# Patient Record
Sex: FEMALE | Race: WHITE | NOT HISPANIC OR LATINO | URBAN - METROPOLITAN AREA
[De-identification: names, ages, dates, MRNs, and addresses within clinical notes are randomized per-mention and may not be internally consistent; named-entity substitution may affect disease eponyms.]

---

## 2017-01-05 ENCOUNTER — OUTPATIENT (OUTPATIENT)
Dept: OUTPATIENT SERVICES | Facility: HOSPITAL | Age: 67
LOS: 1 days | End: 2017-01-05
Payer: COMMERCIAL

## 2017-01-05 DIAGNOSIS — Z01.818 ENCOUNTER FOR OTHER PREPROCEDURAL EXAMINATION: ICD-10-CM

## 2017-01-05 DIAGNOSIS — Z47.1 AFTERCARE FOLLOWING JOINT REPLACEMENT SURGERY: ICD-10-CM

## 2017-01-05 DIAGNOSIS — T81.89XA OTHER COMPLICATIONS OF PROCEDURES, NOT ELSEWHERE CLASSIFIED, INITIAL ENCOUNTER: ICD-10-CM

## 2017-01-05 DIAGNOSIS — Z01.812 ENCOUNTER FOR PREPROCEDURAL LABORATORY EXAMINATION: ICD-10-CM

## 2017-01-05 DIAGNOSIS — Z96.651 PRESENCE OF RIGHT ARTIFICIAL KNEE JOINT: ICD-10-CM

## 2017-01-05 PROCEDURE — 80048 BASIC METABOLIC PNL TOTAL CA: CPT

## 2017-01-05 PROCEDURE — 36415 COLL VENOUS BLD VENIPUNCTURE: CPT

## 2017-01-05 PROCEDURE — 85027 COMPLETE CBC AUTOMATED: CPT

## 2017-01-05 PROCEDURE — G0463: CPT

## 2017-01-06 LAB
ANION GAP SERPL CALC-SCNC: 14 MMOL/L — SIGNIFICANT CHANGE UP (ref 5–17)
BUN SERPL-MCNC: 17 MG/DL — SIGNIFICANT CHANGE UP (ref 7–23)
CALCIUM SERPL-MCNC: 10 MG/DL — SIGNIFICANT CHANGE UP (ref 8.4–10.5)
CHLORIDE SERPL-SCNC: 101 MMOL/L — SIGNIFICANT CHANGE UP (ref 96–108)
CO2 SERPL-SCNC: 26 MMOL/L — SIGNIFICANT CHANGE UP (ref 22–31)
CREAT SERPL-MCNC: 0.67 MG/DL — SIGNIFICANT CHANGE UP (ref 0.5–1.3)
GLUCOSE SERPL-MCNC: 94 MG/DL — SIGNIFICANT CHANGE UP (ref 70–99)
POTASSIUM SERPL-MCNC: 4.4 MMOL/L — SIGNIFICANT CHANGE UP (ref 3.5–5.3)
POTASSIUM SERPL-SCNC: 4.4 MMOL/L — SIGNIFICANT CHANGE UP (ref 3.5–5.3)
SODIUM SERPL-SCNC: 141 MMOL/L — SIGNIFICANT CHANGE UP (ref 135–145)

## 2017-03-02 ENCOUNTER — OUTPATIENT (OUTPATIENT)
Dept: OUTPATIENT SERVICES | Facility: HOSPITAL | Age: 67
LOS: 1 days | End: 2017-03-02
Payer: COMMERCIAL

## 2017-03-02 VITALS
WEIGHT: 220.02 LBS | DIASTOLIC BLOOD PRESSURE: 80 MMHG | HEIGHT: 63 IN | TEMPERATURE: 99 F | OXYGEN SATURATION: 97 % | RESPIRATION RATE: 16 BRPM | HEART RATE: 60 BPM | SYSTOLIC BLOOD PRESSURE: 156 MMHG

## 2017-03-02 DIAGNOSIS — G47.33 OBSTRUCTIVE SLEEP APNEA (ADULT) (PEDIATRIC): ICD-10-CM

## 2017-03-02 DIAGNOSIS — T81.89XA OTHER COMPLICATIONS OF PROCEDURES, NOT ELSEWHERE CLASSIFIED, INITIAL ENCOUNTER: ICD-10-CM

## 2017-03-02 DIAGNOSIS — Z96.651 PRESENCE OF RIGHT ARTIFICIAL KNEE JOINT: Chronic | ICD-10-CM

## 2017-03-02 DIAGNOSIS — Z01.818 ENCOUNTER FOR OTHER PREPROCEDURAL EXAMINATION: ICD-10-CM

## 2017-03-02 DIAGNOSIS — Z47.1 AFTERCARE FOLLOWING JOINT REPLACEMENT SURGERY: ICD-10-CM

## 2017-03-02 DIAGNOSIS — I10 ESSENTIAL (PRIMARY) HYPERTENSION: ICD-10-CM

## 2017-03-02 DIAGNOSIS — Z96.651 PRESENCE OF RIGHT ARTIFICIAL KNEE JOINT: ICD-10-CM

## 2017-03-02 LAB
ANION GAP SERPL CALC-SCNC: 14 MMOL/L — SIGNIFICANT CHANGE UP (ref 5–17)
BUN SERPL-MCNC: 14 MG/DL — SIGNIFICANT CHANGE UP (ref 7–23)
CALCIUM SERPL-MCNC: 10 MG/DL — SIGNIFICANT CHANGE UP (ref 8.4–10.5)
CHLORIDE SERPL-SCNC: 100 MMOL/L — SIGNIFICANT CHANGE UP (ref 96–108)
CO2 SERPL-SCNC: 24 MMOL/L — SIGNIFICANT CHANGE UP (ref 22–31)
CREAT SERPL-MCNC: 0.75 MG/DL — SIGNIFICANT CHANGE UP (ref 0.5–1.3)
GLUCOSE SERPL-MCNC: 108 MG/DL — HIGH (ref 70–99)
HCT VFR BLD CALC: 42.3 % — SIGNIFICANT CHANGE UP (ref 34.5–45)
HGB BLD-MCNC: 13.6 G/DL — SIGNIFICANT CHANGE UP (ref 11.5–15.5)
MCHC RBC-ENTMCNC: 27.4 PG — SIGNIFICANT CHANGE UP (ref 27–34)
MCHC RBC-ENTMCNC: 32.2 GM/DL — SIGNIFICANT CHANGE UP (ref 32–36)
MCV RBC AUTO: 85.1 FL — SIGNIFICANT CHANGE UP (ref 80–100)
PLATELET # BLD AUTO: 352 K/UL — SIGNIFICANT CHANGE UP (ref 150–400)
POTASSIUM SERPL-MCNC: 4.8 MMOL/L — SIGNIFICANT CHANGE UP (ref 3.5–5.3)
POTASSIUM SERPL-SCNC: 4.8 MMOL/L — SIGNIFICANT CHANGE UP (ref 3.5–5.3)
RBC # BLD: 4.97 M/UL — SIGNIFICANT CHANGE UP (ref 3.8–5.2)
RBC # FLD: 14.7 % — HIGH (ref 10.3–14.5)
SODIUM SERPL-SCNC: 138 MMOL/L — SIGNIFICANT CHANGE UP (ref 135–145)
WBC # BLD: 8.43 K/UL — SIGNIFICANT CHANGE UP (ref 3.8–10.5)
WBC # FLD AUTO: 8.43 K/UL — SIGNIFICANT CHANGE UP (ref 3.8–10.5)

## 2017-03-02 PROCEDURE — G0463: CPT

## 2017-03-02 PROCEDURE — 80048 BASIC METABOLIC PNL TOTAL CA: CPT

## 2017-03-02 PROCEDURE — 85027 COMPLETE CBC AUTOMATED: CPT

## 2017-03-02 RX ORDER — ACETAMINOPHEN 500 MG
975 TABLET ORAL ONCE
Qty: 0 | Refills: 0 | Status: COMPLETED | OUTPATIENT
Start: 2017-03-16 | End: 2017-03-16

## 2017-03-02 RX ORDER — SODIUM CHLORIDE 9 MG/ML
3 INJECTION INTRAMUSCULAR; INTRAVENOUS; SUBCUTANEOUS EVERY 8 HOURS
Qty: 0 | Refills: 0 | Status: DISCONTINUED | OUTPATIENT
Start: 2017-03-16 | End: 2017-03-31

## 2017-03-02 RX ORDER — LIDOCAINE HCL 20 MG/ML
0.2 VIAL (ML) INJECTION ONCE
Qty: 0 | Refills: 0 | Status: DISCONTINUED | OUTPATIENT
Start: 2017-03-16 | End: 2017-03-31

## 2017-03-02 RX ORDER — CELECOXIB 200 MG/1
200 CAPSULE ORAL ONCE
Qty: 0 | Refills: 0 | Status: COMPLETED | OUTPATIENT
Start: 2017-03-16 | End: 2017-03-16

## 2017-03-02 NOTE — H&P PST ADULT - HISTORY OF PRESENT ILLNESS
68 yo female. h/o obesity, HTN, fibromyalgia, osteoarthritis. S/P  right TKR 10/25/16. Presents right knee excision of suture Granuloma. 66 yo female. h/o Morbid obesity, HTN, fibromyalgia, osteoarthritis. S/P  right TKR 10/25/16. Pt states there is a suture still in her knee. Presents right knee excision of suture Granuloma.

## 2017-03-02 NOTE — H&P PST ADULT - PSH
Bladder Prolapse, Acquired  s/p repair  Status Post Breast Lumpectomy  right benign  Tubal Ligation Bladder Prolapse, Acquired  s/p repair  Status Post Breast Lumpectomy  right benign  Status post total right knee replacement  10/25/17  Tubal Ligation

## 2017-03-02 NOTE — H&P PST ADULT - PROBLEM SELECTOR PLAN 1
Right knee excision of suture granuloma  Check labs Right knee excision of suture granuloma  Check labs  Advised Dr Calderon OR booking person Melissa shelton is a Jehovah Witness and does not take blood products.

## 2017-03-02 NOTE — H&P PST ADULT - PMH
HTN (Hypertension)    Muscle Cramps at Night    HARIS (obstructive sleep apnea)  noncompliant with CPAP  Patient is Hinduism  refuse blood products  Pericarditis  around age 30's  Primary osteoarthritis of right knee HTN (Hypertension)    Morbid obesity due to excess calories    Muscle Cramps at Night    No blood products  Pt is Jehovah Witness  HARIS (obstructive sleep apnea)  noncompliant with CPAP  Patient is Druze  refuse blood products  Pericarditis  around age 30's  Primary osteoarthritis of right knee

## 2017-03-15 ENCOUNTER — RESULT REVIEW (OUTPATIENT)
Age: 67
End: 2017-03-15

## 2017-03-16 ENCOUNTER — OUTPATIENT (OUTPATIENT)
Dept: OUTPATIENT SERVICES | Facility: HOSPITAL | Age: 67
LOS: 1 days | End: 2017-03-16
Payer: COMMERCIAL

## 2017-03-16 ENCOUNTER — TRANSCRIPTION ENCOUNTER (OUTPATIENT)
Age: 67
End: 2017-03-16

## 2017-03-16 VITALS
HEART RATE: 70 BPM | DIASTOLIC BLOOD PRESSURE: 67 MMHG | OXYGEN SATURATION: 100 % | SYSTOLIC BLOOD PRESSURE: 144 MMHG | RESPIRATION RATE: 15 BRPM

## 2017-03-16 VITALS
RESPIRATION RATE: 18 BRPM | DIASTOLIC BLOOD PRESSURE: 83 MMHG | HEART RATE: 60 BPM | WEIGHT: 220.02 LBS | TEMPERATURE: 99 F | SYSTOLIC BLOOD PRESSURE: 156 MMHG | HEIGHT: 63 IN | OXYGEN SATURATION: 97 %

## 2017-03-16 DIAGNOSIS — T81.89XA OTHER COMPLICATIONS OF PROCEDURES, NOT ELSEWHERE CLASSIFIED, INITIAL ENCOUNTER: ICD-10-CM

## 2017-03-16 DIAGNOSIS — Z01.818 ENCOUNTER FOR OTHER PREPROCEDURAL EXAMINATION: ICD-10-CM

## 2017-03-16 DIAGNOSIS — Z96.651 PRESENCE OF RIGHT ARTIFICIAL KNEE JOINT: ICD-10-CM

## 2017-03-16 DIAGNOSIS — Z96.651 PRESENCE OF RIGHT ARTIFICIAL KNEE JOINT: Chronic | ICD-10-CM

## 2017-03-16 DIAGNOSIS — Z47.1 AFTERCARE FOLLOWING JOINT REPLACEMENT SURGERY: ICD-10-CM

## 2017-03-16 PROCEDURE — 88304 TISSUE EXAM BY PATHOLOGIST: CPT | Mod: 26

## 2017-03-16 PROCEDURE — 88304 TISSUE EXAM BY PATHOLOGIST: CPT

## 2017-03-16 PROCEDURE — 10120 INC&RMVL FB SUBQ TISS SMPL: CPT

## 2017-03-16 RX ORDER — SODIUM CHLORIDE 9 MG/ML
1000 INJECTION, SOLUTION INTRAVENOUS
Qty: 0 | Refills: 0 | Status: DISCONTINUED | OUTPATIENT
Start: 2017-03-16 | End: 2017-03-31

## 2017-03-16 RX ORDER — ONDANSETRON 8 MG/1
4 TABLET, FILM COATED ORAL ONCE
Qty: 0 | Refills: 0 | Status: DISCONTINUED | OUTPATIENT
Start: 2017-03-16 | End: 2017-03-31

## 2017-03-16 RX ORDER — MORPHINE SULFATE 50 MG/1
2 CAPSULE, EXTENDED RELEASE ORAL
Qty: 0 | Refills: 0 | Status: DISCONTINUED | OUTPATIENT
Start: 2017-03-16 | End: 2017-03-16

## 2017-03-16 RX ORDER — CELECOXIB 200 MG/1
200 CAPSULE ORAL ONCE
Qty: 0 | Refills: 0 | Status: DISCONTINUED | OUTPATIENT
Start: 2017-03-16 | End: 2017-03-31

## 2017-03-16 RX ORDER — OXYCODONE HYDROCHLORIDE 5 MG/1
5 TABLET ORAL ONCE
Qty: 0 | Refills: 0 | Status: DISCONTINUED | OUTPATIENT
Start: 2017-03-16 | End: 2017-03-16

## 2017-03-16 RX ORDER — ACETAMINOPHEN 500 MG
650 TABLET ORAL EVERY 6 HOURS
Qty: 0 | Refills: 0 | Status: DISCONTINUED | OUTPATIENT
Start: 2017-03-16 | End: 2017-03-31

## 2017-03-16 RX ORDER — VALSARTAN 80 MG/1
40 TABLET ORAL DAILY
Qty: 0 | Refills: 0 | Status: DISCONTINUED | OUTPATIENT
Start: 2017-03-16 | End: 2017-03-31

## 2017-03-16 RX ADMIN — CELECOXIB 200 MILLIGRAM(S): 200 CAPSULE ORAL at 12:58

## 2017-03-16 RX ADMIN — Medication 975 MILLIGRAM(S): at 12:58

## 2017-03-16 NOTE — ASU DISCHARGE PLAN (ADULT/PEDIATRIC). - NOTIFY
Fever greater than 101/Pain not relieved by Medications/Numbness, color, or temperature change to extremity/Swelling that continues

## 2017-03-16 NOTE — ASU DISCHARGE PLAN (ADULT/PEDIATRIC). - MEDICATION SUMMARY - MEDICATIONS TO TAKE
I will START or STAY ON the medications listed below when I get home from the hospital:    Prevacid  -- 1 tab(s) by mouth once a day, As Needed  -- Indication: For Acid reflux    Allegra  -- 1 tab(s) by mouth once a day, As Needed  -- Indication: For seasonal allergies    acetaminophen 325 mg oral tablet  -- 2 tab(s) by mouth every 6 hours, As needed, For Temp over 38.3 C (100.94 F)  -- Indication: For fever    oxyCODONE 5 mg oral tablet  -- 1 tab(s) by mouth every 3 hours, As needed, Moderate Pain (4 - 6)  -- Indication: For Pain    aspirin 325 mg oral delayed release tablet  -- 1 tab(s) by mouth 2 times a day x 6 WEEKS Total (blood thinner) then  RESUME Ecotrin 325mg DAILY  -- Indication: For dvt ppx    Diovan 40 mg oral tablet  -- 1 tab(s) by mouth once a day  -- Indication: For hypertension    Lyrica 100 mg oral capsule  -- 1 cap(s) by mouth once a day (at bedtime)  -- Indication: For neuropathy    gabapentin 300 mg oral capsule  -- 1 cap(s) by mouth once a day (at bedtime), As Needed  -- Indication: For neuropathy    simvastatin 20 mg oral tablet  -- 1 tab(s) by mouth 2 times a day  -- Indication: For hyperlipidemia    hydroCHLOROthiazide  --  by mouth once a day  -- Indication: For hypertension    docusate sodium 100 mg oral capsule  -- 1 cap(s) by mouth 3 times a day  -- Indication: For BM    senna oral tablet  -- 2 tab(s) by mouth once a day (at bedtime), As needed, Constipation  -- Indication: For BM    oxybutynin 5 mg/24 hours oral tablet, extended release  -- 2 tab(s) by mouth once a day (at bedtime)  -- Indication: For urinary spasms    Multiple Vitamins oral tablet  -- 1 tab(s) by mouth once a day  -- Indication: For supplement

## 2017-03-16 NOTE — ASU PATIENT PROFILE, ADULT - PMH
HTN (Hypertension)    Morbid obesity due to excess calories    Muscle Cramps at Night    No blood products  Pt is Jehovah Witness  HARIS (obstructive sleep apnea)  noncompliant with CPAP  Patient is Taoist  refuse blood products  Pericarditis  around age 30's  Primary osteoarthritis of right knee

## 2017-03-20 NOTE — ASU PREOP CHECKLIST - VERIFY SURGICAL SITE/SIDE WITH PATIENT
SUBJECTIVE:  Ms. Mark Diaz is seen back in followup for her rheumatoid arthritis.  She is actually doing well and came in today specifically to get an injection in her left shoulder, given how well the injection in her right shoulder and right trochanteric bursa worked.  She denies any injury or trauma just that the shoulder is keeping her awake at night.  Otherwise, she thinks her arthritis is doing well.      PHYSICAL EXAMINATION:     VITALS:  Blood pressure 162/99, pulse 73.     MUSCULOSKELETAL:  There is a pain in the subacromial bursal region, pain with external rotation of left shoulder.  No palpable synovitis or effusion.      IMPRESSION:   1.  Rheumatoid arthritis.   2.  Left subacromial bursitis.        RECOMMENDATIONS:     1.  Continue current medical regimen to inject the left shoulder today.   2.  Follow up in 6 months with labs every 3 months including today.      PROCEDURE NOTE:  After informed verbal consent was obtained, under sterile technique, choose ethyl chloride for anesthetic, injected 40 mg of Kenalog with 2 cc of lidocaine in the left shoulder without complications.  The patient tolerated the procedure well.         RUBEN CAMARGO MD             D: 2017 12:43   T: 2017 15:10   MT: [id number]#[id number]      Name:     MARK DIAZ   MRN:      [id number]        Account:      [id number]   :      1955           Visit Date:   2017      Document: [id number]    
done/right knee

## 2017-03-21 LAB — SURGICAL PATHOLOGY STUDY: SIGNIFICANT CHANGE UP

## 2018-02-02 ENCOUNTER — APPOINTMENT (OUTPATIENT)
Dept: BARIATRICS | Facility: CLINIC | Age: 68
End: 2018-02-02
Payer: MEDICARE

## 2018-02-02 VITALS
TEMPERATURE: 98.1 F | WEIGHT: 227 LBS | SYSTOLIC BLOOD PRESSURE: 166 MMHG | DIASTOLIC BLOOD PRESSURE: 78 MMHG | HEART RATE: 58 BPM | BODY MASS INDEX: 41.77 KG/M2 | HEIGHT: 62 IN

## 2018-02-02 DIAGNOSIS — Z86.59 PERSONAL HISTORY OF OTHER MENTAL AND BEHAVIORAL DISORDERS: ICD-10-CM

## 2018-02-02 DIAGNOSIS — Z86.39 PERSONAL HISTORY OF OTHER ENDOCRINE, NUTRITIONAL AND METABOLIC DISEASE: ICD-10-CM

## 2018-02-02 DIAGNOSIS — Z78.9 OTHER SPECIFIED HEALTH STATUS: ICD-10-CM

## 2018-02-02 DIAGNOSIS — Z80.3 FAMILY HISTORY OF MALIGNANT NEOPLASM OF BREAST: ICD-10-CM

## 2018-02-02 DIAGNOSIS — Z87.39 PERSONAL HISTORY OF OTHER DISEASES OF THE MUSCULOSKELETAL SYSTEM AND CONNECTIVE TISSUE: ICD-10-CM

## 2018-02-02 DIAGNOSIS — Z86.69 PERSONAL HISTORY OF OTHER DISEASES OF THE NERVOUS SYSTEM AND SENSE ORGANS: ICD-10-CM

## 2018-02-02 PROCEDURE — 99203 OFFICE O/P NEW LOW 30 MIN: CPT

## 2018-02-02 RX ORDER — ASPIRIN ENTERIC COATED TABLETS 81 MG 81 MG/1
81 TABLET, DELAYED RELEASE ORAL
Qty: 90 | Refills: 0 | Status: ACTIVE | COMMUNITY
Start: 2018-01-10

## 2018-02-13 ENCOUNTER — APPOINTMENT (OUTPATIENT)
Dept: PULMONOLOGY | Facility: CLINIC | Age: 68
End: 2018-02-13

## 2018-02-13 ENCOUNTER — LABORATORY RESULT (OUTPATIENT)
Age: 68
End: 2018-02-13

## 2018-02-13 ENCOUNTER — APPOINTMENT (OUTPATIENT)
Dept: PULMONOLOGY | Facility: CLINIC | Age: 68
End: 2018-02-13
Payer: MEDICARE

## 2018-02-13 VITALS
OXYGEN SATURATION: 93 % | DIASTOLIC BLOOD PRESSURE: 75 MMHG | WEIGHT: 232 LBS | TEMPERATURE: 98.3 F | BODY MASS INDEX: 42.69 KG/M2 | SYSTOLIC BLOOD PRESSURE: 139 MMHG | HEART RATE: 70 BPM | HEIGHT: 62 IN

## 2018-02-13 PROCEDURE — 94726 PLETHYSMOGRAPHY LUNG VOLUMES: CPT

## 2018-02-13 PROCEDURE — 94010 BREATHING CAPACITY TEST: CPT

## 2018-02-13 PROCEDURE — 94729 DIFFUSING CAPACITY: CPT

## 2018-02-13 PROCEDURE — 99203 OFFICE O/P NEW LOW 30 MIN: CPT | Mod: 25

## 2018-02-20 ENCOUNTER — APPOINTMENT (OUTPATIENT)
Dept: PULMONOLOGY | Facility: CLINIC | Age: 68
End: 2018-02-20
Payer: MEDICARE

## 2018-02-20 VITALS
SYSTOLIC BLOOD PRESSURE: 131 MMHG | HEART RATE: 59 BPM | OXYGEN SATURATION: 93 % | BODY MASS INDEX: 43.24 KG/M2 | DIASTOLIC BLOOD PRESSURE: 73 MMHG | WEIGHT: 232 LBS | HEIGHT: 61.5 IN

## 2018-02-20 PROCEDURE — 99203 OFFICE O/P NEW LOW 30 MIN: CPT

## 2018-02-20 RX ORDER — FEXOFENADINE HCL 60 MG
TABLET ORAL
Refills: 0 | Status: ACTIVE | COMMUNITY

## 2018-02-20 RX ORDER — HYDROCHLOROTHIAZIDE 12.5 MG/1
12.5 CAPSULE ORAL
Qty: 90 | Refills: 0 | Status: DISCONTINUED | COMMUNITY
Start: 2017-10-05 | End: 2018-02-20

## 2018-02-20 RX ORDER — SIMVASTATIN 20 MG/1
20 TABLET, FILM COATED ORAL
Qty: 90 | Refills: 0 | Status: DISCONTINUED | COMMUNITY
Start: 2017-10-11 | End: 2018-02-20

## 2018-02-20 RX ORDER — ROSUVASTATIN CALCIUM 10 MG/1
10 TABLET, FILM COATED ORAL
Qty: 90 | Refills: 0 | Status: ACTIVE | COMMUNITY
Start: 2017-12-02

## 2018-03-13 ENCOUNTER — APPOINTMENT (OUTPATIENT)
Dept: GASTROENTEROLOGY | Facility: CLINIC | Age: 68
End: 2018-03-13
Payer: MEDICARE

## 2018-03-13 VITALS
DIASTOLIC BLOOD PRESSURE: 86 MMHG | HEIGHT: 61.5 IN | SYSTOLIC BLOOD PRESSURE: 126 MMHG | TEMPERATURE: 97.5 F | WEIGHT: 224 LBS | HEART RATE: 64 BPM | BODY MASS INDEX: 41.75 KG/M2 | RESPIRATION RATE: 18 BRPM

## 2018-03-13 PROCEDURE — 99204 OFFICE O/P NEW MOD 45 MIN: CPT

## 2018-03-19 ENCOUNTER — APPOINTMENT (OUTPATIENT)
Dept: CARDIOLOGY | Facility: CLINIC | Age: 68
End: 2018-03-19
Payer: MEDICARE

## 2018-03-19 VITALS
SYSTOLIC BLOOD PRESSURE: 172 MMHG | BODY MASS INDEX: 41.75 KG/M2 | RESPIRATION RATE: 16 BRPM | HEART RATE: 77 BPM | HEIGHT: 61.5 IN | OXYGEN SATURATION: 96 % | WEIGHT: 224 LBS | DIASTOLIC BLOOD PRESSURE: 90 MMHG

## 2018-03-19 DIAGNOSIS — R06.02 SHORTNESS OF BREATH: ICD-10-CM

## 2018-03-19 PROCEDURE — 99204 OFFICE O/P NEW MOD 45 MIN: CPT

## 2018-03-19 PROCEDURE — 93000 ELECTROCARDIOGRAM COMPLETE: CPT

## 2018-03-19 RX ORDER — METFORMIN HYDROCHLORIDE 500 MG/1
500 TABLET, COATED ORAL
Qty: 180 | Refills: 3 | Status: DISCONTINUED | COMMUNITY
Start: 2018-02-20 | End: 2018-03-19

## 2018-04-09 ENCOUNTER — OUTPATIENT (OUTPATIENT)
Dept: OUTPATIENT SERVICES | Facility: HOSPITAL | Age: 68
LOS: 1 days | End: 2018-04-09
Payer: COMMERCIAL

## 2018-04-09 ENCOUNTER — APPOINTMENT (OUTPATIENT)
Dept: CARDIOLOGY | Facility: CLINIC | Age: 68
End: 2018-04-09

## 2018-04-09 DIAGNOSIS — Z00.8 ENCOUNTER FOR OTHER GENERAL EXAMINATION: ICD-10-CM

## 2018-04-09 DIAGNOSIS — Z96.651 PRESENCE OF RIGHT ARTIFICIAL KNEE JOINT: Chronic | ICD-10-CM

## 2018-04-09 PROCEDURE — 93306 TTE W/DOPPLER COMPLETE: CPT | Mod: 26

## 2018-04-09 PROCEDURE — 93306 TTE W/DOPPLER COMPLETE: CPT

## 2018-04-18 ENCOUNTER — OTHER (OUTPATIENT)
Age: 68
End: 2018-04-18

## 2018-04-23 ENCOUNTER — RESULT REVIEW (OUTPATIENT)
Age: 68
End: 2018-04-23

## 2018-04-23 ENCOUNTER — OUTPATIENT (OUTPATIENT)
Dept: OUTPATIENT SERVICES | Facility: HOSPITAL | Age: 68
LOS: 1 days | End: 2018-04-23
Payer: COMMERCIAL

## 2018-04-23 DIAGNOSIS — Z01.818 ENCOUNTER FOR OTHER PREPROCEDURAL EXAMINATION: ICD-10-CM

## 2018-04-23 DIAGNOSIS — Z96.651 PRESENCE OF RIGHT ARTIFICIAL KNEE JOINT: Chronic | ICD-10-CM

## 2018-04-23 PROCEDURE — 88312 SPECIAL STAINS GROUP 1: CPT | Mod: 26

## 2018-04-23 PROCEDURE — 88312 SPECIAL STAINS GROUP 1: CPT

## 2018-04-23 PROCEDURE — 88305 TISSUE EXAM BY PATHOLOGIST: CPT

## 2018-04-23 PROCEDURE — 43239 EGD BIOPSY SINGLE/MULTIPLE: CPT

## 2018-04-23 PROCEDURE — 88305 TISSUE EXAM BY PATHOLOGIST: CPT | Mod: 26

## 2018-04-24 ENCOUNTER — APPOINTMENT (OUTPATIENT)
Dept: CARDIOLOGY | Facility: CLINIC | Age: 68
End: 2018-04-24

## 2018-04-24 ENCOUNTER — OUTPATIENT (OUTPATIENT)
Dept: OUTPATIENT SERVICES | Facility: HOSPITAL | Age: 68
LOS: 1 days | End: 2018-04-24
Payer: COMMERCIAL

## 2018-04-24 DIAGNOSIS — Z00.8 ENCOUNTER FOR OTHER GENERAL EXAMINATION: ICD-10-CM

## 2018-04-24 DIAGNOSIS — Z96.651 PRESENCE OF RIGHT ARTIFICIAL KNEE JOINT: Chronic | ICD-10-CM

## 2018-04-24 DIAGNOSIS — R06.02 SHORTNESS OF BREATH: ICD-10-CM

## 2018-04-24 PROCEDURE — 93018 CV STRESS TEST I&R ONLY: CPT

## 2018-04-24 PROCEDURE — 78452 HT MUSCLE IMAGE SPECT MULT: CPT | Mod: 26

## 2018-04-24 PROCEDURE — 93017 CV STRESS TEST TRACING ONLY: CPT

## 2018-04-24 PROCEDURE — 78452 HT MUSCLE IMAGE SPECT MULT: CPT

## 2018-04-24 PROCEDURE — A9500: CPT

## 2018-04-24 PROCEDURE — 93016 CV STRESS TEST SUPVJ ONLY: CPT

## 2018-04-26 ENCOUNTER — APPOINTMENT (OUTPATIENT)
Dept: SLEEP CENTER | Facility: CLINIC | Age: 68
End: 2018-04-26
Payer: MEDICARE

## 2018-04-26 ENCOUNTER — OUTPATIENT (OUTPATIENT)
Dept: OUTPATIENT SERVICES | Facility: HOSPITAL | Age: 68
LOS: 1 days | End: 2018-04-26
Payer: COMMERCIAL

## 2018-04-26 DIAGNOSIS — Z96.651 PRESENCE OF RIGHT ARTIFICIAL KNEE JOINT: Chronic | ICD-10-CM

## 2018-04-26 PROCEDURE — 95811 POLYSOM 6/>YRS CPAP 4/> PARM: CPT | Mod: 26

## 2018-04-26 PROCEDURE — 95811 POLYSOM 6/>YRS CPAP 4/> PARM: CPT

## 2018-04-27 DIAGNOSIS — G47.33 OBSTRUCTIVE SLEEP APNEA (ADULT) (PEDIATRIC): ICD-10-CM

## 2018-05-03 ENCOUNTER — APPOINTMENT (OUTPATIENT)
Dept: PULMONOLOGY | Facility: CLINIC | Age: 68
End: 2018-05-03
Payer: MEDICARE

## 2018-05-03 VITALS
OXYGEN SATURATION: 98 % | SYSTOLIC BLOOD PRESSURE: 133 MMHG | HEART RATE: 76 BPM | HEIGHT: 61.5 IN | WEIGHT: 229 LBS | BODY MASS INDEX: 42.68 KG/M2 | DIASTOLIC BLOOD PRESSURE: 57 MMHG

## 2018-05-03 PROCEDURE — 99213 OFFICE O/P EST LOW 20 MIN: CPT

## 2018-05-23 ENCOUNTER — OTHER (OUTPATIENT)
Age: 68
End: 2018-05-23

## 2018-05-29 ENCOUNTER — APPOINTMENT (OUTPATIENT)
Dept: PULMONOLOGY | Facility: CLINIC | Age: 68
End: 2018-05-29
Payer: MEDICARE

## 2018-05-29 PROCEDURE — 95806 SLEEP STUDY UNATT&RESP EFFT: CPT

## 2018-06-18 ENCOUNTER — APPOINTMENT (OUTPATIENT)
Dept: CARDIOLOGY | Facility: CLINIC | Age: 68
End: 2018-06-18

## 2018-07-02 ENCOUNTER — APPOINTMENT (OUTPATIENT)
Dept: CARDIOLOGY | Facility: CLINIC | Age: 68
End: 2018-07-02

## 2018-07-02 ENCOUNTER — OTHER (OUTPATIENT)
Age: 68
End: 2018-07-02

## 2018-08-07 ENCOUNTER — APPOINTMENT (OUTPATIENT)
Dept: PULMONOLOGY | Facility: CLINIC | Age: 68
End: 2018-08-07
Payer: MEDICARE

## 2018-08-07 VITALS
SYSTOLIC BLOOD PRESSURE: 145 MMHG | WEIGHT: 225 LBS | HEART RATE: 64 BPM | HEIGHT: 61.5 IN | BODY MASS INDEX: 41.94 KG/M2 | OXYGEN SATURATION: 95 % | DIASTOLIC BLOOD PRESSURE: 74 MMHG

## 2018-08-07 PROBLEM — E66.01 MORBID (SEVERE) OBESITY DUE TO EXCESS CALORIES: Chronic | Status: ACTIVE | Noted: 2017-03-02

## 2018-08-07 PROCEDURE — 99213 OFFICE O/P EST LOW 20 MIN: CPT

## 2018-08-17 ENCOUNTER — NON-APPOINTMENT (OUTPATIENT)
Age: 68
End: 2018-08-17

## 2018-08-17 ENCOUNTER — APPOINTMENT (OUTPATIENT)
Dept: CARDIOLOGY | Facility: CLINIC | Age: 68
End: 2018-08-17
Payer: MEDICARE

## 2018-08-17 VITALS
HEART RATE: 59 BPM | WEIGHT: 225 LBS | SYSTOLIC BLOOD PRESSURE: 152 MMHG | HEIGHT: 65 IN | DIASTOLIC BLOOD PRESSURE: 78 MMHG | BODY MASS INDEX: 37.49 KG/M2 | OXYGEN SATURATION: 94 %

## 2018-08-17 DIAGNOSIS — Z01.818 ENCOUNTER FOR OTHER PREPROCEDURAL EXAMINATION: ICD-10-CM

## 2018-08-17 PROCEDURE — 93000 ELECTROCARDIOGRAM COMPLETE: CPT

## 2018-08-17 PROCEDURE — 99214 OFFICE O/P EST MOD 30 MIN: CPT

## 2018-08-17 RX ORDER — VALSARTAN AND HYDROCHLOROTHIAZIDE 160; 25 MG/1; MG/1
160-25 TABLET, FILM COATED ORAL
Qty: 90 | Refills: 0 | Status: DISCONTINUED | COMMUNITY
Start: 2018-01-10 | End: 2018-08-17

## 2018-09-04 ENCOUNTER — APPOINTMENT (OUTPATIENT)
Dept: PULMONOLOGY | Facility: CLINIC | Age: 68
End: 2018-09-04
Payer: MEDICARE

## 2018-09-04 VITALS
SYSTOLIC BLOOD PRESSURE: 111 MMHG | HEART RATE: 71 BPM | BODY MASS INDEX: 37.15 KG/M2 | DIASTOLIC BLOOD PRESSURE: 62 MMHG | WEIGHT: 223 LBS | HEIGHT: 65 IN | OXYGEN SATURATION: 97 %

## 2018-09-04 PROCEDURE — 99213 OFFICE O/P EST LOW 20 MIN: CPT

## 2018-11-15 ENCOUNTER — APPOINTMENT (OUTPATIENT)
Dept: PULMONOLOGY | Facility: CLINIC | Age: 68
End: 2018-11-15
Payer: MEDICARE

## 2018-11-15 VITALS
OXYGEN SATURATION: 98 % | DIASTOLIC BLOOD PRESSURE: 72 MMHG | WEIGHT: 224 LBS | HEIGHT: 65 IN | BODY MASS INDEX: 37.32 KG/M2 | SYSTOLIC BLOOD PRESSURE: 116 MMHG | HEART RATE: 66 BPM

## 2018-11-15 PROCEDURE — 99213 OFFICE O/P EST LOW 20 MIN: CPT

## 2018-11-21 ENCOUNTER — APPOINTMENT (OUTPATIENT)
Dept: PULMONOLOGY | Facility: CLINIC | Age: 68
End: 2018-11-21

## 2018-12-18 ENCOUNTER — APPOINTMENT (OUTPATIENT)
Dept: CARDIOLOGY | Facility: CLINIC | Age: 68
End: 2018-12-18

## 2018-12-18 ENCOUNTER — APPOINTMENT (OUTPATIENT)
Dept: PULMONOLOGY | Facility: CLINIC | Age: 68
End: 2018-12-18
Payer: MEDICARE

## 2018-12-18 VITALS
DIASTOLIC BLOOD PRESSURE: 75 MMHG | OXYGEN SATURATION: 97 % | HEART RATE: 62 BPM | WEIGHT: 224 LBS | HEIGHT: 65 IN | SYSTOLIC BLOOD PRESSURE: 145 MMHG | BODY MASS INDEX: 37.32 KG/M2

## 2018-12-18 PROCEDURE — 99213 OFFICE O/P EST LOW 20 MIN: CPT

## 2019-01-08 DIAGNOSIS — Z01.818 ENCOUNTER FOR OTHER PREPROCEDURAL EXAMINATION: ICD-10-CM

## 2019-01-28 ENCOUNTER — APPOINTMENT (OUTPATIENT)
Dept: SURGERY | Facility: CLINIC | Age: 69
End: 2019-01-28
Payer: MEDICARE

## 2019-01-28 VITALS
HEART RATE: 71 BPM | WEIGHT: 223 LBS | OXYGEN SATURATION: 98 % | HEIGHT: 63 IN | DIASTOLIC BLOOD PRESSURE: 80 MMHG | RESPIRATION RATE: 15 BRPM | BODY MASS INDEX: 39.51 KG/M2 | SYSTOLIC BLOOD PRESSURE: 139 MMHG

## 2019-01-28 DIAGNOSIS — M25.569 PAIN IN UNSPECIFIED KNEE: ICD-10-CM

## 2019-01-28 DIAGNOSIS — E66.01 MORBID (SEVERE) OBESITY DUE TO EXCESS CALORIES: ICD-10-CM

## 2019-01-28 DIAGNOSIS — J45.909 UNSPECIFIED ASTHMA, UNCOMPLICATED: ICD-10-CM

## 2019-01-28 DIAGNOSIS — I83.93 ASYMPTOMATIC VARICOSE VEINS OF BILATERAL LOWER EXTREMITIES: ICD-10-CM

## 2019-01-28 DIAGNOSIS — K21.9 GASTRO-ESOPHAGEAL REFLUX DISEASE W/OUT ESOPHAGITIS: ICD-10-CM

## 2019-01-28 PROCEDURE — 99205 OFFICE O/P NEW HI 60 MIN: CPT

## 2019-01-28 RX ORDER — ALBUTEROL SULFATE 90 UG/1
AEROSOL, METERED RESPIRATORY (INHALATION)
Refills: 0 | Status: DISCONTINUED | COMMUNITY
End: 2019-01-28

## 2019-01-28 RX ORDER — BUPROPION HYDROCHLORIDE 300 MG/1
300 TABLET, EXTENDED RELEASE ORAL
Qty: 90 | Refills: 0 | Status: DISCONTINUED | COMMUNITY
Start: 2018-01-17 | End: 2019-01-28

## 2019-01-28 RX ORDER — PREGABALIN 100 MG/1
100 CAPSULE ORAL
Qty: 60 | Refills: 0 | Status: DISCONTINUED | COMMUNITY
Start: 2017-08-20 | End: 2019-01-28

## 2019-01-28 NOTE — REVIEW OF SYSTEMS
[Lower Ext Edema] : lower extremity edema [Reflux/Heartburn] : reflux/heartburn [Joint Pain] : joint pain [Negative] : Allergic/Immunologic [Patient Intake Form Reviewed] : Patient intake form was reviewed

## 2019-01-28 NOTE — CONSULT LETTER
[Dear  ___] : Dear  [unfilled], [Consult Letter:] : I had the pleasure of evaluating your patient, [unfilled]. [Please see my note below.] : Please see my note below. [Consult Closing:] : Thank you very much for allowing me to participate in the care of this patient.  If you have any questions, please do not hesitate to contact me. [Sincerely,] : Sincerely, [FreeTextEntry3] : August Kiser MD, FACS, FASMBS\par Associate , Department of Surgery\par Chief, Division of General Surgery\par Director of Metabolic and Bariatric Surgery, and Robotic Minimally Invasive Surgery

## 2019-01-28 NOTE — REASON FOR VISIT
[Initial Consult] : an initial consult for [Morbid Obesity (BMI<40)] : morbid obesity (bmi<40) [Spouse] : spouse

## 2019-01-28 NOTE — PHYSICAL EXAM
[Obese, well nourished, in no acute distress] : obese, well nourished, in no acute distress [Normal] : affect appropriate [de-identified] : Normoactive bowel sounds, no hepatosplenomegaly, no masses, non-tender.

## 2019-01-28 NOTE — PLAN
[FreeTextEntry1] : Weight loss surgery.  The risks, benefits and alternatives, including laparoscopic gastric bypass, laparoscopic vertical sleeve gastrectomy and laparoscopic gastric banding, were discussed at length and all of her questions were answered.  The patient appears to understand and wishes to proceed.\par \par The patient was given the following instructions:\par \par 1.	She needs a complete medical evaluation including echocardiogram, stress test, pulmonary function test and evaluation for sleep apnea.\par 2.	She needs an upper endoscopy.\par \par 3.	She needs dietary and psychological evaluations.\par \par 4.	She must attend a preoperative support group meeting.\par \par 5.	She must undergo a right upper quadrant ultrasound.\par \par The patient clearly understood that surgery would only be scheduled if there are no medical or psychiatric contraindications and a second office visit is required.  \par \par

## 2019-01-28 NOTE — ASSESSMENT
[FreeTextEntry1] : The patient is a morbidly obese woman, (BMI= 39.5), with significant weight related comorbidity including: Obstructive sleep apnea, shortness of breath with exertion, weightbearing joint pain, low back pain, hypertension and hypercholesterolemia; unable to lose weight and improve her co-morbid conditions with medical management including diet, exercise and weight loss medication.

## 2019-01-28 NOTE — HISTORY OF PRESENT ILLNESS
[de-identified] : Sumi is a 67 y/o female here for a weight loss consultation visit.  [de-identified] : The patient is a 68 year-old morbidly obese woman, 5 feet 3 inches 223 pounds (BMI= 39.5).  The patient presents with her  requesting weight loss surgery.  She was originally seen by me 6 years prior but lost her insurance. She most recently was seen by Dr. Mayorga and was ready to schedule surgery however Dr. Mayorga we located. She has been more than 100 lb. overweight for the past 10 years and is currently 20 pounds less than her greatest weight.  \par \par The patient has tried numerous weight loss programs including [place] [employer], Slim fast, [place], and self-directed and physician supervised diets. Patient has taken weight loss medication (Fen-Phen, redux) but had to stop due to known side effects. Patient has lost up to 50 pounds on more than one occasion.\par \par \par The patient denies diabetes, but complains of shortness of breath with exertion, weight bearing joint pain, and low back pain.  She denies kidney, urinary tract disease, headaches, dizziness, seizure or neurological disorder other than "hand" issue.  She denies untreated thyroid, adrenal, pituitary disease, depression or psychiatric disorder.  The patient denies gallstones, heartburn, ulcer or liver disease.  She has high blood pressure, and high cholesterol, but denies heart attack or stroke.  She denies anemia, bleeding disorder, thrombosis, clotting disorder or easy bruisability.  She denies peripheral edema and complains of snoring and daytime somnolence. She denies a history of irregular menses, hirsutism, acne, infertility or stress urinary incontinence.

## 2019-03-19 ENCOUNTER — APPOINTMENT (OUTPATIENT)
Dept: PULMONOLOGY | Facility: CLINIC | Age: 69
End: 2019-03-19
Payer: MEDICARE

## 2019-03-19 VITALS
HEIGHT: 63 IN | OXYGEN SATURATION: 98 % | SYSTOLIC BLOOD PRESSURE: 162 MMHG | HEART RATE: 58 BPM | DIASTOLIC BLOOD PRESSURE: 73 MMHG | BODY MASS INDEX: 39.34 KG/M2 | WEIGHT: 222 LBS

## 2019-03-19 DIAGNOSIS — Z01.811 ENCOUNTER FOR PREPROCEDURAL RESPIRATORY EXAMINATION: ICD-10-CM

## 2019-03-19 PROCEDURE — 99214 OFFICE O/P EST MOD 30 MIN: CPT | Mod: 25

## 2019-03-19 PROCEDURE — 94729 DIFFUSING CAPACITY: CPT | Mod: 26

## 2019-03-19 PROCEDURE — 94010 BREATHING CAPACITY TEST: CPT | Mod: 26

## 2019-03-19 PROCEDURE — 94727 GAS DIL/WSHOT DETER LNG VOL: CPT | Mod: 26

## 2019-03-19 NOTE — PROCEDURE
[FreeTextEntry1] : The pulmonary function testing done and was normal spirometry and lung volumes. The DLCO is moderately reduced.\par The CPAP download data reveals that the patient has used CPAP 97% of the time. Her AHI is 2.3.

## 2019-03-19 NOTE — PHYSICAL EXAM
[General Appearance - Well Developed] : well developed [Well Groomed] : well groomed [Normal Appearance] : normal appearance [General Appearance - Well Nourished] : well nourished [No Deformities] : no deformities [General Appearance - In No Acute Distress] : no acute distress [Normal Conjunctiva] : the conjunctiva exhibited no abnormalities [Normal Oropharynx] : normal oropharynx [Eyelids - No Xanthelasma] : the eyelids demonstrated no xanthelasmas [IV] : IV [Neck Appearance] : the appearance of the neck was normal [Neck Cervical Mass (___cm)] : no neck mass was observed [Jugular Venous Distention Increased] : there was no jugular-venous distention [Thyroid Diffuse Enlargement] : the thyroid was not enlarged [Thyroid Nodule] : there were no palpable thyroid nodules [Heart Sounds] : normal S1 and S2 [Heart Rate And Rhythm] : heart rate and rhythm were normal [Murmurs] : no murmurs present [Exaggerated Use Of Accessory Muscles For Inspiration] : no accessory muscle use [Respiration, Rhythm And Depth] : normal respiratory rhythm and effort [Auscultation Breath Sounds / Voice Sounds] : lungs were clear to auscultation bilaterally [Abdomen Soft] : soft [Abdomen Tenderness] : non-tender [Abdomen Mass (___ Cm)] : no abdominal mass palpated [Gait - Sufficient For Exercise Testing] : the gait was sufficient for exercise testing [Abnormal Walk] : normal gait [Nail Clubbing] : no clubbing of the fingernails [Cyanosis, Localized] : no localized cyanosis [Skin Color & Pigmentation] : normal skin color and pigmentation [Petechial Hemorrhages (___cm)] : no petechial hemorrhages [No Venous Stasis] : no venous stasis [] : no rash [Skin Lesions] : no skin lesions [No Skin Ulcers] : no skin ulcer [Deep Tendon Reflexes (DTR)] : deep tendon reflexes were 2+ and symmetric [No Xanthoma] : no  xanthoma was observed [No Focal Deficits] : no focal deficits [Sensation] : the sensory exam was normal to light touch and pinprick [Affect] : the affect was normal [Impaired Insight] : insight and judgment were intact [Oriented To Time, Place, And Person] : oriented to person, place, and time

## 2019-03-19 NOTE — DISCUSSION/SUMMARY
[FreeTextEntry1] : Based on history, examination and pulmonary function testing the patient is cleared for the proposed bariatric surgery from pulmonary point of view.\par The patient has obstructive sleep apnea and is on CPAP of 12 cm of water. It is recommended that the patient use the CPAP regularly including during the  perioperative period.

## 2019-03-19 NOTE — HISTORY OF PRESENT ILLNESS
[FreeTextEntry1] : The patient is a 69-year-old lady with history of sleep apnea here for evaluation prior to bariatric surgery.\par The patient is on CPAP of 12 cm of water.\par She uses CPAP regularly. She tolerates it well.\par The patient has no dyspnea on mild to moderate exertion. She has no cough. She is stable at present from the pulmonary point of view.

## 2019-03-19 NOTE — REASON FOR VISIT
[Preoperative Evaluation] : an preoperative evaluation [FreeTextEntry1] : Prior to bariatric surgery

## 2019-09-17 ENCOUNTER — APPOINTMENT (OUTPATIENT)
Dept: PULMONOLOGY | Facility: CLINIC | Age: 69
End: 2019-09-17

## 2020-10-12 ENCOUNTER — APPOINTMENT (OUTPATIENT)
Dept: NEUROLOGY | Facility: CLINIC | Age: 70
End: 2020-10-12

## 2020-11-04 ENCOUNTER — APPOINTMENT (OUTPATIENT)
Dept: NEUROLOGY | Facility: CLINIC | Age: 70
End: 2020-11-04
Payer: MEDICARE

## 2020-11-04 VITALS
BODY MASS INDEX: 38.45 KG/M2 | SYSTOLIC BLOOD PRESSURE: 131 MMHG | HEIGHT: 63 IN | WEIGHT: 217 LBS | TEMPERATURE: 98 F | OXYGEN SATURATION: 98 % | HEART RATE: 84 BPM | DIASTOLIC BLOOD PRESSURE: 34 MMHG

## 2020-11-04 DIAGNOSIS — R26.89 OTHER ABNORMALITIES OF GAIT AND MOBILITY: ICD-10-CM

## 2020-11-04 PROCEDURE — 99205 OFFICE O/P NEW HI 60 MIN: CPT

## 2020-11-04 PROCEDURE — 99072 ADDL SUPL MATRL&STAF TM PHE: CPT

## 2020-11-04 RX ORDER — PAROXETINE HYDROCHLORIDE 20 MG/1
20 TABLET, FILM COATED ORAL
Qty: 90 | Refills: 0 | Status: DISCONTINUED | COMMUNITY
Start: 2017-10-11 | End: 2020-11-04

## 2020-11-04 RX ORDER — HYDROCHLOROTHIAZIDE 12.5 MG/1
12.5 TABLET ORAL
Refills: 0 | Status: DISCONTINUED | COMMUNITY
End: 2020-11-04

## 2020-11-04 RX ORDER — FLUTICASONE PROPIONATE 50 UG/1
50 SPRAY, METERED NASAL
Qty: 16 | Refills: 0 | Status: DISCONTINUED | COMMUNITY
Start: 2018-01-10 | End: 2020-11-04

## 2020-11-04 NOTE — PHYSICAL EXAM
[General Appearance - Alert] : alert [General Appearance - In No Acute Distress] : in no acute distress [Person] : oriented to person [Place] : oriented to place [Time] : oriented to time [Registration Intact] : recent registration memory intact [Concentration Intact] : normal concentrating ability [Visual Intact] : visual attention was ~T not ~L decreased [Naming Objects] : no difficulty naming common objects [Repeating Phrases] : no difficulty repeating a phrase [Fluency] : fluency intact [Comprehension] : comprehension intact [Vocabulary] : adequate range of vocabulary [Cranial Nerves Optic (II)] : visual acuity intact bilaterally,  visual fields full to confrontation, pupils equal round and reactive to light [Cranial Nerves Oculomotor (III)] : extraocular motion intact [Cranial Nerves Trigeminal (V)] : facial sensation intact symmetrically [Cranial Nerves Facial (VII)] : face symmetrical [Cranial Nerves Vestibulocochlear (VIII)] : hearing was intact bilaterally [Cranial Nerves Glossopharyngeal (IX)] : tongue and palate midline [Cranial Nerves Accessory (XI - Cranial And Spinal)] : head turning and shoulder shrug symmetric [Cranial Nerves Hypoglossal (XII)] : there was no tongue deviation with protrusion [Motor Tone] : muscle tone was normal in all four extremities [Motor Strength] : muscle strength was normal in all four extremities [Involuntary Movements] : no involuntary movements were seen [Sensation Tactile Decrease] : light touch was intact [Romberg's Sign] : a positive Romberg's sign was present [Abnormal Walk] : normal gait [Balance] : balance was intact [Limited Balance] : the patient's balance was impaired [1+] : Ankle jerk left 1+ [Full Pulse] : the pedal pulses are present [Coordination - Dysmetria Impaired Finger-to-Nose Bilateral] : not present [Coordination - Dysmetria Impaired Heel-to-Shin Bilateral] : not present [Plantar Reflex Right Only] : normal on the right [Plantar Reflex Left Only] : normal on the left [FreeTextEntry5] : fundi not visualized

## 2020-11-04 NOTE — CONSULT LETTER
[Dear  ___] : Dear  [unfilled], [Consult Letter:] : I had the pleasure of evaluating your patient, [unfilled]. [Please see my note below.] : Please see my note below. [Consult Closing:] : Thank you very much for allowing me to participate in the care of this patient.  If you have any questions, please do not hesitate to contact me. [Sincerely,] : Sincerely, [FreeTextEntry3] : Gila Hodge MD, MPH\par

## 2020-11-04 NOTE — HISTORY OF PRESENT ILLNESS
[FreeTextEntry1] : The patient is here for balance problems since spring 2020.  She feels unsteady and feels like she falls to the left side.  She denies vertigo, double or loss of vision, slurred speech or difficulty with swallowing.  She has blurry vision and feels dizzy at times.  She has had a few falls.  Additionally, the patient feels numbness and tingling in bl hands and legs, unclear for how long, without any weakness.\par \par She also has chronic headaches, described as throbbing pain with at times nausea and vomiting, no notable photo and phonophobia.  The headaches are not frequent at this time per the patient.

## 2020-11-04 NOTE — ASSESSMENT
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

## 2020-11-04 NOTE — REVIEW OF SYSTEMS
[As Noted in HPI] : as noted in HPI [Eyesight Problems] : eyesight problems [Joint Pain] : joint pain [Fever] : no fever [Anxiety] : no anxiety [Loss Of Hearing] : no hearing loss [Chest Pain] : no chest pain [Shortness Of Breath] : no shortness of breath [Vomiting] : no vomiting [Pelvic Pain] : no pelvic pain [Itching] : no itching [Muscle Weakness] : no muscle weakness [Easy Bleeding] : no tendency for easy bleeding

## 2020-11-05 LAB
ESTIMATED AVERAGE GLUCOSE: 151 MG/DL
FOLATE SERPL-MCNC: 17.6 NG/ML
HBA1C MFR BLD HPLC: 6.9 %
T3 SERPL-MCNC: 130 NG/DL
T4 SERPL-MCNC: 8.4 UG/DL
TSH SERPL-ACNC: 1.61 UIU/ML
VIT B12 SERPL-MCNC: 878 PG/ML

## 2020-11-06 LAB
ALBUMIN MFR SERPL ELPH: 52.1 %
ALBUMIN SERPL-MCNC: 3.7 G/DL
ALBUMIN/GLOB SERPL: 1.1 RATIO
ALPHA1 GLOB MFR SERPL ELPH: 4.4 %
ALPHA1 GLOB SERPL ELPH-MCNC: 0.3 G/DL
ALPHA2 GLOB MFR SERPL ELPH: 11.5 %
ALPHA2 GLOB SERPL ELPH-MCNC: 0.8 G/DL
B-GLOBULIN MFR SERPL ELPH: 15.7 %
B-GLOBULIN SERPL ELPH-MCNC: 1.1 G/DL
GAMMA GLOB FLD ELPH-MCNC: 1.2 G/DL
GAMMA GLOB MFR SERPL ELPH: 16.3 %
INTERPRETATION SERPL IEP-IMP: NORMAL
M PROTEIN SPEC IFE-MCNC: NORMAL
PROT SERPL-MCNC: 7.1 G/DL
PROT SERPL-MCNC: 7.1 G/DL

## 2020-11-11 LAB — METHYLMALONATE SERPL-SCNC: 175 NMOL/L

## 2020-12-10 ENCOUNTER — APPOINTMENT (OUTPATIENT)
Dept: NEUROLOGY | Facility: CLINIC | Age: 70
End: 2020-12-10

## 2021-05-10 ENCOUNTER — APPOINTMENT (OUTPATIENT)
Dept: NEUROLOGY | Facility: CLINIC | Age: 71
End: 2021-05-10
Payer: MEDICARE

## 2021-05-10 VITALS
HEIGHT: 63 IN | DIASTOLIC BLOOD PRESSURE: 81 MMHG | HEART RATE: 80 BPM | WEIGHT: 206 LBS | SYSTOLIC BLOOD PRESSURE: 146 MMHG | BODY MASS INDEX: 36.5 KG/M2

## 2021-05-10 DIAGNOSIS — M54.9 DORSALGIA, UNSPECIFIED: ICD-10-CM

## 2021-05-10 PROCEDURE — 99214 OFFICE O/P EST MOD 30 MIN: CPT

## 2021-05-10 NOTE — DATA REVIEWED
[de-identified] : As per HPI [No Feeding Issues] : no feeding issues at this time [de-identified] : We had the pleasure of evaluating Meseret in the Division of Hematology/Oncology at [facility] for evaluation of heavy menses and secondary anemia.  Meseret is a 15 year old girl with past medical history of heart murmur (evaluated by Dr. Crump of cardiology in 2014 and deemed functional murmur), autism and aggressive behavior who presents to us as a hospital follow up after being admitted on 10/17/19 for anemia.  She has recently had dysfunctional uterine bleeding x 3-4 months where mother would report daily bleeding of up to 6 pad changes a day.  Additionally, she had begun having decreased PO intake and daily vomiting which mom states is new behavior and unlike Meseret.  She does not attribute this vomiting to mealtimes and does not think it is caused by food.  She was first taken to the ER at [facility] in August 2019 for heavy menses and external pelvic ultrasound showed no ovarian cysts or torsion. She began seeing a gynecologist who prescribed her Vymynza (0.04/0.035 ethyl estradiol). Mom states the first cycle of OCP went well and Meseret stopped bleeding; however, prison through second pack she began bleeding again and this bleeding has continued now for 1 month.  Per mother, the day of admission (10/16/19), Meseret went upstairs at home and parents hear a thud.  She was found by parents sitting on the floor, reportedly "out of it" per mother.  Fall unwitnessed but mother does not think she lost consciousness.  She was taken to [facility] ED where hemoglobin was noted to be 7.1.  Iron studies showed serum iron of 16 and ferritin of 7.60 on 10/16/19.  She was given 1 liter NS bolus and venofer but remained tachycardic and hematology was consulted--1 unit of PRBC given. She was discharged home with a hemoglobin of 8.2.  She presents to us today for subsequent referral of her dysfunctional uterine bleeding and secondary iron deficiency. \par \par Meseret was born at 34 weeks in [facility] via c section due to maternal hypotension.  Mother states Meseret had no bleeding and no complications after delivery.  She denies history of easy bruising in Meseret or bleeding.  Denies epistaxis and denies bleeding to gums with teeth brushing. She has had no surgeries or dental extractions.  \par \par Family history notable for mom's sister and father having history of nosebleeds but no other bleeding history in family.  Mom denies heavy menses. Meseret has a 19 year old sister with no nosebleed history and no heavy menses that mother is aware of. \par \par Meseret is verbal, ambulatory, and able to answer questions.  Her medications include:\par Abilify 10 mg (once daily in a.m.)\par Prozac 40 mg (once daily in a.m.)\par Risperidone 1 mg (once daily p.m.)\par Clonidine 0.1 mg (2 tablets daily for total 0.2 mg in p.m.). \par Began OCP Vymynza (0.04/0.035 ethyl estradiol) in August 2019.  [de-identified] : Meseret presents for follow up of her iron deficient anemia.  She has recently completed her venofer x 4 doses, last given on 11/7/19.  She reports feeling "much better" with "more energy".  She has seen Dr. Luz of gynecology and has now been on OCP nortrel for 20 days as per mother.  Mom states that within the first week of OCP's she stopped bleeding.  \par \par Her hemoglobin today is 10.7

## 2021-05-10 NOTE — DISCUSSION/SUMMARY
[FreeTextEntry1] : 71-year-old woman who is here for initial visit with me for back pain with radiation down both legs in the distribution of bilateral S1/s2.  Will obtain MRI of the lumbar spine.  We will also start prednisone taper and have her follow-up with Dr. Hodge and neurosurgeon as per her previous visit with Dr. Hodge.\par \par I spent the time noted on the day of this patient encounter preparing for, providing and documenting the above E/M service and counseling and educate patient on differential, workup, disease course, and treatment/management. Education was provided to the patient during this encounter. All questions and concerns were answered and addressed in detail. The patient verbalized understanding and agreed to plan. Patient was advised to continue to monitor for neurologic symptoms and to notify my office or go to the nearest emergency room if there are any changes. Any orders/referrals and communications were provided as well. \par Side effects of the above medications were discussed in detail including but not limited to applicable black box warning and teratogenicity as appropriate. \par Patient was advised to bring previous records to my office. \par \par \par

## 2021-05-10 NOTE — HISTORY OF PRESENT ILLNESS
[FreeTextEntry1] : 71-year-old woman who had seen Dr. Hodge in the past for balance issues.  Patient had neuropathy blood work which was unremarkable.  Patient was supposed to go for EMG testing however patient had to cancel due to Covid exposure.  Patient did not get a chance to see neurosurgery for surgical evaluation of a Chiari I malformation on MRI of the brain.\par \par Today patient came with a different symptom of back pain for the past couple of months.  There is no story of trauma or heavy lifting that may have triggered this.  Patient states the pain starts in the back and goes down bilateral buttocks to the back of the knees.  She has no changes in her urinary issues.  No fecal incontinence.  No perineal anesthesia.

## 2021-05-10 NOTE — PHYSICAL EXAM
[General Appearance - Alert] : alert [Oriented To Time, Place, And Person] : oriented to person, place, and time [Person] : oriented to person [Place] : oriented to place [Time] : oriented to time [Short Term Intact] : short term memory intact [Fluency] : fluency intact [Current Events] : adequate knowledge of current events [Cranial Nerves Optic (II)] : visual acuity intact bilaterally,  visual fields full to confrontation, pupils equal round and reactive to light [Cranial Nerves Oculomotor (III)] : extraocular motion intact [Cranial Nerves Vestibulocochlear (VIII)] : hearing was intact bilaterally [Cranial Nerves Accessory (XI - Cranial And Spinal)] : head turning and shoulder shrug symmetric [Motor Tone] : muscle tone was normal in all four extremities [Motor Strength] : muscle strength was normal in all four extremities [Sensation Tactile Decrease] : light touch was intact [Sensation Pain / Temperature Decrease] : pain and temperature was intact [Coordination - Dysmetria Impaired Finger-to-Nose Bilateral] : not present [1+] : Patella left 1+ [0] : Ankle jerk left 0 [FreeTextEntry6] : Pain limited exam of legs [FreeTextEntry8] : Antalgic gait

## 2021-06-01 ENCOUNTER — APPOINTMENT (OUTPATIENT)
Dept: NEUROSURGERY | Facility: CLINIC | Age: 71
End: 2021-06-01

## 2021-06-01 ENCOUNTER — NON-APPOINTMENT (OUTPATIENT)
Age: 71
End: 2021-06-01

## 2021-06-15 ENCOUNTER — APPOINTMENT (OUTPATIENT)
Dept: NEUROSURGERY | Facility: CLINIC | Age: 71
End: 2021-06-15
Payer: MEDICARE

## 2021-06-15 VITALS — WEIGHT: 205 LBS | OXYGEN SATURATION: 98 % | HEART RATE: 94 BPM | BODY MASS INDEX: 37.73 KG/M2 | HEIGHT: 62 IN

## 2021-06-15 DIAGNOSIS — G93.5 COMPRESSION OF BRAIN: ICD-10-CM

## 2021-06-15 PROCEDURE — 99204 OFFICE O/P NEW MOD 45 MIN: CPT

## 2021-06-17 PROBLEM — G93.5 CHIARI I MALFORMATION: Status: ACTIVE | Noted: 2020-11-04

## 2021-06-17 NOTE — RESULTS/DATA
[FreeTextEntry1] : 5/18/21 (R) MRI Lumbar wo \par Impression:  \par moderate to severe central canal stenosis at L3/4, L4/5, multilevel disc bulges and posterior element hypertrophy.\par L4/5 broad based disc impingement on the traversing Right L5 nerve root.\par Mild lumbar dextroscoliosis.  Slight anterolisthesis at L4/5.\par Mild foraminal narrowing L3/4, L4/5, L5/S1.\par

## 2021-06-17 NOTE — REVIEW OF SYSTEMS
[Tingling] : tingling [Limping] : limping [As Noted in HPI] : as noted in HPI [Limb Pain] : limb pain [Negative] : Endocrine [Abdominal Pain] : no abdominal pain [Vomiting] : no vomiting [Diarrhea] : no diarrhea [Dysuria] : no dysuria [Incontinence] : no incontinence [Skin Lesions] : no skin lesions [Easy Bleeding] : no tendency for easy bleeding [Easy Bruising] : no tendency for easy bruising [de-identified] : Denies a/c, a/p

## 2021-06-17 NOTE — HISTORY OF PRESENT ILLNESS
[FreeTextEntry1] : review imaging / Chiari 1 malformation [de-identified] : 72 yo female who arrives for an initial consult for Chiari I malformation and chronic back pain.\par Pain is in low back, and Right lateral thigh, tingling bottom of Right foot.\par Pain rated 8/10 severe at times\par \par [facility] imaging: \par 6/8/20 [facility] MRI Brain - report in [employer] \par 11/12/2020: [facility] MRI Cervical  ord by Dr. Hodge \par 5/18/21 MRI Lumbar wo - ord by Dr. Gusman

## 2021-06-17 NOTE — PHYSICAL EXAM
[General Appearance - Alert] : alert [General Appearance - In No Acute Distress] : in no acute distress [Oriented To Time, Place, And Person] : oriented to person, place, and time [Impaired Insight] : insight and judgment were intact [5] : S1 ankle flexors 5/5 [Sensation Tactile Decrease] : light touch was intact [Antalgic] : antalgic [Able to toe walk] : the patient was able to toe walk [Able to heel walk] : the patient was able to heel walk [Intact] : all sensory within normal limits bilaterally [Sclera] : the sclera and conjunctiva were normal [PERRL With Normal Accommodation] : pupils were equal in size, round, reactive to light, with normal accommodation [Hearing Threshold Finger Rub Not [place]] : hearing was normal [Neck Appearance] : the appearance of the neck was normal [] : no respiratory distress [Edema] : there was no peripheral edema [Involuntary Movements] : no involuntary movements were seen [Motor Tone] : muscle strength and tone were normal [Skin Color & Pigmentation] : normal skin color and pigmentation [Romberg's Sign] : Romberg's sign was negtive [Limited Balance] : balance was intact [Tremor] : no tremor present [FreeTextEntry1] : antalgic gait, (low back pain)

## 2021-06-17 NOTE — ASSESSMENT
[FreeTextEntry1] : Chronic low back pain with no surgical pathology observed on R 5/8/21 xrays and MRI reviewed today.  Also, 11/12/2020 MRI cervical reviewed with no surgical follow required for Chiari 1 malformation.  She should continue pain management, start PT, and follow up with neurology.\par \par 1)  Pain management - Dr. Pablito Denis\par 2)  PT - Back / LE\par f/u PRN

## 2021-06-21 ENCOUNTER — APPOINTMENT (OUTPATIENT)
Dept: ORTHOPEDIC SURGERY | Facility: CLINIC | Age: 71
End: 2021-06-21
Payer: MEDICARE

## 2021-06-21 VITALS
BODY MASS INDEX: 37.73 KG/M2 | DIASTOLIC BLOOD PRESSURE: 91 MMHG | HEIGHT: 62 IN | SYSTOLIC BLOOD PRESSURE: 144 MMHG | HEART RATE: 134 BPM | TEMPERATURE: 98 F | WEIGHT: 205 LBS

## 2021-06-21 DIAGNOSIS — M48.062 SPINAL STENOSIS, LUMBAR REGION WITH NEUROGENIC CLAUDICATION: ICD-10-CM

## 2021-06-21 PROCEDURE — 99204 OFFICE O/P NEW MOD 45 MIN: CPT

## 2021-06-21 PROCEDURE — 72110 X-RAY EXAM L-2 SPINE 4/>VWS: CPT

## 2021-06-21 RX ORDER — MULTIVITAMIN
TABLET ORAL
Refills: 0 | Status: ACTIVE | COMMUNITY

## 2021-07-14 ENCOUNTER — APPOINTMENT (OUTPATIENT)
Dept: UROLOGY | Facility: CLINIC | Age: 71
End: 2021-07-14
Payer: MEDICARE

## 2021-07-14 VITALS
TEMPERATURE: 97.2 F | SYSTOLIC BLOOD PRESSURE: 130 MMHG | HEART RATE: 100 BPM | BODY MASS INDEX: 37.73 KG/M2 | DIASTOLIC BLOOD PRESSURE: 80 MMHG | HEIGHT: 62 IN | WEIGHT: 205 LBS

## 2021-07-14 PROCEDURE — 99204 OFFICE O/P NEW MOD 45 MIN: CPT

## 2021-07-14 NOTE — HISTORY OF PRESENT ILLNESS
[FreeTextEntry1] : cc incontinence \par 72 yo fem referred for eval incontinence \par present for years \par leaks with urgency and without control \par takes oxybutin  10 mg little relief \par h/o sling many years ago \par uti 1/21 \par no dysuria \par 3 cups coffee in am \par +constipation

## 2021-07-14 NOTE — ASSESSMENT
[FreeTextEntry1] : check ua cx\par will increase oxybutinin to 15 \par start miralax\par reduce caffene \par f/u cysto

## 2021-07-16 ENCOUNTER — APPOINTMENT (OUTPATIENT)
Dept: NEUROLOGY | Facility: CLINIC | Age: 71
End: 2021-07-16

## 2021-07-16 LAB
APPEARANCE: CLEAR
BACTERIA UR CULT: NORMAL
BACTERIA: NEGATIVE
BILIRUBIN URINE: NEGATIVE
BLOOD URINE: NEGATIVE
COLOR: NORMAL
GLUCOSE QUALITATIVE U: NEGATIVE
HYALINE CASTS: 0 /LPF
KETONES URINE: NEGATIVE
LEUKOCYTE ESTERASE URINE: NEGATIVE
MICROSCOPIC-UA: NORMAL
NITRITE URINE: NEGATIVE
PH URINE: 6.5
PROTEIN URINE: NORMAL
RED BLOOD CELLS URINE: 2 /HPF
SPECIFIC GRAVITY URINE: 1.02
SQUAMOUS EPITHELIAL CELLS: 3 /HPF
UROBILINOGEN URINE: NORMAL
WHITE BLOOD CELLS URINE: 2 /HPF

## 2021-07-23 ENCOUNTER — APPOINTMENT (OUTPATIENT)
Dept: UROLOGY | Facility: CLINIC | Age: 71
End: 2021-07-23

## 2021-08-04 ENCOUNTER — APPOINTMENT (OUTPATIENT)
Dept: UROLOGY | Facility: CLINIC | Age: 71
End: 2021-08-04
Payer: MEDICARE

## 2021-08-04 DIAGNOSIS — R32 UNSPECIFIED URINARY INCONTINENCE: ICD-10-CM

## 2021-08-04 PROCEDURE — 52000 CYSTOURETHROSCOPY: CPT

## 2021-08-12 PROBLEM — M54.16 CHRONIC LUMBAR RADICULOPATHY: Status: ACTIVE | Noted: 2021-06-15

## 2021-08-17 ENCOUNTER — APPOINTMENT (OUTPATIENT)
Dept: NEUROSURGERY | Facility: CLINIC | Age: 71
End: 2021-08-17

## 2021-08-17 DIAGNOSIS — M54.16 RADICULOPATHY, LUMBAR REGION: ICD-10-CM

## 2021-08-17 PROBLEM — R32 URINARY INCONTINENCE IN FEMALE: Status: ACTIVE | Noted: 2021-07-14

## 2021-10-18 NOTE — HISTORY OF PRESENT ILLNESS
[FreeTextEntry1] : The patient has been suffering from chronic low back pain. Pain radiates to her right lateral thigh with tingling in the bottom of her right foot. Pain is severe and rate at 8/10. MRI lumbar spine done and noted with central canal stenosis at L3-4 and L4-5 with impingement of the traversing right L5 nerve root. She was last seen in June 2021 and was advised to continue conservative management.

## 2021-10-18 NOTE — ASSESSMENT
[FreeTextEntry1] : 71 years old woman with chronic low back pain radiating to right thigh / leg. MRI noted with central canal stenosis at L3-4 and L4-5

## 2021-11-01 ENCOUNTER — EMERGENCY (EMERGENCY)
Facility: HOSPITAL | Age: 71
LOS: 1 days | Discharge: HOME | End: 2021-11-01
Attending: EMERGENCY MEDICINE
Payer: COMMERCIAL

## 2021-11-01 VITALS
HEART RATE: 80 BPM | OXYGEN SATURATION: 100 % | TEMPERATURE: 98 F | RESPIRATION RATE: 18 BRPM | SYSTOLIC BLOOD PRESSURE: 138 MMHG | DIASTOLIC BLOOD PRESSURE: 85 MMHG

## 2021-11-01 VITALS
OXYGEN SATURATION: 99 % | HEIGHT: 63 IN | SYSTOLIC BLOOD PRESSURE: 152 MMHG | RESPIRATION RATE: 20 BRPM | DIASTOLIC BLOOD PRESSURE: 81 MMHG | WEIGHT: 205.03 LBS | TEMPERATURE: 98 F | HEART RATE: 98 BPM

## 2021-11-01 DIAGNOSIS — Z96.651 PRESENCE OF RIGHT ARTIFICIAL KNEE JOINT: Chronic | ICD-10-CM

## 2021-11-01 LAB
ALBUMIN SERPL ELPH-MCNC: 3.8 G/DL — SIGNIFICANT CHANGE UP (ref 3.3–5)
ALP SERPL-CCNC: 102 U/L — SIGNIFICANT CHANGE UP (ref 40–120)
ALT FLD-CCNC: 16 U/L — SIGNIFICANT CHANGE UP (ref 10–45)
ANION GAP SERPL CALC-SCNC: 13 MMOL/L — SIGNIFICANT CHANGE UP (ref 5–17)
APPEARANCE UR: CLEAR — SIGNIFICANT CHANGE UP
APTT BLD: 38.9 SEC — HIGH (ref 27.5–35.5)
AST SERPL-CCNC: 31 U/L — SIGNIFICANT CHANGE UP (ref 10–40)
BACTERIA # UR AUTO: ABNORMAL
BASE EXCESS BLDV CALC-SCNC: 6.2 MMOL/L — HIGH (ref -2–2)
BASOPHILS # BLD AUTO: 0.04 K/UL — SIGNIFICANT CHANGE UP (ref 0–0.2)
BASOPHILS NFR BLD AUTO: 0.4 % — SIGNIFICANT CHANGE UP (ref 0–2)
BILIRUB SERPL-MCNC: 0.4 MG/DL — SIGNIFICANT CHANGE UP (ref 0.2–1.2)
BILIRUB UR-MCNC: NEGATIVE — SIGNIFICANT CHANGE UP
BUN SERPL-MCNC: 11 MG/DL — SIGNIFICANT CHANGE UP (ref 7–23)
CA-I SERPL-SCNC: 1.25 MMOL/L — SIGNIFICANT CHANGE UP (ref 1.15–1.33)
CALCIUM SERPL-MCNC: 9.8 MG/DL — SIGNIFICANT CHANGE UP (ref 8.4–10.5)
CHLORIDE BLDV-SCNC: 99 MMOL/L — SIGNIFICANT CHANGE UP (ref 96–108)
CHLORIDE SERPL-SCNC: 99 MMOL/L — SIGNIFICANT CHANGE UP (ref 96–108)
CO2 BLDV-SCNC: 35 MMOL/L — HIGH (ref 22–26)
CO2 SERPL-SCNC: 25 MMOL/L — SIGNIFICANT CHANGE UP (ref 22–31)
COLOR SPEC: SIGNIFICANT CHANGE UP
CREAT SERPL-MCNC: 0.62 MG/DL — SIGNIFICANT CHANGE UP (ref 0.5–1.3)
DIFF PNL FLD: ABNORMAL
EOSINOPHIL # BLD AUTO: 0.19 K/UL — SIGNIFICANT CHANGE UP (ref 0–0.5)
EOSINOPHIL NFR BLD AUTO: 2.1 % — SIGNIFICANT CHANGE UP (ref 0–6)
EPI CELLS # UR: 1 /HPF — SIGNIFICANT CHANGE UP
GAS PNL BLDV: 135 MMOL/L — LOW (ref 136–145)
GAS PNL BLDV: SIGNIFICANT CHANGE UP
GLUCOSE BLDV-MCNC: 99 MG/DL — SIGNIFICANT CHANGE UP (ref 70–99)
GLUCOSE SERPL-MCNC: 100 MG/DL — HIGH (ref 70–99)
GLUCOSE UR QL: NEGATIVE — SIGNIFICANT CHANGE UP
HCO3 BLDV-SCNC: 33 MMOL/L — HIGH (ref 22–29)
HCT VFR BLD CALC: 42.3 % — SIGNIFICANT CHANGE UP (ref 34.5–45)
HCT VFR BLDA CALC: 42 % — SIGNIFICANT CHANGE UP (ref 34.5–46.5)
HGB BLD CALC-MCNC: 14 G/DL — SIGNIFICANT CHANGE UP (ref 11.7–16.1)
HGB BLD-MCNC: 13.6 G/DL — SIGNIFICANT CHANGE UP (ref 11.5–15.5)
HYALINE CASTS # UR AUTO: 0 /LPF — SIGNIFICANT CHANGE UP (ref 0–2)
IMM GRANULOCYTES NFR BLD AUTO: 0.2 % — SIGNIFICANT CHANGE UP (ref 0–1.5)
INR BLD: 1.24 RATIO — HIGH (ref 0.88–1.16)
KETONES UR-MCNC: NEGATIVE — SIGNIFICANT CHANGE UP
LACTATE BLDV-MCNC: 1.7 MMOL/L — SIGNIFICANT CHANGE UP (ref 0.7–2)
LEUKOCYTE ESTERASE UR-ACNC: ABNORMAL
LIDOCAIN IGE QN: 23 U/L — SIGNIFICANT CHANGE UP (ref 7–60)
LYMPHOCYTES # BLD AUTO: 2.54 K/UL — SIGNIFICANT CHANGE UP (ref 1–3.3)
LYMPHOCYTES # BLD AUTO: 28.3 % — SIGNIFICANT CHANGE UP (ref 13–44)
MAGNESIUM SERPL-MCNC: 1.9 MG/DL — SIGNIFICANT CHANGE UP (ref 1.6–2.6)
MCHC RBC-ENTMCNC: 29 PG — SIGNIFICANT CHANGE UP (ref 27–34)
MCHC RBC-ENTMCNC: 32.2 GM/DL — SIGNIFICANT CHANGE UP (ref 32–36)
MCV RBC AUTO: 90.2 FL — SIGNIFICANT CHANGE UP (ref 80–100)
MONOCYTES # BLD AUTO: 1.03 K/UL — HIGH (ref 0–0.9)
MONOCYTES NFR BLD AUTO: 11.5 % — SIGNIFICANT CHANGE UP (ref 2–14)
NEUTROPHILS # BLD AUTO: 5.15 K/UL — SIGNIFICANT CHANGE UP (ref 1.8–7.4)
NEUTROPHILS NFR BLD AUTO: 57.5 % — SIGNIFICANT CHANGE UP (ref 43–77)
NITRITE UR-MCNC: POSITIVE
NRBC # BLD: 0 /100 WBCS — SIGNIFICANT CHANGE UP (ref 0–0)
NT-PROBNP SERPL-SCNC: 736 PG/ML — HIGH (ref 0–300)
PCO2 BLDV: 56 MMHG — HIGH (ref 39–42)
PH BLDV: 7.38 — SIGNIFICANT CHANGE UP (ref 7.32–7.43)
PH UR: 7 — SIGNIFICANT CHANGE UP (ref 5–8)
PLATELET # BLD AUTO: 369 K/UL — SIGNIFICANT CHANGE UP (ref 150–400)
PO2 BLDV: 32 MMHG — SIGNIFICANT CHANGE UP (ref 25–45)
POTASSIUM BLDV-SCNC: 4.5 MMOL/L — SIGNIFICANT CHANGE UP (ref 3.5–5.1)
POTASSIUM SERPL-MCNC: 4.9 MMOL/L — SIGNIFICANT CHANGE UP (ref 3.5–5.3)
POTASSIUM SERPL-SCNC: 4.9 MMOL/L — SIGNIFICANT CHANGE UP (ref 3.5–5.3)
PROT SERPL-MCNC: 7.7 G/DL — SIGNIFICANT CHANGE UP (ref 6–8.3)
PROT UR-MCNC: ABNORMAL
PROTHROM AB SERPL-ACNC: 14.7 SEC — HIGH (ref 10.6–13.6)
RBC # BLD: 4.69 M/UL — SIGNIFICANT CHANGE UP (ref 3.8–5.2)
RBC # FLD: 12.7 % — SIGNIFICANT CHANGE UP (ref 10.3–14.5)
RBC CASTS # UR COMP ASSIST: 3 /HPF — SIGNIFICANT CHANGE UP (ref 0–4)
SAO2 % BLDV: 52.3 % — LOW (ref 67–88)
SODIUM SERPL-SCNC: 137 MMOL/L — SIGNIFICANT CHANGE UP (ref 135–145)
SP GR SPEC: 1.01 — LOW (ref 1.01–1.02)
TROPONIN T, HIGH SENSITIVITY RESULT: 6 NG/L — SIGNIFICANT CHANGE UP (ref 0–51)
UROBILINOGEN FLD QL: NEGATIVE — SIGNIFICANT CHANGE UP
WBC # BLD: 8.97 K/UL — SIGNIFICANT CHANGE UP (ref 3.8–10.5)
WBC # FLD AUTO: 8.97 K/UL — SIGNIFICANT CHANGE UP (ref 3.8–10.5)
WBC UR QL: 7 /HPF — HIGH (ref 0–5)

## 2021-11-01 PROCEDURE — 93010 ELECTROCARDIOGRAM REPORT: CPT

## 2021-11-01 PROCEDURE — 85014 HEMATOCRIT: CPT

## 2021-11-01 PROCEDURE — 82330 ASSAY OF CALCIUM: CPT

## 2021-11-01 PROCEDURE — 84132 ASSAY OF SERUM POTASSIUM: CPT

## 2021-11-01 PROCEDURE — 83690 ASSAY OF LIPASE: CPT

## 2021-11-01 PROCEDURE — 84484 ASSAY OF TROPONIN QUANT: CPT

## 2021-11-01 PROCEDURE — 82803 BLOOD GASES ANY COMBINATION: CPT

## 2021-11-01 PROCEDURE — 71045 X-RAY EXAM CHEST 1 VIEW: CPT | Mod: 26

## 2021-11-01 PROCEDURE — 81001 URINALYSIS AUTO W/SCOPE: CPT

## 2021-11-01 PROCEDURE — 87086 URINE CULTURE/COLONY COUNT: CPT

## 2021-11-01 PROCEDURE — 82435 ASSAY OF BLOOD CHLORIDE: CPT

## 2021-11-01 PROCEDURE — 84295 ASSAY OF SERUM SODIUM: CPT

## 2021-11-01 PROCEDURE — 80053 COMPREHEN METABOLIC PANEL: CPT

## 2021-11-01 PROCEDURE — 99285 EMERGENCY DEPT VISIT HI MDM: CPT | Mod: 25

## 2021-11-01 PROCEDURE — 71045 X-RAY EXAM CHEST 1 VIEW: CPT

## 2021-11-01 PROCEDURE — 93005 ELECTROCARDIOGRAM TRACING: CPT

## 2021-11-01 PROCEDURE — 36415 COLL VENOUS BLD VENIPUNCTURE: CPT

## 2021-11-01 PROCEDURE — 99284 EMERGENCY DEPT VISIT MOD MDM: CPT | Mod: 25

## 2021-11-01 PROCEDURE — 85730 THROMBOPLASTIN TIME PARTIAL: CPT

## 2021-11-01 PROCEDURE — 83605 ASSAY OF LACTIC ACID: CPT

## 2021-11-01 PROCEDURE — 83735 ASSAY OF MAGNESIUM: CPT

## 2021-11-01 PROCEDURE — 83880 ASSAY OF NATRIURETIC PEPTIDE: CPT

## 2021-11-01 PROCEDURE — 82947 ASSAY GLUCOSE BLOOD QUANT: CPT

## 2021-11-01 PROCEDURE — 85025 COMPLETE CBC W/AUTO DIFF WBC: CPT

## 2021-11-01 PROCEDURE — 85610 PROTHROMBIN TIME: CPT

## 2021-11-01 PROCEDURE — 85018 HEMOGLOBIN: CPT

## 2021-11-01 RX ORDER — CEPHALEXIN 500 MG
1 CAPSULE ORAL
Qty: 14 | Refills: 0
Start: 2021-11-01 | End: 2021-11-07

## 2021-11-01 RX ORDER — ASPIRIN/CALCIUM CARB/MAGNESIUM 324 MG
162 TABLET ORAL ONCE
Refills: 0 | Status: COMPLETED | OUTPATIENT
Start: 2021-11-01 | End: 2021-11-01

## 2021-11-01 RX ORDER — CEPHALEXIN 500 MG
500 CAPSULE ORAL ONCE
Refills: 0 | Status: COMPLETED | OUTPATIENT
Start: 2021-11-01 | End: 2021-11-01

## 2021-11-01 RX ADMIN — Medication 500 MILLIGRAM(S): at 22:34

## 2021-11-01 RX ADMIN — Medication 162 MILLIGRAM(S): at 18:26

## 2021-11-01 NOTE — ED PROVIDER NOTE - CLINICAL SUMMARY MEDICAL DECISION MAKING FREE TEXT BOX
Gretel Jean-Baptiste MD - Attending Physician: PT here with acute on chronic SOB, associated brief episodic chest pain episodes for over 1 week. Atypical symptoms, no specific trigger, no specific relieving factors. Has cardiologist. Labs, Xr, likely outpatient follow-up

## 2021-11-01 NOTE — ED PROVIDER NOTE - PROGRESS NOTE DETAILS
Ada is a 70yo female who is presenting with intermittent chest pain and exertional dyspnea likely secondary to developing angina vs uncontrolled A-fib. She has been taking her Metoprolol with increased prescribed dosing without improvement in her rhythm. On exam, she is in A-fib, her lungs are clear, she has +1 pitting edema of the lower extremities. Her EKG shows a-fib without ST changes, her labs were normal (Trop of 6) except for a mildly elevated BNP to 700's. Will get CXR to evaluate for cardiomegaly and/or pulmonary edema. If normal, will discharge home with close cardiology follow up for an echo and/or stress test.  Fellow Note: Hilda Ballard, DO PGY-6 CXR clear and UA showing signs of UTI. Will start on a course of Keflex. Follow up with outpatient Cardiology for full work-up.  Fellow Note: Hilda Ballard, DO PGY-6 CXR clear and UA showing signs of UTI. Will start on a course of Keflex. Follow up with outpatient Cardiology, Dr. Katie Gayle, for full work-up.  Fellow Note: Hilda Ballard, DO PGY-6

## 2021-11-01 NOTE — ED PROVIDER NOTE - NSFOLLOWUPINSTRUCTIONS_ED_ALL_ED_FT
Please call your Cardiologist to make an outpatient appointment for further workup.     Please call your doctor or go to the ER is you are experiencing:  Trouble breathing  Feeling lightheaded, faint, or dizzy  Rapid heart beat  Slower than usual heart rate compared to your normal  Chest pain with weakness, dizziness, fainting, heavy sweating, nausea, or vomiting  Extreme drowsiness, or confusion  Weakness of an arm or leg or on one side of the face  Trouble with speech or vision

## 2021-11-01 NOTE — ED PROVIDER NOTE - NSICDXPASTSURGICALHX_GEN_ALL_CORE_FT
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

## 2021-11-01 NOTE — ED PROVIDER NOTE - ENMT NEGATIVE STATEMENT, MLM
Nose: no nasal congestion and no nasal drainage. Mouth/Throat: no throat pain. Neck: no lumps, no pain, no stiffness and no swollen glands.

## 2021-11-01 NOTE — ED ADULT NURSE NOTE - OBJECTIVE STATEMENT
Pt 70 y/o female, AxOx3, presents to ED from home complaining of worsening chest pain x 10 days. PMH of recent dx of afib -placed on eliquis and metoprolol starting at 25 mg. Pt's cardiologist has been increasing metoprolol dosage due to pt worsening chest pain. Pt currently taking 100mg of metoprolol. Pt is well appearing, speaking full sentences without difficulty. Breathing spontaneous and unlabored. Endorsing midsternal chest pain and mild shortness or breath, dizziness. Upon assessment, abdomen soft and nontender, +strong peripheral pulses, moving all extremities without difficulty, swelling noted to b/l LE which is improved compared to baseline. Safety and comfort measures initiated- bed placed in lowest position and side rails raised. Pt oriented to call bell system.

## 2021-11-01 NOTE — ED PROVIDER NOTE - PATIENT PORTAL LINK FT
You can access the FollowMyHealth Patient Portal offered by [facility] by registering at the following website: [email or web address]. By joining [facility]’s FollowMyHealth portal, you will also be able to view your health information using other applications (apps) compatible with our system.

## 2021-11-01 NOTE — ED PROVIDER NOTE - OBJECTIVE STATEMENT
Sumi is a 70yo female with PMHx of a-fib (on Metoprolol 100mg and Eliquis) who is presenting with progressively worsening shortness of breath and intermittent chest pain for several weeks. The chest pain comes and goes throughout the day without a known trigger. Occurs both at rest and with exertion. She describes it as squeezing, with radiation to the left shoulder and sometimes the right shoulder. The pain has woken her up from sleep a few times. She sleeps with 1 pillow at night and denies any dyspnea while laying flat. It is associated with dizziness and SOB. The SOB occurs with exertion such as walking up 1 flight of stairs. The metoprolol has been steadily increased from 25 to 50 to 100mg (current dose). No recent illnesses, fever, trauma, vomiting, diarrhea, or syncope.   Fellow Note: Hilda Ballard, DO PGY-6 Smui is a 70yo female with PMHx of a-fib (on Metoprolol 100mg and Eliquis) who is presenting with progressively worsening shortness of breath and intermittent chest pain for several weeks. The chest pain comes and goes throughout the day without a known trigger. Occurs both at rest and with exertion. She describes it as squeezing, with radiation to the left shoulder and sometimes the right shoulder. The pain has woken her up from sleep a few times. She sleeps with 1 pillow at night and denies any dyspnea while laying flat. The SOB occurs with exertion such as walking up 1 flight of stairs. The metoprolol has been steadily increased from 25 to 50 to 100mg (current dose), which is what pt attributes the SOB to. No recent illnesses, fever, trauma, vomiting, diarrhea, or syncope.   Fellow Note: Hilda Ballard, DO PGY-6

## 2021-11-01 NOTE — ED PROVIDER NOTE - CARE PROVIDER_API CALL
Katie Gayle  CARDIOVASCULAR DISEASE  [address] [place] [address]  Phone: [phone]  Fax: [phone]  Established Patient  Follow Up Time: Urgent

## 2021-11-01 NOTE — ED ADULT NURSE NOTE - NSICDXPASTMEDICALHX_GEN_ALL_CORE_FT
PAST MEDICAL HISTORY:  HTN (Hypertension)     Morbid obesity due to excess calories     Muscle Cramps at Night     No blood products Pt is Jehovah Witness    HARIS (obstructive sleep apnea) noncompliant with CPAP    Patient is Hinduism refuse blood products    Pericarditis around age 30's    Primary osteoarthritis of right knee

## 2021-11-01 NOTE — ED PROVIDER NOTE - RAPID ASSESSMENT
71 F with PMHx of HTN, fibromyalgia, pericarditis, and recently dx with afib in july 2021 on Eliquis presents to the ER c/o chest discomfort [id number]. Pt reports change in metoprolol dosage. Was initially endorsing 50mg, however, cardiologist increased dose to 100mg. Pt noted it "gave me breathing issues" to which pt stopped endorsing it 24 hours ago. Pt in NAD      Scribe Statement: I, Mariah Hoang, attest that this documentation has been prepared under the direction and in the presence of Deshaun Vela) 71 F with PMHx of HTN, fibromyalgia, pericarditis, and recently dx with afib in july 2021 on Eliquis presents to the ER c/o chest discomfort [id number]. Pt reports change in metoprolol dosage. Was initially endorsing 50mg, however, cardiologist increased dose to 100mg. Pt noted it "gave me breathing issues" to which pt stopped endorsing it 24 hours ago. Pt in NAD      Scribe Statement: I, Mariah Hoang, attest that this documentation has been prepared under the direction and in the presence of Deshaun Vela (MD)  Deshaun Vela MD note: The scribe's documentation has been prepared under my direction and personally reviewed by me.  Patient was seen as a tele QDOC patient, the patient will be seen and further worked up in the main emergency department and their care will be completed by the main emergency department team along with a thorough physical exam. Receiving team will follow up on labs, analgesia, any clinical imaging, reassess and disposition as clinically indicated, all decisions regarding the progression of care will be made at their discretion.

## 2021-11-01 NOTE — ED PROVIDER NOTE - NSICDXPASTMEDICALHX_GEN_ALL_CORE_FT
PAST MEDICAL HISTORY:  HTN (Hypertension)     Morbid obesity due to excess calories     Muscle Cramps at Night     No blood products Pt is Jehovah Witness    HARIS (obstructive sleep apnea) noncompliant with CPAP    Patient is Yazdanism refuse blood products    Pericarditis around age 30's    Primary osteoarthritis of right knee

## 2021-11-03 LAB
CULTURE RESULTS: SIGNIFICANT CHANGE UP
SPECIMEN SOURCE: SIGNIFICANT CHANGE UP

## 2021-11-09 ENCOUNTER — APPOINTMENT (OUTPATIENT)
Dept: DISASTER EMERGENCY | Facility: CLINIC | Age: 71
End: 2021-11-09

## 2021-11-09 ENCOUNTER — LABORATORY RESULT (OUTPATIENT)
Age: 71
End: 2021-11-09

## 2021-11-09 ENCOUNTER — NON-APPOINTMENT (OUTPATIENT)
Age: 71
End: 2021-11-09

## 2021-11-12 ENCOUNTER — OUTPATIENT (OUTPATIENT)
Dept: OUTPATIENT SERVICES | Facility: HOSPITAL | Age: 71
LOS: 1 days | End: 2021-11-12
Payer: COMMERCIAL

## 2021-11-12 VITALS
SYSTOLIC BLOOD PRESSURE: 133 MMHG | HEIGHT: 63 IN | RESPIRATION RATE: 16 BRPM | TEMPERATURE: 98 F | WEIGHT: 205.03 LBS | DIASTOLIC BLOOD PRESSURE: 63 MMHG | HEART RATE: 88 BPM | OXYGEN SATURATION: 99 %

## 2021-11-12 VITALS
SYSTOLIC BLOOD PRESSURE: 122 MMHG | HEART RATE: 90 BPM | DIASTOLIC BLOOD PRESSURE: 56 MMHG | OXYGEN SATURATION: 95 % | RESPIRATION RATE: 18 BRPM

## 2021-11-12 DIAGNOSIS — R06.00 DYSPNEA, UNSPECIFIED: ICD-10-CM

## 2021-11-12 DIAGNOSIS — R07.89 OTHER CHEST PAIN: ICD-10-CM

## 2021-11-12 DIAGNOSIS — R94.39 ABNORMAL RESULT OF OTHER CARDIOVASCULAR FUNCTION STUDY: ICD-10-CM

## 2021-11-12 DIAGNOSIS — Z96.651 PRESENCE OF RIGHT ARTIFICIAL KNEE JOINT: Chronic | ICD-10-CM

## 2021-11-12 LAB
ALBUMIN SERPL ELPH-MCNC: 3.7 G/DL — SIGNIFICANT CHANGE UP (ref 3.3–5)
ALP SERPL-CCNC: 86 U/L — SIGNIFICANT CHANGE UP (ref 40–120)
ALT FLD-CCNC: 13 U/L — SIGNIFICANT CHANGE UP (ref 10–45)
ANION GAP SERPL CALC-SCNC: 11 MMOL/L — SIGNIFICANT CHANGE UP (ref 5–17)
AST SERPL-CCNC: 23 U/L — SIGNIFICANT CHANGE UP (ref 10–40)
BILIRUB SERPL-MCNC: 0.3 MG/DL — SIGNIFICANT CHANGE UP (ref 0.2–1.2)
BUN SERPL-MCNC: 14 MG/DL — SIGNIFICANT CHANGE UP (ref 7–23)
CALCIUM SERPL-MCNC: 9.3 MG/DL — SIGNIFICANT CHANGE UP (ref 8.4–10.5)
CHLORIDE SERPL-SCNC: 102 MMOL/L — SIGNIFICANT CHANGE UP (ref 96–108)
CO2 SERPL-SCNC: 24 MMOL/L — SIGNIFICANT CHANGE UP (ref 22–31)
CREAT SERPL-MCNC: 0.69 MG/DL — SIGNIFICANT CHANGE UP (ref 0.5–1.3)
GLUCOSE SERPL-MCNC: 113 MG/DL — HIGH (ref 70–99)
HCT VFR BLD CALC: 39.3 % — SIGNIFICANT CHANGE UP (ref 34.5–45)
HGB BLD-MCNC: 13.1 G/DL — SIGNIFICANT CHANGE UP (ref 11.5–15.5)
MCHC RBC-ENTMCNC: 29.6 PG — SIGNIFICANT CHANGE UP (ref 27–34)
MCHC RBC-ENTMCNC: 33.3 GM/DL — SIGNIFICANT CHANGE UP (ref 32–36)
MCV RBC AUTO: 88.9 FL — SIGNIFICANT CHANGE UP (ref 80–100)
NRBC # BLD: 0 /100 WBCS — SIGNIFICANT CHANGE UP (ref 0–0)
PLATELET # BLD AUTO: 309 K/UL — SIGNIFICANT CHANGE UP (ref 150–400)
POTASSIUM SERPL-MCNC: 4.3 MMOL/L — SIGNIFICANT CHANGE UP (ref 3.5–5.3)
POTASSIUM SERPL-SCNC: 4.3 MMOL/L — SIGNIFICANT CHANGE UP (ref 3.5–5.3)
PROT SERPL-MCNC: 7 G/DL — SIGNIFICANT CHANGE UP (ref 6–8.3)
RBC # BLD: 4.42 M/UL — SIGNIFICANT CHANGE UP (ref 3.8–5.2)
RBC # FLD: 12.5 % — SIGNIFICANT CHANGE UP (ref 10.3–14.5)
SODIUM SERPL-SCNC: 137 MMOL/L — SIGNIFICANT CHANGE UP (ref 135–145)
WBC # BLD: 8.17 K/UL — SIGNIFICANT CHANGE UP (ref 3.8–10.5)
WBC # FLD AUTO: 8.17 K/UL — SIGNIFICANT CHANGE UP (ref 3.8–10.5)

## 2021-11-12 PROCEDURE — 93005 ELECTROCARDIOGRAM TRACING: CPT

## 2021-11-12 PROCEDURE — 93454 CORONARY ARTERY ANGIO S&I: CPT

## 2021-11-12 PROCEDURE — 93454 CORONARY ARTERY ANGIO S&I: CPT | Mod: 26

## 2021-11-12 PROCEDURE — 85027 COMPLETE CBC AUTOMATED: CPT

## 2021-11-12 PROCEDURE — 80053 COMPREHEN METABOLIC PANEL: CPT

## 2021-11-12 PROCEDURE — C1894: CPT

## 2021-11-12 PROCEDURE — C1769: CPT

## 2021-11-12 PROCEDURE — 93010 ELECTROCARDIOGRAM REPORT: CPT

## 2021-11-12 PROCEDURE — 99152 MOD SED SAME PHYS/QHP 5/>YRS: CPT

## 2021-11-12 PROCEDURE — C1887: CPT

## 2021-11-12 RX ORDER — APIXABAN 2.5 MG/1
1 TABLET, FILM COATED ORAL
Qty: 0 | Refills: 0 | DISCHARGE

## 2021-11-12 RX ORDER — HYDROCHLOROTHIAZIDE 25 MG
0 TABLET ORAL
Qty: 0 | Refills: 0 | DISCHARGE

## 2021-11-12 RX ORDER — VALSARTAN 80 MG/1
1 TABLET ORAL
Qty: 0 | Refills: 0 | DISCHARGE

## 2021-11-12 RX ORDER — SIMVASTATIN 20 MG/1
1 TABLET, FILM COATED ORAL
Qty: 0 | Refills: 0 | DISCHARGE

## 2021-11-12 RX ORDER — SODIUM CHLORIDE 9 MG/ML
3 INJECTION INTRAMUSCULAR; INTRAVENOUS; SUBCUTANEOUS EVERY 8 HOURS
Refills: 0 | Status: DISCONTINUED | OUTPATIENT
Start: 2021-11-12 | End: 2021-11-26

## 2021-11-12 NOTE — ASU DISCHARGE PLAN (ADULT/PEDIATRIC) - ASU DC SPECIAL INSTRUCTIONSFT
Wound Care:   the day AFTER your procedure remove bandage GENTLY, and clean using  mild soap and gentle warm, water stream, pat dry. Leave OPEN to air. YOU MAY SHOWER.  DO NOT apply lotions, creams, ointments, powder, perfumes to your incision site  DO NOT SOAK your site for 1 week ( no baths, no pools, no tubs)  Check  your groin and/or wrist daily. A small amount of bruising and soreness are normal.    ACTIVITY: For 24 hours   - DO NOT DRIVE  - DO NOT make any important decisions or sign legal documents   - DO NOT operate heavy machinery   - you may resume sexual activity in 48 hours, unless otherwise instructed by your cardiologist     If your procedure was done through the WRIST: For the next 3 days  - avoid pushing, pulling, with that affected wrist   - avoid repeated movement of that hand and wrist ( eg: typing, hammering)  - DO NOT LIFT anything more than 5 lbs     If your procedure was done through the GROIN: For the next 5 days  - Limit climbing stairs, DO NOT soak in bathtub or pool  - no strenuous activities, pushing, pulling, straining  - Do not lift anything 10lbs or heavier     MEDICATION:   Take your medications as explained (see discharge paperwork).  If you received a STENT, you will be taking antiplatelet medications to KEEP YOUR STENT OPEN ( eg: Aspirin, Plavix, Brilinta, Effient, etc).  Take as prescribed DO NOT STOP taking them without consulting with your cardiologist first.     Follow heart healthy diet recommended by your doctor. If you smoke STOP SMOKING ( you may call [phone] for center of tobacco control if you need assistance)     CALL your doctor to make appointment in 2 WEEKS     ***CALL YOUR DOCTOR***  if you experience: fever, chills, body aches, or severe pain, swelling, redness, heat or yellow discharge at incision site  If you experience Bleeding or excruciating pain at the procedural site, swelling ( golf ball size) at your procedural site  If you experience CHEST PAIN  If you experience extremity numbness, tingling, temperature change (of your procedural site)   If you are unable to reach your doctor, you may contact:   -Cardiology Office at [facility] at [phone] or   - [facility] [phone]  - [facility] [phone]

## 2021-11-12 NOTE — H&P CARDIOLOGY - NSICDXFAMILYHX_GEN_ALL_CORE_FT
FAMILY HISTORY:  Father  Still living? No  Family hx of lung cancer, Age at diagnosis: Age Unknown    Sibling  Still living? Unknown  Family history of congenital heart disease, Age at diagnosis: Age Unknown

## 2021-11-12 NOTE — ASU DISCHARGE PLAN (ADULT/PEDIATRIC) - CARE PROVIDER_API CALL
Katie Gayle  CARDIOVASCULAR DISEASE  [address] [place] [address]  Phone: [phone]  Fax: [phone]  Follow Up Time:

## 2021-11-12 NOTE — H&P CARDIOLOGY - NSICDXPASTMEDICALHX_GEN_ALL_CORE_FT
PAST MEDICAL HISTORY:  HTN (Hypertension)     Morbid obesity due to excess calories     Muscle Cramps at Night     No blood products Pt is Jehovah Witness    HARIS (obstructive sleep apnea) noncompliant with CPAP    HRAIS on CPAP     Patient is Christian refuse blood products    Pericarditis around age 30's    Primary osteoarthritis of right knee

## 2022-01-03 NOTE — ASU PATIENT PROFILE, ADULT - PSH
Bladder Prolapse, Acquired  s/p repair  Status Post Breast Lumpectomy  right benign  Status post total right knee replacement  10/25/17  Tubal Ligation resp failure

## 2022-01-26 ENCOUNTER — APPOINTMENT (OUTPATIENT)
Dept: PULMONOLOGY | Facility: CLINIC | Age: 72
End: 2022-01-26
Payer: MEDICARE

## 2022-01-26 VITALS
SYSTOLIC BLOOD PRESSURE: 158 MMHG | RESPIRATION RATE: 15 BRPM | BODY MASS INDEX: 38.46 KG/M2 | WEIGHT: 209 LBS | DIASTOLIC BLOOD PRESSURE: 72 MMHG | HEART RATE: 98 BPM | OXYGEN SATURATION: 98 % | TEMPERATURE: 97.5 F | HEIGHT: 62 IN

## 2022-01-26 PROCEDURE — 99204 OFFICE O/P NEW MOD 45 MIN: CPT

## 2022-01-26 RX ORDER — PANTOPRAZOLE 40 MG/1
40 TABLET, DELAYED RELEASE ORAL
Refills: 0 | Status: ACTIVE | COMMUNITY

## 2022-01-26 RX ORDER — APIXABAN 5 MG/1
5 TABLET, FILM COATED ORAL
Refills: 0 | Status: ACTIVE | COMMUNITY

## 2022-01-26 NOTE — HISTORY OF PRESENT ILLNESS
[Snoring] : snoring [Witnessed Apneas] : witnessed sleep apnea [Frequent Nocturnal Awakening] : frequent nocturnal awakening [Unintentional Sleep While Inactive] : unintentional sleep while inactive [Awakes Unrefreshed] : awakening unrefreshed [Daytime Somnolence] : daytime somnolence [To Bed: ___] : ~he/she~ goes to bed at [unfilled] [Arises: ___] : arises at [unfilled] [Sleep Onset Latency: ___ minutes] : sleep onset latency of [unfilled] minutes reported [Nocturnal Awakenings: ___] : ~he/she~ typically has [unfilled] nocturnal awakenings [WASO: ___] : Wake time after sleep onset is [unfilled] [TST: ___] : Total sleep time is [unfilled] [Daytime Sleep: ___] : daytime sleep: [unfilled] [FreeTextEntry1] : 71 year old female referred for further management of HARIS.\par \par Comorbid medical conditions include hypertension, atrial fibrillation, asthma, GERD, fibromyalgia, anxiety/depression.\par \par Patient had a prior diagnosis of HARIS. HST - ApneaLink (5/30/2018) showed an AHI of 35/hr. There is a CPAP titration (4/26/2018) showed an optimal pressure of 15 cm H2O. She tried CPAP but could not tolerate. She has a ResMed device at home but is not in use. She feels her symptoms are worsening and was recently diagnosed with atrial fibrillation so was advised to follow up for management. She has lost about 20 lbs since time of HARIS diagnosis.  Other sleep-related symptoms and sleep schedule are as listed below.  [Unintentional Sleep while Active] : no unintentional sleep while active [Awakes with Headache] : no headache upon awakening [Awakening With Dry Mouth] : no dry mouth upon awakening [Recent  Weight Gain] : no recent weight gain [ESS] : 9

## 2022-01-26 NOTE — REVIEW OF SYSTEMS
[EDS: ESS=____] : daytime somnolence: ESS=[unfilled] [Obesity] : obesity [Nocturia] : nocturia [Nasal Congestion] : no nasal congestion [A.M. Headache] : no headache present upon awakening [Heartburn] : no heartburn

## 2022-01-26 NOTE — ASSESSMENT
[FreeTextEntry1] : This is a 71 year old female referred for evaluation and management of obstructive sleep apnea. The patient has a prior diagnosis of severe HARIS and continues to have multiple signs and symptoms of sleep-disordered breathing including and was also recently diagnosed with atrial fibrillation. She was referred to the [facility] for a diagnostic PSG given weight loss. The ramifications of HARIS and its potential therapeutic modalities were discussed with the patient. She will follow up with us after the PSG. WIll likely schedule follow up visit to have device brought in to resume therapy.\par

## 2022-01-26 NOTE — PHYSICAL EXAM
[General Appearance - Well Developed] : well developed [Normal Appearance] : normal appearance [Well Groomed] : well groomed [General Appearance - Well Nourished] : well nourished [No Deformities] : no deformities [General Appearance - In No Acute Distress] : no acute distress [Normal Conjunctiva] : the conjunctiva exhibited no abnormalities [III] : III [Neck Appearance] : the appearance of the neck was normal [Apical Impulse] : the apical impulse was normal [Heart Rate And Rhythm] : heart rate was normal and rhythm regular [Heart Sounds] : normal S1 and S2 [Heart Sounds Gallop] : no gallops [] : no respiratory distress [Respiration, Rhythm And Depth] : normal respiratory rhythm and effort [Exaggerated Use Of Accessory Muscles For Inspiration] : no accessory muscle use [Auscultation Breath Sounds / Voice Sounds] : lungs were clear to auscultation bilaterally [Involuntary Movements] : no involuntary movements were seen [Nail Clubbing] : no clubbing of the fingernails [Cyanosis, Localized] : no localized cyanosis [Petechial Hemorrhages (___cm)] : no petechial hemorrhages [Nail Splinter Hemorrhages] : no splinter hemorrhages of the nails [Skin Color & Pigmentation] : normal skin color and pigmentation [No Focal Deficits] : no focal deficits [Oriented To Time, Place, And Person] : oriented to person, place, and time [Impaired Insight] : insight and judgment were intact [Affect] : the affect was normal [Mood] : the mood was normal

## 2022-01-26 NOTE — CONSULT LETTER
[Dear  ___] : Dear  [unfilled], [Consult Letter:] : I had the pleasure of evaluating your patient, [unfilled]. [Please see my note below.] : Please see my note below. [Consult Closing:] : Thank you very much for allowing me to participate in the care of this patient.  If you have any questions, please do not hesitate to contact me. [Sincerely,] : Sincerely, [FreeTextEntry3] : Trinity Maldonado MD\par Pulmonary, Critical Care, and Sleep Medicine\par Associate Medical Director, [facility]\par  of Medicine\par [employer] [facility]

## 2022-01-26 NOTE — REASON FOR VISIT
[Consultation] : a consultation visit [Sleep Apnea] : sleep apnea [FreeTextEntry1] : Referred by Dr. Gayle

## 2022-02-03 RX ORDER — OXYBUTYNIN CHLORIDE 15 MG/1
15 TABLET, EXTENDED RELEASE ORAL
Qty: 90 | Refills: 0 | Status: ACTIVE | COMMUNITY
Start: 2021-07-14 | End: 1900-01-01

## 2022-02-07 ENCOUNTER — NON-APPOINTMENT (OUTPATIENT)
Age: 72
End: 2022-02-07

## 2022-02-07 ENCOUNTER — APPOINTMENT (OUTPATIENT)
Dept: ELECTROPHYSIOLOGY | Facility: CLINIC | Age: 72
End: 2022-02-07
Payer: MEDICARE

## 2022-02-07 VITALS
DIASTOLIC BLOOD PRESSURE: 83 MMHG | OXYGEN SATURATION: 96 % | BODY MASS INDEX: 38.23 KG/M2 | HEIGHT: 62 IN | SYSTOLIC BLOOD PRESSURE: 135 MMHG | HEART RATE: 96 BPM

## 2022-02-07 VITALS — WEIGHT: 209 LBS | BODY MASS INDEX: 38.23 KG/M2

## 2022-02-07 DIAGNOSIS — R06.00 DYSPNEA, UNSPECIFIED: ICD-10-CM

## 2022-02-07 PROCEDURE — 99205 OFFICE O/P NEW HI 60 MIN: CPT

## 2022-02-07 PROCEDURE — 93000 ELECTROCARDIOGRAM COMPLETE: CPT

## 2022-02-07 RX ORDER — LOSARTAN POTASSIUM 100 MG/1
TABLET, FILM COATED ORAL
Refills: 0 | Status: DISCONTINUED | COMMUNITY
End: 2022-02-07

## 2022-02-07 RX ORDER — LOSARTAN POTASSIUM AND HYDROCHLOROTHIAZIDE 50; 12.5 MG/1; MG/1
50-12.5 TABLET, FILM COATED ORAL DAILY
Refills: 0 | Status: DISCONTINUED | COMMUNITY
End: 2022-02-07

## 2022-02-07 RX ORDER — METOPROLOL TARTRATE 75 MG/1
TABLET, FILM COATED ORAL
Refills: 0 | Status: DISCONTINUED | COMMUNITY
End: 2022-02-07

## 2022-02-08 NOTE — DISCUSSION/SUMMARY
[FreeTextEntry1] : Impression:\par \par 1. Persistent afib: EKG performed today to assess for presence of recurrent afib and reveals afib. Review of Holter's reveal persistent afib with rates ranging from the 50s to 180s. ECHO with normal LVEF. Discussed treatment options for afib including rate control vs antiarrhythmics vs possible ablation. Given persistent symptomatic afib despite rate control management and preference to avoid antiarrhythmics, recommend undergoing possible afib ablation. Risks, benefits, and alternatives to procedure discussed at length. Risks including that of bleeding, infection, stroke, and cardiac tamponade discussed and he verbalizes understanding of all. Will hold all medications the morning of the ablation. May take all regularly scheduled medications the night before.\par \par 2. HTN: resume oral antihypertensives as prescribed. Encouraged heart healthy diet, sodium restriction, and weight loss. Continue regular f/u with Cardiologist for further HTN management.\par \par 3. HLD: resume statin therapy as prescribed and regular f/u with Cardiologist for routine lipid monitoring and management.\par \par Plan for afib ablation and RTO for f/u post procedure.

## 2022-02-08 NOTE — HISTORY OF PRESENT ILLNESS
[FreeTextEntry1] : Sumi Connolly is a 73y/o woman with Hx of HTN, HLD, HARIS, fibromyalgia, depression, GERD, and persistent afib (first diagnosed in 9/2020), on Eliquis, who presents today for initial evaluation. Admits a few years ago recalls feeling chest discomfort and was noted to be in afib. Also with dyspnea on exertion and fatigue when walking or climbing stairs, able to walk about 2 blocks or one flight of stairs. Underwent a Holter back in 1/2021 and revealed persistent afib as well as repeat monitor in 8/2021 which revealed persistent afib with rates in the 50s to the 180s. Does feel afib at times with symptoms of palpitations when her heart rate gets fast and dyspnea/fatigue. Denies chest pain, syncope or near syncope. No s/s of bleeding on Eliquis. Is a Jehovah Witness. \par

## 2022-02-08 NOTE — CARDIOLOGY SUMMARY
[de-identified] : 9/1/2021: normal LV size and function, mild ischemia, LVEF 65% [de-identified] : 9/23/2021: LV cavity size is normal. LVEF 51%.  [de-identified] : 11/1/2021: non obstructive

## 2022-02-25 ENCOUNTER — OUTPATIENT (OUTPATIENT)
Dept: OUTPATIENT SERVICES | Facility: HOSPITAL | Age: 72
LOS: 1 days | End: 2022-02-25

## 2022-02-25 VITALS
HEIGHT: 61 IN | DIASTOLIC BLOOD PRESSURE: 82 MMHG | SYSTOLIC BLOOD PRESSURE: 130 MMHG | TEMPERATURE: 98 F | HEART RATE: 70 BPM | RESPIRATION RATE: 18 BRPM | OXYGEN SATURATION: 97 % | WEIGHT: 205.03 LBS

## 2022-02-25 DIAGNOSIS — I48.19 OTHER PERSISTENT ATRIAL FIBRILLATION: ICD-10-CM

## 2022-02-25 DIAGNOSIS — I48.91 UNSPECIFIED ATRIAL FIBRILLATION: ICD-10-CM

## 2022-02-25 DIAGNOSIS — Z96.651 PRESENCE OF RIGHT ARTIFICIAL KNEE JOINT: Chronic | ICD-10-CM

## 2022-02-25 DIAGNOSIS — G47.30 SLEEP APNEA, UNSPECIFIED: ICD-10-CM

## 2022-02-25 LAB
ALBUMIN SERPL ELPH-MCNC: 3.9 G/DL — SIGNIFICANT CHANGE UP (ref 3.3–5)
ALP SERPL-CCNC: 95 U/L — SIGNIFICANT CHANGE UP (ref 40–120)
ALT FLD-CCNC: 17 U/L — SIGNIFICANT CHANGE UP (ref 4–33)
ANION GAP SERPL CALC-SCNC: 15 MMOL/L — HIGH (ref 7–14)
AST SERPL-CCNC: 27 U/L — SIGNIFICANT CHANGE UP (ref 4–32)
BILIRUB SERPL-MCNC: 0.3 MG/DL — SIGNIFICANT CHANGE UP (ref 0.2–1.2)
BLD GP AB SCN SERPL QL: NEGATIVE — SIGNIFICANT CHANGE UP
BUN SERPL-MCNC: 12 MG/DL — SIGNIFICANT CHANGE UP (ref 7–23)
CALCIUM SERPL-MCNC: 9.3 MG/DL — SIGNIFICANT CHANGE UP (ref 8.4–10.5)
CHLORIDE SERPL-SCNC: 103 MMOL/L — SIGNIFICANT CHANGE UP (ref 98–107)
CO2 SERPL-SCNC: 22 MMOL/L — SIGNIFICANT CHANGE UP (ref 22–31)
CREAT SERPL-MCNC: 0.69 MG/DL — SIGNIFICANT CHANGE UP (ref 0.5–1.3)
GLUCOSE SERPL-MCNC: 116 MG/DL — HIGH (ref 70–99)
HCT VFR BLD CALC: 42.8 % — SIGNIFICANT CHANGE UP (ref 34.5–45)
HGB BLD-MCNC: 14 G/DL — SIGNIFICANT CHANGE UP (ref 11.5–15.5)
MCHC RBC-ENTMCNC: 29.5 PG — SIGNIFICANT CHANGE UP (ref 27–34)
MCHC RBC-ENTMCNC: 32.7 GM/DL — SIGNIFICANT CHANGE UP (ref 32–36)
MCV RBC AUTO: 90.3 FL — SIGNIFICANT CHANGE UP (ref 80–100)
NRBC # BLD: 0 /100 WBCS — SIGNIFICANT CHANGE UP
NRBC # FLD: 0 K/UL — SIGNIFICANT CHANGE UP
PLATELET # BLD AUTO: 353 K/UL — SIGNIFICANT CHANGE UP (ref 150–400)
POTASSIUM SERPL-MCNC: 4.2 MMOL/L — SIGNIFICANT CHANGE UP (ref 3.5–5.3)
POTASSIUM SERPL-SCNC: 4.2 MMOL/L — SIGNIFICANT CHANGE UP (ref 3.5–5.3)
PROT SERPL-MCNC: 7.6 G/DL — SIGNIFICANT CHANGE UP (ref 6–8.3)
RBC # BLD: 4.74 M/UL — SIGNIFICANT CHANGE UP (ref 3.8–5.2)
RBC # FLD: 12.8 % — SIGNIFICANT CHANGE UP (ref 10.3–14.5)
RH IG SCN BLD-IMP: POSITIVE — SIGNIFICANT CHANGE UP
SODIUM SERPL-SCNC: 140 MMOL/L — SIGNIFICANT CHANGE UP (ref 135–145)
WBC # BLD: 8.57 K/UL — SIGNIFICANT CHANGE UP (ref 3.8–10.5)
WBC # FLD AUTO: 8.57 K/UL — SIGNIFICANT CHANGE UP (ref 3.8–10.5)

## 2022-02-25 RX ORDER — AMLODIPINE BESYLATE 2.5 MG/1
1 TABLET ORAL
Qty: 0 | Refills: 0 | DISCHARGE

## 2022-02-25 RX ORDER — PANTOPRAZOLE SODIUM 20 MG/1
1 TABLET, DELAYED RELEASE ORAL
Qty: 0 | Refills: 0 | DISCHARGE

## 2022-02-25 RX ORDER — METOPROLOL TARTRATE 50 MG
1 TABLET ORAL
Qty: 0 | Refills: 0 | DISCHARGE

## 2022-02-25 RX ORDER — APIXABAN 2.5 MG/1
1 TABLET, FILM COATED ORAL
Qty: 0 | Refills: 0 | DISCHARGE

## 2022-02-25 NOTE — H&P PST ADULT - CARDIOVASCULAR COMMENTS
has had palpitations with halter done in 2021; c/o SOB with ambulating 2 blocks 2nd RICS, 2nd LICS, 3rd LICS; 5th LICS/MCL

## 2022-02-25 NOTE — H&P PST ADULT - NSICDXFAMILYHX_GEN_ALL_CORE_FT
FAMILY HISTORY:  Father  Still living? No  Family hx of lung cancer, Age at diagnosis: Age Unknown    Sibling  Still living? Yes, Estimated age: Age Unknown  Family history of congenital heart disease, Age at diagnosis: Age Unknown  FH: breast cancer, Age at diagnosis: Age Unknown

## 2022-02-25 NOTE — H&P PST ADULT - PROBLEM SELECTOR PLAN 3
* Copy of  stress test, echo, angiogram, and recent ekg in chart  * Patient instructed to speak with surgeon regarding preop medications and when or if to stop Eliquis

## 2022-02-25 NOTE — H&P PST ADULT - NSICDXPASTMEDICALHX_GEN_ALL_CORE_FT
PAST MEDICAL HISTORY:  Anxiety and depression     Atrial fibrillation persistent in 2022    GERD (gastroesophageal reflux disease)     H/O constipation     H/O fibromyalgia     HTN (Hypertension)     Latex allergy     Migraines     Morbid obesity due to excess calories     Muscle Cramps at Night     HARIS (obstructive sleep apnea) noncompliant with CPAP    Overactive bladder     Patient is Anabaptist refuse blood products    Pericarditis around age 30's    Primary osteoarthritis of right knee

## 2022-02-25 NOTE — H&P PST ADULT - PROBLEM SELECTOR PLAN 1
This is a 73 y/o male who is scheduled for atrial fibrillation ablation with [employer] on 3-10-22  * Instructed to speak with surgeon regarding covid testing  * Given preop and cleanser instructions with good teach back and patient verbalized understanding

## 2022-02-25 NOTE — H&P PST ADULT - HISTORY OF PRESENT ILLNESS
This is a 73 y/o female who presents with h/o persistent atrial fibrillation. Symptomatic for palpitations (had halter in 1-2021) when her heart rate is fast and dyspnea/fatigue. Evaluated by MD who recommended intervention. Scheduled for atrial fibrillation ablation with [employer]

## 2022-03-10 ENCOUNTER — OUTPATIENT (OUTPATIENT)
Dept: OUTPATIENT SERVICES | Facility: HOSPITAL | Age: 72
LOS: 1 days | Discharge: HOME | End: 2022-03-10
Payer: MEDICARE

## 2022-03-10 DIAGNOSIS — Z96.651 PRESENCE OF RIGHT ARTIFICIAL KNEE JOINT: Chronic | ICD-10-CM

## 2022-03-10 DIAGNOSIS — I48.19 OTHER PERSISTENT ATRIAL FIBRILLATION: ICD-10-CM

## 2022-03-10 LAB — RH IG SCN BLD-IMP: POSITIVE — SIGNIFICANT CHANGE UP

## 2022-03-10 PROCEDURE — 93655 ICAR CATH ABLTJ DSCRT ARRHYT: CPT

## 2022-03-10 PROCEDURE — 93010 ELECTROCARDIOGRAM REPORT: CPT | Mod: 76

## 2022-03-10 PROCEDURE — 93656 COMPRE EP EVAL ABLTJ ATR FIB: CPT

## 2022-03-10 RX ORDER — ACETAMINOPHEN 500 MG
650 TABLET ORAL ONCE
Refills: 0 | Status: COMPLETED | OUTPATIENT
Start: 2022-03-10 | End: 2022-03-10

## 2022-03-10 RX ORDER — COLCHICINE 0.6 MG
1 TABLET ORAL
Qty: 28 | Refills: 0
Start: 2022-03-10 | End: 2022-03-23

## 2022-03-10 RX ORDER — SODIUM CHLORIDE 9 MG/ML
1000 INJECTION, SOLUTION INTRAVENOUS
Refills: 0 | Status: DISCONTINUED | OUTPATIENT
Start: 2022-03-10 | End: 2022-03-10

## 2022-03-10 RX ADMIN — Medication 650 MILLIGRAM(S): at 16:19

## 2022-03-10 NOTE — CHART NOTE - NSCHARTNOTEFT_GEN_A_CORE
Type of procedure: PVI  Licensed independent practitioner: Earnest Corley MD  Assistant: None  Description of procedure: After informed consent was obtained, the patient was brought to the Electrophysiology laboratory in the fasting state, and was prepped and draped in the usual sterile fashion. The patient was electively intubated by members of the anesthesia department, who provided sedation throughout the case. In addition an esophageal temperature probe was placed in the esophagus to allow dynamic temperate monitoring during ablation. The patient was under uninterrupted apixaban therapy.  Sheaths were placed as described in the procedure report, and catheters were advanced into the heart under fluoroscopic guidance without complications. Baseline intra-cardiac echocardiography (ICE) demonstrated the following: The LV size and functions were normal. There was no pericardial effusion at baseline  IV Heparin bolus was given followed by continuous drip to maintain the -400s throughout the case.  The left atrium was entered under fluoroscopic and ICE guidance by a single transseptal approach using a medium curve [place] sheath. There were no complications.  A PentaRay Ric F curve catheter and a 3.5 mm irrigated-tip [employer] ThermoCool D/F-curve ablation catheter were used for mapping and ablation. A 3D anatomy of the LA was performed using Carto 3 V7.  We first isolated the left pulmonary veins in an antral approach with demonstration of entrance block. No lalita lesions were required for isolation of the left PVs. We then similarly isolated the right pulmonary veins in an antral approach. Lesions were required in the septal lalita near the RIPV, as well as in the posterior lalita, near the RSPV, for isolation. Bilateral PVs showed evidence of entrance and exit block after pacing maneuvers. With pacing inside the PV, there was local PV capture without capture of the LA in the LSPV, LIPV and RSPV. Esophageal temperatures johnny from a baseline of 35.8 degrees Celsius to a peak of 36.8 degrees Celsius.  The pulmonary veins remained isolated for >30 minutes, without evidence of acute reconnection.   Next a posterior wall isolation was performed, CTI ablation with bidirectional block and microreentrant AT from the LA septum was performed.  Burst pacing was performed from the proximal CS bipole down to 200ms with induction of atrial fibrillation requiring cardioversion.  As such, heparin was stopped and the sheaths were pulled back to the right atrium. Repeat ICE imaging revealed no pericardial effusion.  All catheters were removed from the body and sheaths were removed and the RFV was closed with Vascade closure device.  A total of 80mg of protamine was given to reverse the Heparin effect.    The patient tolerated the procedure well and was successfully extubated and transferred to the [facility] for further monitoring. There were no complications.  During the procedure, a [employer] rep was present to manage a complex mapping system.      Findings of procedure: Successful isolation of the pulmonary veins in an antral circumferential approach, posterior wall isolation, CTI flutter ablation, and septal micro-reentrant AT ablation. No acute reconnections.  Estimated blood loss: <5cc  Specimen removed: N/A  Preoperative Dx: Afib  Postoperative Dx: Afib  Complications: None  Anesthesia type: General

## 2022-03-10 NOTE — CHART NOTE - NSCHARTNOTEFT_GEN_A_CORE
71 y/o F with PMH of HTN, HLD, HARIS(on CPAP QHS), Depression, GERD, persistent Afib(on Eliquis) presented to [facility] for Afib ablation. 73 y/o F with PMH of HTN, HLD, HARIS(on CPAP QHS), Depression, Fibromyalgia, GERD, persistent Afib(on Eliquis) presented to [facility] for Afib ablation. As per the patient she has been having palpitations with associated lightheadedness and SOB. Patient stated that after she had COVID she developed chest tightness and palpitations and had multiple HOLTER monitor which revealed atrial fibrillation. Explained about the procedure in detail to the patient and the  who is at bedside. All risks/benefits of the procedure explained and the patient understood and signed all the consents. Patient is a Latter-day and signed the blood refusal consent as well. Reviewed medication list with patient and updated on patient's chart. Reviewed pre-surgical testing H&P and All scripts note from Dr. Corley. Patient last took her Eliquis medication on March 6th in the evening.     COVID PCR not detected on 03/07/22

## 2022-03-10 NOTE — CHART NOTE - NSCHARTNOTEFT_GEN_A_CORE
This is a 72 year old woman with a PMH of HTN, HLD, HARIS(on CPAP), Depression, Fibromyalgia, GERD, persistent Afib(on Eliquis) who presented to [facility] for Afib ablation s/p PVI ablation on 3/10/2022    Physical Exam:  General: Alert, NAD, lying in bed    Plan:  Post-op PVI ablation instruction has been verbally explained and given to the patient. Patient expressed understanding and all questions were answered   Patient is schedule for an appointment on 4/14/2022 at 2:00pm  Remove the bandage after 24-48 hours  No scrubbing the incision site for 7 days   No lifting more than 5lb or exertional exercising such as jogging, running, bike riding for 7 days  No swimming pool, Jacuzzi, or bath for 5 days.   Patient can shower 24 hours after procedure . Pat the area dry  Pt was instructed to call [phone] if the following occurs:      - fever with temperature > 100.6      - swelling, drainage or bleeding at the site incision       - chest pain, SOB, n/v    Yahir Perea PA-C

## 2022-03-10 NOTE — CHART NOTE - NSCHARTNOTEFT_GEN_A_CORE
Notified by IRS RN that patient endorsed of mild midsternal chest heaviness or burning post procedure. Examined patient at bedside and she stated that the chest heaviness/burning is a 3/10. Asked if patient would like anything for the symptoms she did not want at the moment. EKG unremarkable post procedure. Spoke with Dr. Corley who recommended to discharge patient on Colchicine 0.6mg twice a day for 2 weeks. Dr. Corley also recommended that patient can restart her Eliquis medication tonight when she gets home. This was relayed to the patient who understand and is in agreement.

## 2022-03-23 ENCOUNTER — INPATIENT (INPATIENT)
Facility: HOSPITAL | Age: 72
LOS: 3 days | Discharge: HOME | End: 2022-03-27
Attending: HOSPITALIST | Admitting: HOSPITALIST
Payer: MEDICARE

## 2022-03-23 VITALS
TEMPERATURE: 98 F | DIASTOLIC BLOOD PRESSURE: 80 MMHG | RESPIRATION RATE: 18 BRPM | OXYGEN SATURATION: 98 % | HEIGHT: 61 IN | SYSTOLIC BLOOD PRESSURE: 150 MMHG | HEART RATE: 85 BPM

## 2022-03-23 DIAGNOSIS — Z96.651 PRESENCE OF RIGHT ARTIFICIAL KNEE JOINT: Chronic | ICD-10-CM

## 2022-03-23 DIAGNOSIS — R00.2 PALPITATIONS: ICD-10-CM

## 2022-03-23 PROBLEM — Z91.040 LATEX ALLERGY STATUS: Chronic | Status: ACTIVE | Noted: 2022-02-25

## 2022-03-23 PROBLEM — G43.909 MIGRAINE, UNSPECIFIED, NOT INTRACTABLE, WITHOUT STATUS MIGRAINOSUS: Chronic | Status: ACTIVE | Noted: 2022-02-25

## 2022-03-23 PROBLEM — K21.9 GASTRO-ESOPHAGEAL REFLUX DISEASE WITHOUT ESOPHAGITIS: Chronic | Status: ACTIVE | Noted: 2022-02-25

## 2022-03-23 PROBLEM — N32.81 OVERACTIVE BLADDER: Chronic | Status: ACTIVE | Noted: 2022-02-25

## 2022-03-23 PROBLEM — I48.91 UNSPECIFIED ATRIAL FIBRILLATION: Chronic | Status: ACTIVE | Noted: 2022-02-25

## 2022-03-23 PROBLEM — Z87.39 PERSONAL HISTORY OF OTHER DISEASES OF THE MUSCULOSKELETAL SYSTEM AND CONNECTIVE TISSUE: Chronic | Status: ACTIVE | Noted: 2022-02-25

## 2022-03-23 PROBLEM — F41.9 ANXIETY DISORDER, UNSPECIFIED: Chronic | Status: ACTIVE | Noted: 2022-02-25

## 2022-03-23 PROBLEM — Z87.19 PERSONAL HISTORY OF OTHER DISEASES OF THE DIGESTIVE SYSTEM: Chronic | Status: ACTIVE | Noted: 2022-02-25

## 2022-03-23 LAB
ALBUMIN SERPL ELPH-MCNC: 4 G/DL — SIGNIFICANT CHANGE UP (ref 3.3–5)
ALP SERPL-CCNC: 95 U/L — SIGNIFICANT CHANGE UP (ref 40–120)
ALT FLD-CCNC: 18 U/L — SIGNIFICANT CHANGE UP (ref 4–33)
ANION GAP SERPL CALC-SCNC: 13 MMOL/L — SIGNIFICANT CHANGE UP (ref 7–14)
AST SERPL-CCNC: 23 U/L — SIGNIFICANT CHANGE UP (ref 4–32)
BASOPHILS # BLD AUTO: 0.06 K/UL — SIGNIFICANT CHANGE UP (ref 0–0.2)
BASOPHILS NFR BLD AUTO: 0.7 % — SIGNIFICANT CHANGE UP (ref 0–2)
BILIRUB SERPL-MCNC: 0.4 MG/DL — SIGNIFICANT CHANGE UP (ref 0.2–1.2)
BUN SERPL-MCNC: 17 MG/DL — SIGNIFICANT CHANGE UP (ref 7–23)
CALCIUM SERPL-MCNC: 9.4 MG/DL — SIGNIFICANT CHANGE UP (ref 8.4–10.5)
CHLORIDE SERPL-SCNC: 104 MMOL/L — SIGNIFICANT CHANGE UP (ref 98–107)
CO2 SERPL-SCNC: 24 MMOL/L — SIGNIFICANT CHANGE UP (ref 22–31)
CREAT SERPL-MCNC: 0.66 MG/DL — SIGNIFICANT CHANGE UP (ref 0.5–1.3)
D DIMER BLD IA.RAPID-MCNC: 262 NG/ML DDU — HIGH
EGFR: 93 ML/MIN/1.73M2 — SIGNIFICANT CHANGE UP
EOSINOPHIL # BLD AUTO: 0.23 K/UL — SIGNIFICANT CHANGE UP (ref 0–0.5)
EOSINOPHIL NFR BLD AUTO: 2.6 % — SIGNIFICANT CHANGE UP (ref 0–6)
FLUAV AG NPH QL: SIGNIFICANT CHANGE UP
FLUBV AG NPH QL: SIGNIFICANT CHANGE UP
GLUCOSE SERPL-MCNC: 99 MG/DL — SIGNIFICANT CHANGE UP (ref 70–99)
HCT VFR BLD CALC: 40.8 % — SIGNIFICANT CHANGE UP (ref 34.5–45)
HGB BLD-MCNC: 13.7 G/DL — SIGNIFICANT CHANGE UP (ref 11.5–15.5)
IANC: 5.29 K/UL — SIGNIFICANT CHANGE UP (ref 1.8–7.4)
IMM GRANULOCYTES NFR BLD AUTO: 0.3 % — SIGNIFICANT CHANGE UP (ref 0–1.5)
LYMPHOCYTES # BLD AUTO: 2.58 K/UL — SIGNIFICANT CHANGE UP (ref 1–3.3)
LYMPHOCYTES # BLD AUTO: 28.6 % — SIGNIFICANT CHANGE UP (ref 13–44)
MCHC RBC-ENTMCNC: 29.4 PG — SIGNIFICANT CHANGE UP (ref 27–34)
MCHC RBC-ENTMCNC: 33.6 GM/DL — SIGNIFICANT CHANGE UP (ref 32–36)
MCV RBC AUTO: 87.6 FL — SIGNIFICANT CHANGE UP (ref 80–100)
MONOCYTES # BLD AUTO: 0.82 K/UL — SIGNIFICANT CHANGE UP (ref 0–0.9)
MONOCYTES NFR BLD AUTO: 9.1 % — SIGNIFICANT CHANGE UP (ref 2–14)
NEUTROPHILS # BLD AUTO: 5.29 K/UL — SIGNIFICANT CHANGE UP (ref 1.8–7.4)
NEUTROPHILS NFR BLD AUTO: 58.7 % — SIGNIFICANT CHANGE UP (ref 43–77)
NRBC # BLD: 0 /100 WBCS — SIGNIFICANT CHANGE UP
NRBC # FLD: 0 K/UL — SIGNIFICANT CHANGE UP
PLATELET # BLD AUTO: 291 K/UL — SIGNIFICANT CHANGE UP (ref 150–400)
POTASSIUM SERPL-MCNC: 4 MMOL/L — SIGNIFICANT CHANGE UP (ref 3.5–5.3)
POTASSIUM SERPL-SCNC: 4 MMOL/L — SIGNIFICANT CHANGE UP (ref 3.5–5.3)
PROT SERPL-MCNC: 7.6 G/DL — SIGNIFICANT CHANGE UP (ref 6–8.3)
RBC # BLD: 4.66 M/UL — SIGNIFICANT CHANGE UP (ref 3.8–5.2)
RBC # FLD: 12.8 % — SIGNIFICANT CHANGE UP (ref 10.3–14.5)
RSV RNA NPH QL NAA+NON-PROBE: SIGNIFICANT CHANGE UP
SARS-COV-2 RNA SPEC QL NAA+PROBE: SIGNIFICANT CHANGE UP
SODIUM SERPL-SCNC: 141 MMOL/L — SIGNIFICANT CHANGE UP (ref 135–145)
TROPONIN T, HIGH SENSITIVITY RESULT: 19 NG/L — SIGNIFICANT CHANGE UP
TROPONIN T, HIGH SENSITIVITY RESULT: 19 NG/L — SIGNIFICANT CHANGE UP
WBC # BLD: 9.01 K/UL — SIGNIFICANT CHANGE UP (ref 3.8–10.5)
WBC # FLD AUTO: 9.01 K/UL — SIGNIFICANT CHANGE UP (ref 3.8–10.5)

## 2022-03-23 PROCEDURE — 71275 CT ANGIOGRAPHY CHEST: CPT | Mod: 26,MA

## 2022-03-23 PROCEDURE — 93010 ELECTROCARDIOGRAM REPORT: CPT

## 2022-03-23 PROCEDURE — 99285 EMERGENCY DEPT VISIT HI MDM: CPT | Mod: 25

## 2022-03-23 PROCEDURE — 93306 TTE W/DOPPLER COMPLETE: CPT | Mod: 26

## 2022-03-23 PROCEDURE — 71046 X-RAY EXAM CHEST 2 VIEWS: CPT | Mod: 26

## 2022-03-23 PROCEDURE — 99222 1ST HOSP IP/OBS MODERATE 55: CPT

## 2022-03-23 PROCEDURE — 74174 CTA ABD&PLVS W/CONTRAST: CPT | Mod: 26,MA

## 2022-03-23 NOTE — ED ADULT TRIAGE NOTE - HISTORY OF COVID-19 VACCINATION
Detail Level: Simple
Additional Notes: Patient consent was obtained to proceed with the visit and recommended plan of care after discussion of all risks and benefits, including the risks of COVID-19 exposure.
Yes

## 2022-03-23 NOTE — ED ADULT NURSE NOTE - OBJECTIVE STATEMENT
72 year old female received to rm 7 AAOx4 ambulatory. Pt c/o episode of sharp midsternal chest pain radiating to left arm, shortness of breath, nausea and dizziness while walking within PMD office today. Pt endorses having chest "squeezing" and orthopnea x 5 days. Pt had ablation 3/10/22 for afib (on eliquis). 72 year old female received to rm 7 AAOx4 ambulatory. Pt c/o episode of sharp midsternal chest pain radiating to left arm, shortness of breath, nausea and dizziness while walking within PMD office today. Pt endorses having chest "squeezing" and orthopnea x 5 days. Pt had ablation 3/10/22 for afib (on eliquis). Pt BIBEMS presents with 20G PIC to right hand. Pt given ASA 324mg, NTG x2, 200cc NS by EMS. Pt endorses partial relief of chest pain/sob. Respirations even and unlabored sating 100% room air. NSR on cardiac monitor, 60s. Skin intact. Pt awaiting MD ziegler. Bed in lowest position, bell within reach

## 2022-03-23 NOTE — ED PROVIDER NOTE - PHYSICAL EXAMINATION
CONSTITUTIONAL: Well-appearing; well-nourished; obese, in no apparent distress. Non-toxic appearing.   NEURO: Alert & oriented. Sensory and motor functions are grossly intact.  PSYCH: Mood appropriate. Thought processes intact.   NECK: Supple.  CARD: Regular rate and rhythm, no murmurs. no reproducible chest pain.   RESP: No accessory muscle use; breath sounds clear and equal bilaterally; no wheezes, rhonchi, or rales     ABD: Soft; non-distended; non-tender. No guarding or rebound.   MUSCULOSKELETAL/EXTREMITIES: FROM in all four extremities; no extremity edema.  SKIN: Warm; dry; no apparent lesions or exudate

## 2022-03-23 NOTE — ED PROVIDER NOTE - ATTENDING CONTRIBUTION TO CARE
DR. SIMS, ATTENDING MD-  I personally saw the patient with the PA and performed a substantive portion of the visit including all aspects of the medical decision making.    71 y/o female bibems sent by pcp with cp sob this am.  S/p ntg and asa en route.  Eval for acs.  Obtain cbc cmp trop ekg cxr admit for further care and evaluation.

## 2022-03-23 NOTE — ED PROVIDER NOTE - NS ED ATTENDING STATEMENT MOD
This was a shared visit with the EDIE. I reviewed and verified the documentation and independently performed the documented:

## 2022-03-23 NOTE — ED PROVIDER NOTE - NSICDXPASTMEDICALHX_GEN_ALL_CORE_FT
PAST MEDICAL HISTORY:  Anxiety and depression     Atrial fibrillation persistent in 2022    GERD (gastroesophageal reflux disease)     H/O constipation     H/O fibromyalgia     HTN (Hypertension)     Latex allergy     Migraines     Morbid obesity due to excess calories     Muscle Cramps at Night     HARIS (obstructive sleep apnea) noncompliant with CPAP    Overactive bladder     Patient is Religious refuse blood products    Pericarditis around age 30's    Primary osteoarthritis of right knee

## 2022-03-23 NOTE — ED PROVIDER NOTE - OBJECTIVE STATEMENT
71 y/o female with hx of morbid obesity, HARIS on CPAP, HTN, fibromyalgia, and Afib s/p ablation on 3/10/22 presents c/o worsening CP x 1 week that became worse today. Pt states about 6 days following the procedure she began with CP but it continually became worse until today when the pain became pressure-like, radiating from her left shoulder/back region through to her chest. She notes prior to today it was a dull ache. Associated with FUENTES. Pt notes sitting upright somewhat relieves the pain. Denies cough, fever, leg swelling, abd pain, NVD.

## 2022-03-23 NOTE — ED ADULT TRIAGE NOTE - CHIEF COMPLAINT QUOTE
p/t c/o of chest discomfort , with mild sob since this am , sent by PMD for eval, NTG 2subl, and 81mg asax2 given by EMS

## 2022-03-23 NOTE — ED PROVIDER NOTE - CLINICAL SUMMARY MEDICAL DECISION MAKING FREE TEXT BOX
73 y/o female with hx of morbid obesity, HARIS on CPAP, HTN, fibromyalgia, and Afib s/p ablation on 3/10/22 presents c/o worsening CP x 1 week that became worse today. High risk ACS, will admit for further eval by cards and EP. 73 y/o female with hx of morbid obesity, HARIS on CPAP, HTN, fibromyalgia, and Afib s/p ablation on 3/10/22 presents c/o worsening CP x 1 week that became worse today. High risk ACS, will consult cards and EP for further care.

## 2022-03-23 NOTE — CONSULT NOTE ADULT - ASSESSMENT
This is a 72 year old woman with a pmhx of HTN, HLD, HARIS (on CPAP QHS), pericarditis (aged 26 ad 29), migraine depression, Fibromyalgia, GERD, COVID-19, and symptomatic persistent Afib (on Eliquis and metoprolol) s/p recent Afib ablation on 3/10/2022 and discharge on colchicine who presented today with worsening CP that radiated down the left arm and back. Patient rated the pain as a 9/10 which lasted for 2-3 hours. It was associated with dizziness, SOB, nausea, and chills.     Plan:  - Continuous telemetric monitoring  - Monitor electrolytes and replete K to 4 and Mg to 2  - Obtain TTE stat  - Obtain CT with oral and IV contrast of the chest and abdomin   - Continue Eliquis 5 mg po BID for thromboembolic ppx  given that patient has a [id number] score of 3 (age, sex, HTN)  - Continue pantoprazole 40mg po qd  - Continue home metoprolol 50mg po qd  - Continue Colchicine 0.6mg BID for another 2 weeks    Yahir Perea PA-C  Patient to be staffed with attending. Please await attending addendum

## 2022-03-23 NOTE — ED PROVIDER NOTE - NS ED ROS FT
ROS:  GENERAL: No fever, no chills  EYES: no change in vision  HEENT: no trouble swallowing, no trouble speaking  CARDIAC: as per HPI  PULMONARY: as per HPI  GI: no abdominal pain, no nausea, no vomiting, no diarrhea, no constipation  SKIN: no rashes  NEURO: no headache, no weakness  MSK: No joint pain   All other systems reviewed as negative.

## 2022-03-23 NOTE — ED PROVIDER NOTE - PROGRESS NOTE DETAILS
CK Meyers- cardiology res paged, advised of case will be in to see patient. Danilo Livingston D.O., PGY3 (Resident)  PAtient was signed out to me. EKG sinus sharon, Trops flat. EP and cards eval. Rec TTE and monitor on tele. CTAs neg. Discussed with patient re: admission. Amenable. Stable for admission. Discussed with hospitalist. Admit.

## 2022-03-23 NOTE — CONSULT NOTE ADULT - ASSESSMENT
72 year old woman with a pmhx of HTN, HLD, HARIS (on CPAP qHS), pericarditis, migraine, depression, fibromyalgia, GERD, COVID-19, and symptomatic persistent Afib (on Eliquis and metoprolol) s/p recent Afib ablation on 3/10/2022 who presented today with worsening CP. Cardiology consulted for ACS evaluation    # ACS evaluation  - patient with atypical chest pain. Pain aggravated with pressure on chest and back though feels different from initial sharp pain patient had.  - Trop negative 19.  Can repeat trop and trend. If next one is negative, no need to trend anymore  - EKG in chart is from outside facility. No ECG from ED in the chart. No ischemic changes noted.  - obtain TTE given patient's recent ablation procedure.  - no need to treat for ACS at this moment.  - monitor on tele.  - EP team notified for patient's visit.    Sonya Astorga MD  Cardiology Fellow - PGY 4  Text or Call: [phone]  For all New Consults and Questions:  [email or web address]   Login: [email or web address]

## 2022-03-24 DIAGNOSIS — R07.9 CHEST PAIN, UNSPECIFIED: ICD-10-CM

## 2022-03-24 DIAGNOSIS — I48.91 UNSPECIFIED ATRIAL FIBRILLATION: ICD-10-CM

## 2022-03-24 DIAGNOSIS — F32.89 OTHER SPECIFIED DEPRESSIVE EPISODES: ICD-10-CM

## 2022-03-24 DIAGNOSIS — Z86.79 PERSONAL HISTORY OF OTHER DISEASES OF THE CIRCULATORY SYSTEM: ICD-10-CM

## 2022-03-24 DIAGNOSIS — G47.33 OBSTRUCTIVE SLEEP APNEA (ADULT) (PEDIATRIC): ICD-10-CM

## 2022-03-24 DIAGNOSIS — K21.9 GASTRO-ESOPHAGEAL REFLUX DISEASE WITHOUT ESOPHAGITIS: ICD-10-CM

## 2022-03-24 DIAGNOSIS — Z98.890 OTHER SPECIFIED POSTPROCEDURAL STATES: Chronic | ICD-10-CM

## 2022-03-24 DIAGNOSIS — G43.909 MIGRAINE, UNSPECIFIED, NOT INTRACTABLE, WITHOUT STATUS MIGRAINOSUS: ICD-10-CM

## 2022-03-24 DIAGNOSIS — Z29.9 ENCOUNTER FOR PROPHYLACTIC MEASURES, UNSPECIFIED: ICD-10-CM

## 2022-03-24 DIAGNOSIS — I10 ESSENTIAL (PRIMARY) HYPERTENSION: ICD-10-CM

## 2022-03-24 DIAGNOSIS — R60.0 LOCALIZED EDEMA: ICD-10-CM

## 2022-03-24 DIAGNOSIS — E78.5 HYPERLIPIDEMIA, UNSPECIFIED: ICD-10-CM

## 2022-03-24 DIAGNOSIS — I48.20 CHRONIC ATRIAL FIBRILLATION, UNSPECIFIED: ICD-10-CM

## 2022-03-24 DIAGNOSIS — R13.10 DYSPHAGIA, UNSPECIFIED: ICD-10-CM

## 2022-03-24 LAB
24R-OH-CALCIDIOL SERPL-MCNC: 36.6 NG/ML — SIGNIFICANT CHANGE UP (ref 30–80)
A1C WITH ESTIMATED AVERAGE GLUCOSE RESULT: 6.4 % — HIGH (ref 4–5.6)
ESTIMATED AVERAGE GLUCOSE: 137 — SIGNIFICANT CHANGE UP

## 2022-03-24 PROCEDURE — 93010 ELECTROCARDIOGRAM REPORT: CPT

## 2022-03-24 PROCEDURE — 99221 1ST HOSP IP/OBS SF/LOW 40: CPT | Mod: GC

## 2022-03-24 PROCEDURE — 99232 SBSQ HOSP IP/OBS MODERATE 35: CPT

## 2022-03-24 PROCEDURE — 99222 1ST HOSP IP/OBS MODERATE 55: CPT | Mod: GC

## 2022-03-24 PROCEDURE — 99223 1ST HOSP IP/OBS HIGH 75: CPT

## 2022-03-24 PROCEDURE — 12345: CPT | Mod: NC

## 2022-03-24 RX ORDER — PANTOPRAZOLE SODIUM 20 MG/1
40 TABLET, DELAYED RELEASE ORAL EVERY 12 HOURS
Refills: 0 | Status: DISCONTINUED | OUTPATIENT
Start: 2022-03-24 | End: 2022-03-27

## 2022-03-24 RX ORDER — ATORVASTATIN CALCIUM 80 MG/1
20 TABLET, FILM COATED ORAL AT BEDTIME
Refills: 0 | Status: DISCONTINUED | OUTPATIENT
Start: 2022-03-24 | End: 2022-03-27

## 2022-03-24 RX ORDER — METOPROLOL TARTRATE 50 MG
50 TABLET ORAL DAILY
Refills: 0 | Status: DISCONTINUED | OUTPATIENT
Start: 2022-03-24 | End: 2022-03-24

## 2022-03-24 RX ORDER — SUCRALFATE 1 G
1 TABLET ORAL EVERY 6 HOURS
Refills: 0 | Status: DISCONTINUED | OUTPATIENT
Start: 2022-03-24 | End: 2022-03-27

## 2022-03-24 RX ORDER — SODIUM CHLORIDE 9 MG/ML
3 INJECTION INTRAMUSCULAR; INTRAVENOUS; SUBCUTANEOUS EVERY 8 HOURS
Refills: 0 | Status: DISCONTINUED | OUTPATIENT
Start: 2022-03-24 | End: 2022-03-27

## 2022-03-24 RX ORDER — LOSARTAN POTASSIUM 100 MG/1
100 TABLET, FILM COATED ORAL DAILY
Refills: 0 | Status: DISCONTINUED | OUTPATIENT
Start: 2022-03-24 | End: 2022-03-27

## 2022-03-24 RX ORDER — ACETAMINOPHEN 500 MG
650 TABLET ORAL EVERY 6 HOURS
Refills: 0 | Status: DISCONTINUED | OUTPATIENT
Start: 2022-03-24 | End: 2022-03-27

## 2022-03-24 RX ORDER — COLCHICINE 0.6 MG
0.6 TABLET ORAL EVERY 12 HOURS
Refills: 0 | Status: DISCONTINUED | OUTPATIENT
Start: 2022-03-24 | End: 2022-03-27

## 2022-03-24 RX ORDER — DILTIAZEM HCL 120 MG
360 CAPSULE, EXT RELEASE 24 HR ORAL DAILY
Refills: 0 | Status: DISCONTINUED | OUTPATIENT
Start: 2022-03-24 | End: 2022-03-27

## 2022-03-24 RX ORDER — PANTOPRAZOLE SODIUM 20 MG/1
40 TABLET, DELAYED RELEASE ORAL
Refills: 0 | Status: DISCONTINUED | OUTPATIENT
Start: 2022-03-24 | End: 2022-03-24

## 2022-03-24 RX ORDER — DILTIAZEM HCL 120 MG
1 CAPSULE, EXT RELEASE 24 HR ORAL
Qty: 0 | Refills: 0 | DISCHARGE

## 2022-03-24 RX ORDER — HYDROCHLOROTHIAZIDE 25 MG
12.5 TABLET ORAL DAILY
Refills: 0 | Status: DISCONTINUED | OUTPATIENT
Start: 2022-03-24 | End: 2022-03-27

## 2022-03-24 RX ORDER — APIXABAN 2.5 MG/1
5 TABLET, FILM COATED ORAL EVERY 12 HOURS
Refills: 0 | Status: DISCONTINUED | OUTPATIENT
Start: 2022-03-24 | End: 2022-03-27

## 2022-03-24 RX ORDER — OXYBUTYNIN CHLORIDE 5 MG
10 TABLET ORAL DAILY
Refills: 0 | Status: DISCONTINUED | OUTPATIENT
Start: 2022-03-24 | End: 2022-03-27

## 2022-03-24 RX ORDER — OXYBUTYNIN CHLORIDE 5 MG
1 TABLET ORAL
Qty: 0 | Refills: 0 | DISCHARGE

## 2022-03-24 RX ORDER — INFLUENZA VIRUS VACCINE 15; 15; 15; 15 UG/.5ML; UG/.5ML; UG/.5ML; UG/.5ML
0.7 SUSPENSION INTRAMUSCULAR ONCE
Refills: 0 | Status: DISCONTINUED | OUTPATIENT
Start: 2022-03-24 | End: 2022-03-27

## 2022-03-24 RX ORDER — OXYBUTYNIN CHLORIDE 5 MG
10 TABLET ORAL DAILY
Refills: 0 | Status: DISCONTINUED | OUTPATIENT
Start: 2022-03-24 | End: 2022-03-24

## 2022-03-24 RX ORDER — SENNA PLUS 8.6 MG/1
2 TABLET ORAL AT BEDTIME
Refills: 0 | Status: DISCONTINUED | OUTPATIENT
Start: 2022-03-24 | End: 2022-03-27

## 2022-03-24 RX ADMIN — ATORVASTATIN CALCIUM 20 MILLIGRAM(S): 80 TABLET, FILM COATED ORAL at 21:13

## 2022-03-24 RX ADMIN — APIXABAN 5 MILLIGRAM(S): 2.5 TABLET, FILM COATED ORAL at 05:44

## 2022-03-24 RX ADMIN — SODIUM CHLORIDE 3 MILLILITER(S): 9 INJECTION INTRAMUSCULAR; INTRAVENOUS; SUBCUTANEOUS at 21:07

## 2022-03-24 RX ADMIN — Medication 0.6 MILLIGRAM(S): at 05:44

## 2022-03-24 RX ADMIN — SODIUM CHLORIDE 3 MILLILITER(S): 9 INJECTION INTRAMUSCULAR; INTRAVENOUS; SUBCUTANEOUS at 13:42

## 2022-03-24 RX ADMIN — SODIUM CHLORIDE 3 MILLILITER(S): 9 INJECTION INTRAMUSCULAR; INTRAVENOUS; SUBCUTANEOUS at 05:54

## 2022-03-24 RX ADMIN — Medication 0.6 MILLIGRAM(S): at 17:24

## 2022-03-24 RX ADMIN — LOSARTAN POTASSIUM 100 MILLIGRAM(S): 100 TABLET, FILM COATED ORAL at 05:44

## 2022-03-24 RX ADMIN — Medication 360 MILLIGRAM(S): at 05:44

## 2022-03-24 RX ADMIN — PANTOPRAZOLE SODIUM 40 MILLIGRAM(S): 20 TABLET, DELAYED RELEASE ORAL at 17:28

## 2022-03-24 RX ADMIN — Medication 12.5 MILLIGRAM(S): at 05:44

## 2022-03-24 RX ADMIN — Medication 10 MILLIGRAM(S): at 13:05

## 2022-03-24 RX ADMIN — Medication 1 GRAM(S): at 17:19

## 2022-03-24 RX ADMIN — PANTOPRAZOLE SODIUM 40 MILLIGRAM(S): 20 TABLET, DELAYED RELEASE ORAL at 05:43

## 2022-03-24 RX ADMIN — SENNA PLUS 2 TABLET(S): 8.6 TABLET ORAL at 21:12

## 2022-03-24 RX ADMIN — APIXABAN 5 MILLIGRAM(S): 2.5 TABLET, FILM COATED ORAL at 17:24

## 2022-03-24 NOTE — H&P ADULT - NSICDXPASTMEDICALHX_GEN_ALL_CORE_FT
PAST MEDICAL HISTORY:  Anxiety and depression     Atrial fibrillation persistent in 2022    GERD (gastroesophageal reflux disease)     H/O constipation     H/O fibromyalgia     HTN (Hypertension)     Latex allergy     Migraines     Morbid obesity due to excess calories     Muscle Cramps at Night     HARIS (obstructive sleep apnea) noncompliant with CPAP    Overactive bladder     Patient is Mandaen refuse blood products    Pericarditis around age 30's    Primary osteoarthritis of right knee

## 2022-03-24 NOTE — CONSULT NOTE ADULT - ASSESSMENT
Impression:  71 yo F w/ PMHx HTN, HLD, HARIS, GERD, afib w/ recent ablation (3/10/22) presenting w/ chest pain since procedure. GI consulted for odynophagia since the procedure    # chest pain - being evaluated by cardiology, troponins negative, CTA negative, awaiting TTE  # odynophagia - developed post procedure; given proximity of LA to thoracic esophagus, thermal injury to the esophagus is possible. Will evaluate w/ EGD once patient cleared from cardiology. In the interim will treat empirically w/ sucralfate and PPI. DDx includes esophagitis, stricture    Recommendations: Impression:  71 yo F w/ PMHx HTN, HLD, HARIS, GERD, afib w/ recent ablation (3/10/22) presenting w/ chest pain since procedure. GI consulted for odynophagia since the procedure    # chest pain - being evaluated by cardiology, troponins negative, CTA negative, awaiting TTE  # odynophagia - developed post procedure; given proximity of LA to thoracic esophagus, thermal injury to the esophagus is possible, with the development of ulceration and eventual stricture. DDx includes pill induced esophagitis, infectious etiologies (i.e. HSV, CMV), though less likely. Caustic injury is possible as well.. Will evaluate w/ EGD once patient cleared from cardiology. In the interim will treat empirically w/ sucralfate and PPI    Recommendations:  - f/u cardiology evaluation for chest pain, awaiting TTE  - sucralfate suspension 1g q6 hours  - pantoprazole 40mg PO BID  - liquid diet  - NPO at midnight for possible EGD tomorrow if patient is cleared from cardiology  - rest of care per primary team    GI will continue to follow.     All recommendations are tentative until note is attested by attending.     Godfrey Foster, PGY5  Gastroenterology/Hepatology Fellow  Available on [employer] Teams  [phone] ([employer] Short Range Pager)  [phone] (Long Range Pager)    After 5pm, please contact the on-call GI fellow. [phone]

## 2022-03-24 NOTE — PHYSICAL THERAPY INITIAL EVALUATION ADULT - PERTINENT HX OF CURRENT PROBLEM, REHAB EVAL
Pt is a 72 year old female presenting with chest pain; pt is s/p ablation on 03/10 and was doing well but then started to have a severe pain radiating from her back to her chest. Also reports worsening odynophagia to solids and liquids. Pt admitted for chest pain workup and odynophagia

## 2022-03-24 NOTE — H&P ADULT - PROBLEM SELECTOR PLAN 9
Continue PPI Rate control with BB and CCB.  CHADSVASC= 3  Continue Eliquis. - Not on any SSRI or antidepressant meds.  - F/U VitD25 level, TSH.

## 2022-03-24 NOTE — H&P ADULT - PROBLEM SELECTOR PLAN 8
Rate control with BB and CCB.  CHADSVASC= 3  Continue Eliquis. Tylenol po PRN - Continue Colchicine po BID x 14 more days as per cardiology

## 2022-03-24 NOTE — CONSULT NOTE ADULT - NS ATTEND AMEND GEN_ALL_CORE FT
73 y/o female with history of atrial fibrillation on anticoagulation who is followed by Dr. Garcia of [facility] in clinic for easy bruising/excessive bleeding presenting for atypical chest pain. Troponin is negative. Previous workup included normal PT/PTT and negative von Willebrand factors. She has since been tolerating apixaban without bleeding.    - Continue close monitoring for bleeding.  - Continue anticoagulation.  - Further workup if warranted as outpatient.
This is a 72 year old woman with a pmhx of HTN, HLD, HARIS (on CPAP QHS), pericarditis (aged 26 ad 29), migraine depression, Fibromyalgia, GERD, COVID-19, and symptomatic persistent Afib (on Eliquis and metoprolol) s/p recent Afib ablation on 3/10/2022 and discharge on colchicine who presented with worsening CP that radiated down the left arm and back. Patient rated the pain as a 9/10 which lasted for 2-3 hours. It was associated with dizziness, SOB, nausea, and chills. Obtain TTE stat;   - Obtain CT with oral and IV contrast of the chest and abdomin   - Continue Eliquis 5 mg po BID for thromboembolic ppx  given that patient has a [id number] score of 3 (age, sex, HTN)  - Continue pantoprazole 40mg po qd  - Continue home metoprolol 50mg po qd  - Continue Colchicine 0.6mg BID for another 2 weeks

## 2022-03-24 NOTE — CHART NOTE - NSCHARTNOTEFT_GEN_A_CORE
per pt she is allergic to metoprolol - symptoms - hives, nausea, palpitations and SOB.
Patient states they have had allergic reaction when taking Metoprolol when it was prescribed a few years ago and became SOB. Medication d/c and added to allergy list. Please clarify medications w/ cards and PCP. Cards following, recs appreciated.

## 2022-03-24 NOTE — H&P ADULT - NSHPSOCIALHISTORY_GEN_ALL_CORE
.  Lives with the spouse.  Denies history of Nicotine and Illicit drug use,   ETOH < 1 x a month.   Retired teacher   Mammogram 2021: Normal  Colonoscopy 2020 : Normal.   Flu Vax: 2021  PNA Vax< 5 years   COVID vax X 3

## 2022-03-24 NOTE — PROGRESS NOTE ADULT - ASSESSMENT
72 year old woman with a pmhx of HTN, HLD, HARIS (on CPAP qHS), pericarditis, migraine, depression, fibromyalgia, GERD, COVID-19, and symptomatic persistent Afib (on Eliquis and metoprolol) s/p recent Afib ablation on 3/10/2022 who presented today with worsening CP. Cardiology consulted for ACS evaluation    # ACS evaluation  - patient with atypical chest pain. Pain aggravated with pressure on chest and back though feels different from initial sharp pain patient had.   - Trop negative (19-19)  - EKG in chart without ischemic changes noted.  - Given above findings, unlikely coronary disease.  - awaiting for TTE.  - monitor on tele.  - EP team notified for patient's visit.    If TTE is normal, no need for further cardiac work up needed before GI procedure.      Sonya Astorga MD  Cardiology Fellow - PGY 4  Text or Call: [phone]  For all New Consults and Questions:  [email or web address]   Login: [email or web address] 72 year old woman with a pmhx of HTN, HLD, HARIS (on CPAP qHS), pericarditis, migraine, depression, fibromyalgia, GERD, COVID-19, and symptomatic persistent Afib (on Eliquis and metoprolol) s/p recent Afib ablation on 3/10/2022 who presented today with worsening CP. Cardiology consulted for ACS evaluation    # ACS evaluation  - patient with atypical chest pain. Pain aggravated with pressure on chest and back though feels different from initial sharp pain patient had.   - Trop negative (19-19)  - EKG in chart without ischemic changes noted.  - Given above findings, unlikely coronary disease.  - awaiting for TTE.  - monitor on tele.  - EP team notified for patient's visit.    # medical optimization for cardiac perspective.  - no active anginal chest pain. Trop negative. EKG without ischemic changes.   - fair METs at baseline (4)  - RCRI score 0  - These risk estimates should be incorporated into a shared decision making conversation between the patient or their surrogate and the performing practitioner regarding the risk, benefits, and alternatives to the procedure and whether to proceed. Additional cardiac testing is unlikely to change the risk assessment or procedural outcomes from a cardiac perspective.    Sonya Astorga MD  Cardiology Fellow - PGY 4  Text or Call: [phone]  For all New Consults and Questions:  [email or web address]   Login: [email or web address] 72 year old woman with a pmhx of HTN, HLD, HARIS (on CPAP qHS), pericarditis, migraine, depression, fibromyalgia, GERD, COVID-19, and symptomatic persistent Afib (on Eliquis and metoprolol) s/p recent Afib ablation on 3/10/2022 who presented today with worsening CP. Cardiology consulted for ACS evaluation    # ACS evaluation  - patient with atypical chest pain. Pain aggravated with pressure on chest and back though feels different from initial sharp pain patient had.   - Trop negative (19-19)  - EKG in chart without ischemic changes noted.  - Given above findings, unlikely coronary disease.  - awaiting for TTE.  - monitor on tele.  - EP team notified for patient's visit.    # medical optimization for cardiac perspective.  - no active anginal chest pain. Trop negative. EKG without ischemic changes.   - fair METs at baseline (4)  - RCRI score 0  - These risk estimates should be incorporated into a shared decision making conversation between the patient or their surrogate and the performing practitioner regarding the risk, benefits, and alternatives to the procedure and whether to proceed. Additional cardiac testing is unlikely to change the risk assessment or procedural outcomes from a cardiac perspective.    General Cardiology will sign off. Please re-consult as needed.    Sonya Astorga MD  Cardiology Fellow - PGY 4  Text or Call: [phone]  For all New Consults and Questions:  [email or web address]   Login: [email or web address]

## 2022-03-24 NOTE — PHYSICAL THERAPY INITIAL EVALUATION ADULT - MANUAL MUSCLE TESTING RESULTS, REHAB EVAL
Bilateral LE grossly 4+/5; Bilateral shoulder flexion and abduction 3/5; bilateral elbow flexors and extensors 4/5;  strength impaired bilaterally

## 2022-03-24 NOTE — H&P ADULT - ASSESSMENT
72F  history HTN, Hyperlipidemia, HARIS on CPAP, pericarditis, migraine, depression, fibromyalgia, GERD, COVID-19, and Afib s/p 3/10/22 ablation admitted for chest pain.     EP and cardio consult appreciated.  72F  history HTN, Hyperlipidemia, HARIS on CPAP, pericarditis, migraine, depression, fibromyalgia, GERD, COVID-19, and Afib s/p 3/10/22 ablation admitted a sensation of food getting stuck in her esophagus when she eats and chest pain.     EP and cardio consult appreciated.  This is a 72F with history as above who presents to the hospital with worsening chest/back pain and new onset odynophagia since her recent AFib ablation.     EP and cardio consult appreciated.

## 2022-03-24 NOTE — ED ADULT NURSE REASSESSMENT NOTE - NS ED NURSE REASSESS COMMENT FT1
Report received from chaparrita RN. Patient A&Ox4, resting in stretcher, respirations even and unlabored. Patient comfortable. Awaiting CTA results at this time. Vitals stable. Stretcher in lowest position, wheels locked, appropriate side rails in place, call bell in reach.
Report given to ESSU2 RN. Patient A&Ox4, resting in stretcher, respirations even and unlabored. Belongings with patient. Vitals stable.

## 2022-03-24 NOTE — H&P ADULT - MUSCULOSKELETAL
[both] : pain during and after intercourse [denies] : denies pain with orgasm [Anxiety] : anxiety [Negative] : Heme/Lymph no joint swelling/no joint erythema/no joint warmth/no calf tenderness/normal strength details… detailed exam

## 2022-03-24 NOTE — CONSULT NOTE ADULT - NS_MD_PANP_GEN_ALL_CORE
Attending and PA/NP shared services statement (NON-critical care):
Attending and PA/NP shared services statement (NON-critical care):

## 2022-03-24 NOTE — H&P ADULT - NSHPLABSRESULTS_GEN_ALL_CORE
EKG SB @ 57b/ min, prolonged QT/ QTC= 612/ 595.   CXR Clear  CTA Chest, Abd, Pelvis: No aortic dissection.    Trop 19-> 19   D Dimer= 262.  ProBNP= 276  COVID Negative.                           13.7   9.01  )-----------( 291      ( 23 Mar 2022 15:43 )             40.8   03-23    141  |  104  |  17  ----------------------------<  99  4.0   |  24  |  0.66    Ca    9.4      23 Mar 2022 15:43    TPro  7.6  /  Alb  4.0  /  TBili  0.4  /  DBili  x   /  AST  23  /  ALT  18  /  AlkPhos  95  03-23 EKG SB @ 57b/ min, prolonged QT/ QTC= 612/ 595. No significant ST-T wave changes but low voltage and cannot fully discern T waves.   CXR Clear  CTA Chest, Abd, Pelvis: No aortic dissection.    Trop 19-> 19   D Dimer= 262.  ProBNP= 276  COVID Negative.                           13.7   9.01  )-----------( 291      ( 23 Mar 2022 15:43 )             40.8   03-23    141  |  104  |  17  ----------------------------<  99  4.0   |  24  |  0.66    Ca    9.4      23 Mar 2022 15:43    TPro  7.6  /  Alb  4.0  /  TBili  0.4  /  DBili  x   /  AST  23  /  ALT  18  /  AlkPhos  95  03-23

## 2022-03-24 NOTE — PROVIDER CONTACT NOTE (OTHER) - SITUATION
Patient refused AM metoprolol and stated that she was prescribed metoprolol a few years ago and after she took it, developed difficulty breathing

## 2022-03-24 NOTE — H&P ADULT - PROBLEM SELECTOR PLAN 4
Continue CPAP setting 10 QHS. F/U Lipid panel  Continue Statin. - Has mild b/l LE pitting edema, but proBNP here not significantly elevated, also with bibasilar crackles on auscultations but CTA Chest without any acute lung pathology  - Unclear if patient with fluid overload or other issue causing these findings, currently does not seem grossly fluid overloaded, would therefore obtain TTE, holding off on diuretics for now  - Unclear if her bibasilar crackles are related to her recent ablation or other, for now would place on incentive spirometer, if crackles don't improve then consider further work up for the crackles  - Low suspicion for thromboembolism as a cause of her LE edema/bibasilar crackles as her d-dimer is low for age and patient is compliant with her eliquis

## 2022-03-24 NOTE — PATIENT PROFILE ADULT - FALL HARM RISK - HARM RISK INTERVENTIONS

## 2022-03-24 NOTE — H&P ADULT - HISTORY OF PRESENT ILLNESS
72F, self ambulating, history of HTN, Hyperlipidemia, HARIS on CPAP, pericarditis, migraine, depression, fibromyalgia, GERD, COVID-19, and symptomatic persistent Afib (on Eliquis and metoprolol) s/p  Afib ablation on 3/10/2022, experiencing chest pain to mid-sternal pain and mid back for about 3 days after the ablation. The chest pain is described as a non exertional, tightness that becomes sharp causing dyspnea and difficulty sleeping. She endorses b/l LE swelling. On 3/23, the pt was resting in bed and developed an episode of acute sharp 10/10 chest pain described as someone stabbing her in the chest to the back. The episode last for about 3 hours and began to resolve. Endorses SOB, diaphoresis and dizziness. EMS was called and she was given ASA and SL nitro x2 which helped alleviate the chest pain. Denies HA, falls, cough, palpitation, FUENTES, nausea, vomit, diarrhea, constipation, abdominal pain, chills, generalized body aches.     In the Ed, the pt was seen by cardio and EP. Their consults and recommendations are in the chart.   EKG SB @ 57b/ min, prolonged QT/ QTC= 612/ 595.   Trop 19-> 19     Vitals: Temp 97.9* oral, HR= 57b/ min, BP = 155/ 72, RR= 17b/ min, SPO2= 100% ra      72F, self ambulating, history of HTN, Hyperlipidemia, HARIS on CPAP, pericarditis, migraine, depression, fibromyalgia, GERD, COVID-19, and Afib on Eliquis and metoprolol s/p  Afib ablation on 3/10/2022, experiencing chest pain to mid-sternal pain and mid back for about 3 days after the ablation. The chest pain is described as a non exertional, tightness that becomes sharp causing dyspnea and difficulty sleeping. She endorses b/l LE swelling. On 3/23, the pt was resting in bed and developed an episode of acute sharp 10/10 chest pain described as someone stabbing her in the chest to the back. The episode last for about 3 hours and began to resolve. Endorses SOB, diaphoresis and dizziness. EMS was called and she was given ASA and SL nitro x2 which helped alleviate the chest pain. Denies HA, falls, cough, palpitation, FUENTES, nausea, vomit, diarrhea, constipation, abdominal pain, chills, generalized body aches.     In the Ed, the pt was seen by cardio and EP. Their consults and recommendations are in the chart.   EKG SB @ 57b/ min, prolonged QT/ QTC= 612/ 595.   Trop 19-> 19     Vitals: Temp 97.9* oral, HR= 57b/ min, BP = 155/ 72, RR= 17b/ min, SPO2= 100% ra      This is a 72F with history of AFib (on eliquis, recent ablation on 3/10/22 by Dr. Corley), HTN, HLD, and HARIS on CPAP    72F, self ambulating, history of HTN, Hyperlipidemia, HARIS on CPAP, pericarditis, migraine, depression, fibromyalgia, GERD, prior COVID-19, and Afib on Eliquis s/p  Afib ablation on 3/10/2022, experiencing chest pain to mid-sternal pain and mid back for about 3 days after the ablation. The chest pain is described as a non exertional, tightness that becomes sharp causing dyspnea and difficulty sleeping. She endorses b/l LE swelling. On 3/23, the pt was resting in bed and developed an episode of acute sharp 10/10 chest pain described as someone stabbing her in the chest to the back. The episode last for about 3 hours and began to resolve. Endorses SOB, diaphoresis and dizziness. EMS was called and she was given ASA and SL nitro x2 which helped alleviate the chest pain. Denies HA, falls, cough, palpitation, FUENTES, nausea, vomit, diarrhea, constipation, abdominal pain, chills, generalized body aches.     In the Ed, the pt was seen by cardio and EP. Their consults and recommendations are in the chart.   EKG SB @ 57b/ min, prolonged QT/ QTC= 612/ 595.   Trop 19-> 19     Vitals: Temp 97.9* oral, HR= 57b/ min, BP = 155/ 72, RR= 17b/ min, SPO2= 100% ra      This is a 72F with history of AFib (on eliquis, recent ablation on 3/10/22 by Dr. Corley), HTN, HLD, HARIS on CPAP, and Depression/Anxiety who presents to the hospital with complaints of chest/back pain. Patient a recent AFib ablation on 3/10 and was doing well but then started to have a severe pain radiating from her back to her chest. Said that the pain felt like a punch and would persist for long periods of time. Initially thought the pain was a side effect of her ablation but had a severe exacerbation of the pain on 3/23/22 while she was getting blood drawn. Said that she had associated diaphoresis, nausea, palpitations, and L arm numbness/tingling. EMS was called and she was given aspirin and nitroglycerin with improvement inher symptoms and was brought to the hospital. Currently said at she still feels a subternal/epigastric pain that radiates from her back but not as severe as earlier in the day, other symptoms are now absent. Also has noted recent odynophagia with solid foods but is able to tolerate water without issues, has lost a few pounds over the past few weeks as a result of this. Otherwise is c/o some pain/irritation in her R lower eye lid, no discharge, no vision issues. States that her b/l LEs have been swollen since the procedure, states that she has been ambulating well since the procedure. No other acute complaints.     In the Ed, the pt was seen by cardio and EP. Their consults and recommendations are in the chart.   EKG SB @ 57b/ min, prolonged QT/ QTC= 612/ 595.   Trop 19-> 19     Vitals: Temp 97.9* oral, HR= 57b/ min, BP = 155/ 72, RR= 17b/ min, SPO2= 100% ra

## 2022-03-24 NOTE — H&P ADULT - PROBLEM SELECTOR PLAN 10
VTE covered with Eliquis  Fall, Aspiration, safety precautions.  Cardio, EP, PT eval. Continue PPI - Tylenol po PRN

## 2022-03-24 NOTE — H&P ADULT - NEGATIVE ENMT SYMPTOMS
no ear pain/no tinnitus/no sinus symptoms/no nasal congestion/no nasal obstruction/no dysphagia no ear pain/no tinnitus/no sinus symptoms/no nasal congestion/no nasal obstruction/no throat pain/no dysphagia

## 2022-03-24 NOTE — H&P ADULT - NEUROLOGICAL DETAILS
alert and oriented x 3/responds to verbal commands/sensation intact alert and oriented x 3/responds to verbal commands/sensation intact/normal strength

## 2022-03-24 NOTE — H&P ADULT - NS ATTEND AMEND GEN_ALL_CORE FT
Patient seen and examined on 3/24/22. This is a 72F with history as above who presents to the hospital with worsening chest/back pain but also with new onset odynophagia since her recent AFib ablation. Work up so far not revealing of acute issues, suspect patient might have esophagitis/gastritis as a cause of her symptoms, would c/w work up as above, PPI BID, GI eval, clear liquid diet. Other management as above.

## 2022-03-24 NOTE — H&P ADULT - PROBLEM SELECTOR PLAN 3
F/U Lipid panel  Continue Statin. BP = 155/ 72  Low salt diet.  Continue BP meds. - s/p recent ablation, currently in sinus rhythm but sharon  - Would c/w eliquis given her elevated CHADSVASC score, c/w diltiazem  - Patient states she is allergic to metoprolol, has SOB and palpitations with it, allergy list updated and metoprolol d/adan  - Of note, EKG shows a prolonged QT interval to 595 but is low voltage, cannot reliably see T waves, would repeat EKG in AM to re-assess QT interval, notified EP and covering ACP of this

## 2022-03-24 NOTE — PROVIDER CONTACT NOTE (OTHER) - BACKGROUND
(Admit Diagnosis) Palpitations  (PMH) Migraines  (PMH) Latex allergy  (PMH) Anxiety and depression  (PMH) H/O fibromyalgia  (PMH) Atrial fibrillation

## 2022-03-24 NOTE — H&P ADULT - PROBLEM SELECTOR PLAN 11
VTE covered with Eliquis  Fall, Aspiration, safety precautions.  Cardio, EP, PT eval. - Currently on PPI pantoprazole 40mg IVP BID

## 2022-03-24 NOTE — CONSULT NOTE ADULT - ASSESSMENT
Hematology/Oncology consulted on this 72 year old female with history of HTN, Hyperlipidemia, HARIS on CPAP, pericarditis, migraine, depression, fibromyalgia, GERD, prior COVID-19, and Afib on Eliquis s/p  Afib ablation on 3/10/2022, known to [facility] and follows with Dr Garcia for easy bruising who presented to the ED at [facility] with complaints of chest pain. Her outpatient work up for a bleeding disorder was negative.     ACS evaluation  --Atypical chest pain  -- Troponins are negative    -- No ischemic changes noted per cardiology  --obtain TTE given patient's recent ablation procedure  -- no need to treat for ACS at this time per cards  --appreciate [facility]      ACC: [id number] EXAM:  CT ANGIO CHEST PULM ART [facility]                        ACC: [id number] EXAM:  CT ANGIO ABD PELV (W)AW IC                          03/23/2022    IMPRESSION:  No aortic dissection.    Patient may follow with Dr Garcia at [facility] after discharge    Carrie Mims NP  Hematology/Oncology  [facility]  [phone] (Office)  [phone] (Alt office)  Evenings and weekends please call MD on call or office

## 2022-03-24 NOTE — H&P ADULT - RS GEN PE MLT RESP DETAILS PC
airway patent/breath sounds equal/good air movement/respirations non-labored/clear to auscultation bilaterally/no chest wall tenderness/no intercostal retractions/no rales/no rhonchi/no subcutaneous emphysema/no wheezes/airway obstructed airway patent/breath sounds equal/good air movement/respirations non-labored/clear to auscultation bilaterally/no chest wall tenderness/no intercostal retractions/no subcutaneous emphysema/no wheezes/airway obstructed/rales

## 2022-03-24 NOTE — H&P ADULT - PROBLEM SELECTOR PLAN 1
Cardiac monitor.  Plan per cardio & EP   TROP 19--> 19  F/U #3 CE, TTE, TSH.   Give O2, BB, Statin. ASA on hold due to pt on Eliquis.  Monitor lytes. - Initial concern for ACS vs dissection as a cause of her symptoms, also considering PE but lower suspicion as she is compliant with the eliquis  - Here work up so far negative for ACS given low and stable trops and non-ischemic EKG, CTA Chest/Abd neg for dissection, and d-dimer low for her age making suspicion for PE low  - Would check a TTE but currently doubt her chest/back pain is 2/2 cardiac issues, given new onset of her odynophagia there is a concern she might have developed esophagitis/gastritis after the ablation causing her pain, would therefore start her on PPI BID 40mg IVP and obtain GI eval  - F/U #3 CE, TTE, TSH.   - Give O2, Statin. ASA on hold due to pt on Eliquis.  - Monitor lytes.

## 2022-03-24 NOTE — H&P ADULT - PROBLEM SELECTOR PLAN 5
Continue Colchicine po BID x 14 more days. Continue CPAP setting 10 QHS. - BP = 155/ 72  - Low salt diet.  - Continue BP meds.

## 2022-03-24 NOTE — PHYSICAL THERAPY INITIAL EVALUATION ADULT - PATIENT PROFILE REVIEW, REHAB EVAL
PT initial evaluation received and chart review completed. Pt agreeable to participate in PT evaluation./yes

## 2022-03-24 NOTE — PHYSICAL THERAPY INITIAL EVALUATION ADULT - DISCHARGE DISPOSITION, PT EVAL
Recommend discharge home with outpatient PT services to address impairments in endurance and bilateral UE strength, mobility, and pain. Pt is independent with all functional mobility; appears at functional baseline. Pt to be removed from PT program.

## 2022-03-24 NOTE — PROGRESS NOTE ADULT - ASSESSMENT
This is a 72 year old woman with a pmhx of HTN, HLD, HARIS (on CPAP QHS), pericarditis (aged 26 ad 29), migraine depression, Fibromyalgia, GERD, COVID-19, and symptomatic persistent Afib (on Eliquis and metoprolol) s/p recent Afib ablation on 3/10/2022 and discharge on colchicine who presented today with worsening CP that radiated down the left arm and back. Patient rated the pain as a 9/10 which lasted for 2-3 hours. It was associated with dizziness, SOB, nausea, and chills.       - No overnight event, denies CP, SOB, Palpitation.  - Continuous telemetric monitoring  - Monitor electrolytes and replete K to 4 and Mg to 2  - Pending TTE , CTA negative   - Continue Eliquis 5 mg po BID for thromboembolic ppx  given that patient has a [id number] score of 3 (age, sex, HTN)  - Continue pantoprazole, Colchicine  - Continue home metoprolol 50mg po qd   This is a 72 year old woman with a pmhx of HTN, HLD, HARIS (on CPAP QHS), pericarditis (aged 26 ad 29), migraine depression, Fibromyalgia, GERD, COVID-19, and symptomatic persistent Afib (on Eliquis and metoprolol) s/p recent Afib ablation on 3/10/2022 and discharge on colchicine who presented today with worsening CP that radiated down the left arm and back. Patient rated the pain as a 9/10 which lasted for 2-3 hours. It was associated with dizziness, SOB, nausea, and chills.       - No overnight event, denies CP, SOB, Palpitation.  - Continuous telemetric monitoring  - Monitor electrolytes and replete K to 4 and Mg to 2  - Pending TTE , CTA negative   - Continue Eliquis 5 mg po BID for thromboembolic ppx  given that patient has a [id number] score of 3 (age, sex, HTN)  - Continue PPI pantoprazole, Colchicine    Addendum :    EKG today revealed SR at 65bpm, nonspecific T wave abnormality, MT 170ms, QT/QTc 390/405ms ( QT/QTc 612/595 ms on EKG 3/23), patient is not taking qtc prolonging meds. EKG to be reviewed with attending DR. Tamayo.

## 2022-03-24 NOTE — H&P ADULT - NSICDXPASTSURGICALHX_GEN_ALL_CORE_FT
PAST SURGICAL HISTORY:  Bladder Prolapse, Acquired s/p repair    S/P AV melchor ablation 3/10/22    Status Post Breast Lumpectomy right benign    Status post total right knee replacement 10/25/17    Tubal Ligation

## 2022-03-24 NOTE — PHYSICAL THERAPY INITIAL EVALUATION ADULT - GENERAL OBSERVATIONS, REHAB EVAL
Upon entry, pt seated in bedside chair in NAD. Pt left as received with all tubes/lines intact, call bell in reach and in NAD.

## 2022-03-24 NOTE — H&P ADULT - PROBLEM SELECTOR PLAN 2
BP = 155/ 72  Low salt diet.  Continue BP meds. Placed on clear diet.  Protonix 40 mg IV BID  GI consult emailed. - Paient able to tolerate water, states that she has pain with solid food intake  - Would place on clear diet.  - Protonix 40 mg IV BID  - GI consult emailed. - Patient able to tolerate water, states that she has pain with solid food intake  - Would place on clear diet.  - Protonix 40 mg IV BID  - GI consult emailed.

## 2022-03-24 NOTE — CONSULT NOTE ADULT - SUBJECTIVE AND OBJECTIVE BOX
Patient seen and evaluated at bedside  Consult question: Chest pain.    Brief HPI:  72 year old woman with a pmhx of HTN, HLD, HARIS (on CPAP QHS), pericarditis, migraine, depression, fibromyalgia, GERD, COVID-19, and symptomatic persistent Afib (on Eliquis and metoprolol) s/p recent Afib ablation on 3/10/2022 who presented today with worsening CP.     She reports having mid-back and mid-sternal pain about 3 day after the ablation. When the pain worsens, it prevented patient from taking deep breath and had her trouble sleeping. The chest pressure was not exertional, positional, or pleuritic.   She also noticed LE edema bilaterally after her afib ablation procedure. Today AM, patient was resting in bed without exertion developed an acute sharp chest pain that feels like someone stabbing her from chest to back. She had associated dizziness, diaphoresis, and SOB. It was 10/10 pain, which lasted 3 hours and then slowly improved. EMS arrived and gave the patient ASA and SL nitro x2 which helped alleviate the her CP. Pt denies fever chills, rigors, palpitation, SOB at rest, abdominal pain, n/v/d, hematuria, melena, hematochezia, numbness and tingling.   At the time of examination, patient had some residual discomfort but not the same. It feels soar on her chest and back where she had pain.     PMH:   HTN (Hypertension)    Pericarditis    Muscle Cramps at Night    HARIS (obstructive sleep apnea)    Patient is Jehovah&#x27;s Witness    Primary osteoarthritis of right knee    Morbid obesity due to excess calories    No blood products    HARIS on CPAP    Overactive bladder    H/O constipation    GERD (gastroesophageal reflux disease)    Atrial fibrillation    H/O fibromyalgia    Anxiety and depression    Latex allergy    Migraines      PSH:   Status Post Breast Lumpectomy    Bladder Prolapse, Congenital    Bladder Prolapse, Acquired    Tubal Ligation    Status post total right knee replacement      Medications:     Allergies:  adhesives (Rash)  latex (Rash)  nickel allergy- rash (Rash)  No Known Drug Allergies    FAMILY HISTORY:  Family history of congenital heart disease (Sibling)    Family hx of lung cancer (Father)    FH: breast cancer (Sibling)      Social History:  Smoking: NA  Alcohol: NA   Drugs: NA    Review of Systems:  Constitutional: [ ] Fever [ ] Chills [ ] Fatigue [ ] Weight change   HEENT: [ ] Blurred vision [ ] Eye Pain [ ] Headache [ ] Runny nose [ ] Sore Throat   Respiratory: [ ] Cough [ ] Wheezing [ ] Shortness of breath  Cardiovascular: [ x] Chest Pain [ ] Palpitations [ ] FUENTES [ ] PND [ ] Orthopnea  Gastrointestinal: [ ] Abdominal Pain [ ] Diarrhea [ ] Constipation [ ] Hemorrhoids [ ] Nausea [ ] Vomiting  Genitourinary: [ ] Nocturia [ ] Dysuria [ ] Incontinence  Extremities: [x ] Swelling [ ] Joint Pain  Neurologic: [ ] Focal deficit [ ] Paresthesias [ ] Syncope  Lymphatic: [ ] Swelling [ ] Lymphadenopathy   Skin: [ ] Rash [ ] Ecchymoses [ ] Wounds [ ] Lesions  Psychiatry: [ ] Depression [ ] Suicidal/Homicidal Ideation [ ] Anxiety [ ] Sleep Disturbances  [ ] 10 point review of systems is otherwise negative except as mentioned above            [ ]Unable to obtain    Physical Exam:  T(C): 36.5 (03-23-22 @ 15:00), Max: 36.7 (03-23-22 @ 13:14)  HR: 60 (03-23-22 @ 15:00) (60 - 85)  BP: 165/69 (03-23-22 @ 15:00) (150/80 - 165/69)  RR: 16 (03-23-22 @ 15:00) (16 - 18)  SpO2: 100% (03-23-22 @ 15:00) (98% - 100%)  Wt(kg): --    Daily Height in cm: 154.94 (23 Mar 2022 13:14)    Daily     Constitutional: NAD.   HEENT: AT/NC, EOMI, Supple neck;  Respiratory: no wheezing or crackles. no increase in WOB  Cardiovascular: RRR, S1, S2, systolic murmur. 2+ distal pulses. no JVD, 2+ b/l LE edema.  Gastrointestinal: soft; NT/ND, +BS  Extremities: no cyanosis; non-tender to palpation, DP and Radial pulses intact.  Neurological: A&Ox 3;  Psychiatric: normal mood/affect.      Cardiovascular Diagnostic Testing:  ECG done at outside: NSR w/ PVCs.      Imaging:    Labs:                        13.7   9.01  )-----------( 291      ( 23 Mar 2022 15:43 )             40.8     03-23    141  |  104  |  17  ----------------------------<  99  4.0   |  24  |  0.66    Ca    9.4      23 Mar 2022 15:43    TPro  7.6  /  Alb  4.0  /  TBili  0.4  /  DBili  x   /  AST  23  /  ALT  18  /  AlkPhos  95  03-23                
Reason for consult: Easy bruisability    HPI:  This is a 72F with history of AFib (on eliquis, recent ablation on 3/10/22 by Dr. Corley), HTN, HLD, and HARIS on CPAP    72F, self ambulating, history of HTN, Hyperlipidemia, HARIS on CPAP, pericarditis, migraine, depression, fibromyalgia, GERD, prior COVID-19, and Afib on Eliquis s/p  Afib ablation on 3/10/2022, experiencing chest pain to mid-sternal pain and mid back for about 3 days after the ablation. The chest pain is described as a non exertional, tightness that becomes sharp causing dyspnea and difficulty sleeping. She endorses b/l LE swelling. On 3/23, the pt was resting in bed and developed an episode of acute sharp 10/10 chest pain described as someone stabbing her in the chest to the back. The episode last for about 3 hours and began to resolve. Endorses SOB, diaphoresis and dizziness. EMS was called and she was given ASA and SL nitro x2 which helped alleviate the chest pain. Denies HA, falls, cough, palpitation, FUENTES, nausea, vomit, diarrhea, constipation, abdominal pain, chills, generalized body aches.     In the Ed, the pt was seen by cardio and EP. Their consults and recommendations are in the chart.   EKG SB @ 57b/ min, prolonged QT/ QTC= 612/ 595.   Trop 19-> 19     Vitals: Temp 97.9* oral, HR= 57b/ min, BP = 155/ 72, RR= 17b/ min, SPO2= 100% ra      (24 Mar 2022 01:13)      Hematology/Oncology consulted on this 72 year old female with history of HTN, Hyperlipidemia, HARIS on CPAP, pericarditis, migraine, depression, fibromyalgia, GERD, prior COVID-19, and Afib on Eliquis s/p  Afib ablation on 3/10/2022, known to [facility] and follows with Dr Garcia for easy bruising who presented to the ED at [facility] with complaints of chest pain. Her outpatient work up for a bleeding disorder was negative.     PAST MEDICAL & SURGICAL HISTORY:  HTN (Hypertension)    Pericarditis  around age 29&#x27;s    Muscle Cramps at Night    HARIS (obstructive sleep apnea)  noncompliant with CPAP    Patient is Jehovah&#x27;s Witness  refuse blood products    Primary osteoarthritis of right knee    Morbid obesity due to excess calories    Overactive bladder    H/O constipation    GERD (gastroesophageal reflux disease)    Atrial fibrillation  persistent in 2022    H/O fibromyalgia    Anxiety and depression    Latex allergy    Migraines    Status Post Breast Lumpectomy  right benign    Bladder Prolapse, Acquired  s/p repair    Tubal Ligation    Status post total right knee replacement  10/25/17    S/P AV melchor ablation  3/10/22        FAMILY HISTORY:  Family history of congenital heart disease (Sibling)    Family hx of lung cancer (Father)    FH: breast cancer (Sibling)        Alcohol Denied  Smoking: Nonsmoker  Drug Use: Denied  Marital Status:         Allergies    adhesives (Rash)  latex (Rash)  metoprolol (Short breath)  nickel allergy- rash (Rash)    Intolerances        MEDICATIONS  (STANDING):  apixaban 5 milliGRAM(s) Oral every 12 hours  atorvastatin 20 milliGRAM(s) Oral at bedtime  colchicine 0.6 milliGRAM(s) Oral every 12 hours  diltiazem    milliGRAM(s) Oral daily  hydrochlorothiazide 12.5 milliGRAM(s) Oral daily  influenza  Vaccine (HIGH DOSE) 0.7 milliLiter(s) IntraMuscular once  losartan 100 milliGRAM(s) Oral daily  oxybutynin XL 10 milliGRAM(s) Oral daily  pantoprazole  Injectable 40 milliGRAM(s) IV Push every 12 hours  senna 2 Tablet(s) Oral at bedtime  sodium chloride 0.9% lock flush 3 milliLiter(s) IV Push every 8 hours    MEDICATIONS  (PRN):  acetaminophen     Tablet .. 650 milliGRAM(s) Oral every 6 hours PRN Temp greater or equal to 38C (100.4F), Mild Pain (1 - 3), Moderate Pain (4 - 6)      ROS  No fever, sweats, chills  No epistaxis, HA, sore throat  No CP, SOB, cough, sputum  No n/v/d, abd pain, melena, hematochezia  No edema  No rash  No anxiety  No back pain, joint pain  No bleeding, bruising  No dysuria, hematuria    T(C): 36.6 (03-24-22 @ 05:42), Max: 36.7 (03-23-22 @ 13:14)  HR: 64 (03-24-22 @ 06:43) (57 - 85)  BP: 155/74 (03-24-22 @ 05:42) (147/75 - 165/69)  RR: 18 (03-24-22 @ 05:42) (14 - 18)  SpO2: 98% (03-24-22 @ 06:43) (98% - 100%)  Wt(kg): --    PE  NAD  Awake, alert  Anicteric, MMM  RRR  CTAB  Abd soft, NT, ND  No c/c/e  No rash grossly                            13.7   9.01  )-----------( 291      ( 23 Mar 2022 15:43 )             40.8       03-23    141  |  104  |  17  ----------------------------<  99  4.0   |  24  |  0.66    Ca    9.4      23 Mar 2022 15:43    TPro  7.6  /  Alb  4.0  /  TBili  0.4  /  DBili  x   /  AST  23  /  ALT  18  /  AlkPhos  95  03-23      ACC: [id number] EXAM:  CT ANGIO CHEST PULM ART [facility]                        ACC: [id number] EXAM:  CT ANGIO ABD PELV (W)AW IC                          PROCEDURE DATE:  03/23/2022          INTERPRETATION:  CLINICAL INFORMATION: Chest pain, evaluate for aortic   dissection.    COMPARISON: Chest radiograph from the same date. Chest CTA dated   6/14/2011.    CONTRAST/COMPLICATIONS:  IV Contrast: Omnipaque 350  90 cc administered   10 cc discarded  Oral Contrast: NONE  Complications: None reported at time of study completion    PROCEDURE:  CT Angiography of the Chest, Abdomen and Pelvis.  Precontrast imaging was performed through the chest followed by arterial   phase imaging of the chest, abdomen and pelvis.  Sagittal and coronal reformats were performed as well as 3D (MIP)   reconstructions.    FINDINGS:  CHEST:  LUNGS AND LARGE AIRWAYS: Patent central airways. The lungs are clear.  PLEURA: No pleural effusion or pneumothorax.  VESSELS: No aortic dissection, intramural hematoma or aneurysm.  HEART: Qualitative enlargement of the left atrium. No pericardial   effusion. Coronary arterial calcification is present  MEDIASTINUM AND GABRIELA: No lymphadenopathy.  CHEST WALL AND LOWER NECK: Within normal limits.    ABDOMEN AND PELVIS:  LIVER: Within normal limits. Punctate calcification in the left hepatic   lobe.  BILE DUCTS: Normal caliber.  GALLBLADDER: Within normal limits.  SPLEEN: Within normal limits.  PANCREAS: Within normal limits.  ADRENALS: Within normal limits.  KIDNEYS/URETERS: No hydronephrosis or nephrolithiasis.    BLADDER: Within normal limits.  REPRODUCTIVE ORGANS: Uterus and adnexa within normal limits.    BOWEL: No bowel obstruction. Appendix is normal.  PERITONEUM: No ascites.  VESSELS: The abdominal aorta is normal in caliber without aneurysm or   dissection. There is at least mild stenosis of the origins of the celiac   axis, superior mesenteric artery, inferior mesenteric artery secondary to   atherosclerotic plaque. The bilateral renal arteries are patent.   Atherosclerotic changes.  RETROPERITONEUM/LYMPH NODES: No lymphadenopathy.  ABDOMINAL WALL: Small fat-containing umbilical hernia.  BONES: Degenerative changes.    IMPRESSION:  No aortic dissection.        --- End of Report ---  
Source: patient and Chart    HPI:  This is a 72 year old woman with a pmhx of HTN, HLD, HARIS (on CPAP QHS), pericarditis (aged 26 ad 29), migraine Depression, Fibromyalgia, GERD, COVID-19, and symptomatic persistent Afib (on Eliquis and metoprolol) s/p recent Afib ablation on 3/10/2022 who presented today with worsening CP.     Of notes, patient discharge on Colchicine 0.6mg BID for 2 week after her ablation. According to the patient, she started having mid-back pain about 3 day after the ablation and then started having mid-sternal "chest discomfort" which she rated as a 2/10 in terms on intensity. She stated that the pain was pressure like in nature and prevented her from sleeping. Patient sleeps with 1 pillow at baseline. Patient stated the chest pressure was constant and nothing made it better or worse. She stated the chest discomfort was the same with exertion, resting, lying flat, and leaning forward. She also stated that she had throat soreness with swallowing which caused her to have a decrease appetite, and LE edema. Today, patient went to see her PCP at [facility]. Patient was in the waiting room and when she stood up she started having sharp, swabbing CP that was mid-sternal and radiated down the left arm and back. Patient rated the pain as a 9/10 which lasted for 2-3 hours. It was associated with dizziness, SOB, nausea, and chills. Patient was escorted to the exam room where an EKG was done. Patient stated she was told to go to the ED after the doctor looked at the EKG. EMS arrived and gave the patient ASA and SL nitrox2 which helped alleviate the her CP. Patient denied fever, diaphoresis, HA, changes in visions, LOC, syncope, cold like symptoms  (sore throat cough, conjunctivitis runny nose), palpitation, abdominal pain, n/v/d, hematuria, melena, hematochezia, numbness and tingling.     ED course was significant for T=98F, HR 85BPM, /80mmHg,  RR 16/min spo2 100% on RA. Labs were significant for WBC 9.01, Hgb 13.7, HCT 40.8, , Na 141, K 4.0, BUN 17, sCr 0.66, LFT wln ,H troponin T 19. EKG showed SR HR  57BPM  and clinic EKG showed SR with PVC HR 63.     PAST MEDICAL & SURGICAL HISTORY:  HTN (Hypertension)  Pericarditis around age 29&#x27;s  Muscle Cramps at Night  HARIS (obstructive sleep apnea) noncompliant with CPAP  Patient is Jose Guadalupe&#x27;s Witness refuse blood products  Primary osteoarthritis of right knee  Morbid obesity due to excess calories  Overactive bladder  H/O constipation  GERD (gastroesophageal reflux disease)  Atrial fibrillationpersistent in 2022  H/O fibromyalgia  Anxiety and depression  Latex allergy  Migraines  Status Post Breast Lumpectomyright benign  Bladder Prolapse, Acquireds/p repair  Tubal Ligation  Status post total right knee replacement  10/25/17      MEDICATIONS  (STANDING):    MEDICATIONS  (PRN):      FAMILY HISTORY:  Family history of congenital heart disease (Sibling)  Family hx of lung cancer (Father)  FH: breast cancer (Sibling)    SOCIAL HISTORY:  LIVING SITUATION: Lives with her    CIGARETTES: Denied  ALCOHOL: denied   ILLICIT DRUG USES: denied    REVIEW OF SYSTEMS:  CONSTITUTIONAL: No fever, weight loss, chills, shakes, or fatigue  RESPIRATORY: per HPI  CARDIOVASCULAR: per HPI  GASTROINTESTINAL: No abdominal  or epigastric pain, nausea, vomiting, hematemesis, diarrhea, constipation, melena or bright red blood.  GENITOURINARY: No dysuria, nocturia, hematuria, or urinary incontinence  NEUROLOGICAL: No headaches, memory loss, slurred speech, limb weakness, loss of strength, numbness, or tremors  MUSCULOSKELETAL: No joint pain or swelling, muscle, or extremity pain, Back pain     Vital Signs Last 24 Hrs  T(C): 36.5 (23 Mar 2022 15:00), Max: 36.7 (23 Mar 2022 13:14)  T(F): 97.7 (23 Mar 2022 15:00), Max: 98 (23 Mar 2022 13:14)  HR: 60 (23 Mar 2022 15:00) (60 - 85)  BP: 165/69 (23 Mar 2022 15:00) (150/80 - 165/69)  BP(mean): --  RR: 16 (23 Mar 2022 15:00) (16 - 18)  SpO2: 100% (23 Mar 2022 15:00) (98% - 100%)    PHYSICAL EXAM:  GENERAL: Well appearing, speaking in full sentence, in NAD  HEART: S1S2 RRR  PULMONARY:CTABL, normal respiratory effort.   ABDOMEN: obese contour, Bowel sounds present, soft  EXTREMITIES:  Warm, well -perfused, 1+ LE pitted edema, distal pulses present  NEUROLOGICAL:AOx3     INTERPRETATION OF TELEMETRY: SR HR mid 50-60  ECG:SR HR  57BPM     I&O's Detail      LABS:                        13.7   9.01  )-----------( 291      ( 23 Mar 2022 15:43 )             40.8     03-23    141  |  104  |  17  ----------------------------<  99  4.0   |  24  |  0.66    Ca    9.4      23 Mar 2022 15:43    TPro  7.6  /  Alb  4.0  /  TBili  0.4  /  DBili  x   /  AST  23  /  ALT  18  /  AlkPhos  95  03-23        BNP  I&O's Detail    Daily Height in cm: 154.94 (23 Mar 2022 13:14)    Daily     RADIOLOGY & ADDITIONAL STUDIES:  
  HPI:  73 yo F w/ PMHx HTN, HLD, HARIS, GERD, afib w/ recent ablation presenting w/ chest pain. GI consulted for odynophagia since afib ablation.     Patient states she underwent afib ablation 13 days prior to presentation (3/10/22). After the procedure, she developed chest pain, substernal, exertional. Pain was well controlled until day of presentation when it became severe, radiated to her back and L shoulder. She went to her PCP, who referred her to the ED.   Patient states since the afib ablation she has been having worsening odynophagia to solids and liquids. She describes after swallowing the food/liquid, she develops mid sternal chest pain and feels the food going doing. No nausea or vomiting. She has been coughing since the procedure, though it is not worse post-prandially. No abdominal pain. Her last bowel movement was 3 days ago, and was normal at that time. No prior EGD. Has had prior colonoscopies many years ago.     On presentation patient was given ASA and SL nitro w/ improvement in her chest pain. Seen by EP and cardiology; CTA chest negative for dissection. Trop negative. Awaiting TTE    Allergies:  adhesives (Rash)  latex (Rash)  metoprolol (Short breath)  nickel allergy- rash (Rash)      Home Medications:    Hospital Medications:  acetaminophen     Tablet .. 650 milliGRAM(s) Oral every 6 hours PRN  apixaban 5 milliGRAM(s) Oral every 12 hours  atorvastatin 20 milliGRAM(s) Oral at bedtime  colchicine 0.6 milliGRAM(s) Oral every 12 hours  diltiazem    milliGRAM(s) Oral daily  hydrochlorothiazide 12.5 milliGRAM(s) Oral daily  influenza  Vaccine (HIGH DOSE) 0.7 milliLiter(s) IntraMuscular once  losartan 100 milliGRAM(s) Oral daily  oxybutynin XL 10 milliGRAM(s) Oral daily  pantoprazole  Injectable 40 milliGRAM(s) IV Push every 12 hours  senna 2 Tablet(s) Oral at bedtime  sodium chloride 0.9% lock flush 3 milliLiter(s) IV Push every 8 hours      PMHX/PSHX:  HTN (Hypertension)    Pericarditis    Muscle Cramps at Night    HARIS (obstructive sleep apnea)    Patient is Jehovah&#x27;s Witness    Primary osteoarthritis of right knee    Morbid obesity due to excess calories    No blood products    HARIS on CPAP    Overactive bladder    H/O constipation    GERD (gastroesophageal reflux disease)    Atrial fibrillation    H/O fibromyalgia    Anxiety and depression    Latex allergy    Migraines    Status Post Breast Lumpectomy    Bladder Prolapse, Congenital    Bladder Prolapse, Acquired    Tubal Ligation    Status post total right knee replacement    S/P AV melchor ablation        Family history:  Family history of congenital heart disease (Sibling)    Family hx of lung cancer (Father)    FH: breast cancer (Sibling)        Denies family history of colon cancer/polyps, stomach cancer/polyps, pancreatic cancer/masses, liver cancer/disease, ovarian cancer and endometrial cancer.    Social History:   Tob: Denies  EtOH: Denies  Illicit Drugs: Denies    ROS:     General:  No wt loss, fevers, chills, night sweats, fatigue  Eyes:  Good vision, no reported pain  ENT:  No sore throat, pain, runny nose, dysphagia  CV:  No pain, palpitations, hypo/hypertension  Pulm:  No dyspnea, cough, tachypnea, wheezing  GI:  see HPI  :  No pain, bleeding, incontinence, nocturia  Muscle:  No pain, weakness  Neuro:  No weakness, tingling, memory problems  Psych:  No fatigue, insomnia, mood problems, depression  Endocrine:  No polyuria, polydipsia, cold/heat intolerance  Heme:  No petechiae, ecchymosis, easy bruisability  Skin:  No rash, tattoos, scars, edema    PHYSICAL EXAM:     GENERAL:  No acute distress  HEENT:  NCAT, no scleral icterus   CHEST:  no respiratory distress  HEART:  Regular rate and rhythm  ABDOMEN:  Soft, non-tender, non-distended, normoactive bowel sounds,  no masses  EXTREMITIES: No edema  SKIN:  No rash/erythema/ecchymoses/petechiae/wounds/abscess/warm/dry  NEURO:  Alert and oriented x 3, no asterixis    Vital Signs:  Vital Signs Last 24 Hrs  T(C): 36.6 (24 Mar 2022 05:42), Max: 36.7 (23 Mar 2022 13:14)  T(F): 97.8 (24 Mar 2022 05:42), Max: 98.1 (24 Mar 2022 03:15)  HR: 64 (24 Mar 2022 06:43) (57 - 85)  BP: 155/74 (24 Mar 2022 05:42) (147/75 - 165/69)  BP(mean): --  RR: 18 (24 Mar 2022 05:42) (14 - 18)  SpO2: 98% (24 Mar 2022 06:43) (98% - 100%)  Daily Height in cm: 160.02 (24 Mar 2022 01:13)    Daily Weight in k.9 (24 Mar 2022 05:42)    LABS:                        13.7   9.01  )-----------( 291      ( 23 Mar 2022 15:43 )             40.8     Mean Cell Volume: 87.6 fL (-22 @ 15:43)        141  |  104  |  17  ----------------------------<  99  4.0   |  24  |  0.66    Ca    9.4      23 Mar 2022 15:43    TPro  7.6  /  Alb  4.0  /  TBili  0.4  /  DBili  x   /  AST  23  /  ALT  18  /  AlkPhos  95      LIVER FUNCTIONS - ( 23 Mar 2022 15:43 )  Alb: 4.0 g/dL / Pro: 7.6 g/dL / ALK PHOS: 95 U/L / ALT: 18 U/L / AST: 23 U/L / GGT: x                                       13.7   9.01  )-----------( 291      ( 23 Mar 2022 15:43 )             40.8       Imaging:  FINDINGS:  CHEST:  LUNGS AND LARGE AIRWAYS: Patent central airways. The lungs are clear.  PLEURA: No pleural effusion or pneumothorax.  VESSELS: No aortic dissection, intramural hematoma or aneurysm.  HEART: Qualitative enlargement of the left atrium. No pericardial   effusion. Coronary arterial calcification is present  MEDIASTINUM AND GABRIELA: No lymphadenopathy.  CHEST WALL AND LOWER NECK: Within normal limits.    ABDOMEN AND PELVIS:  LIVER: Within normal limits. Punctate calcification in the left hepatic   lobe.  BILE DUCTS: Normal caliber.  GALLBLADDER: Within normal limits.  SPLEEN: Within normal limits.  PANCREAS: Within normal limits.  ADRENALS: Within normal limits.  KIDNEYS/URETERS: No hydronephrosis or nephrolithiasis.    BLADDER: Within normal limits.  REPRODUCTIVE ORGANS: Uterus and adnexa within normal limits.    BOWEL: No bowel obstruction. Appendix is normal.  PERITONEUM: No ascites.  VESSELS: The abdominal aorta is normal in caliber without aneurysm or   dissection. There is at least mild stenosis of the origins of the celiac   axis, superior mesenteric artery, inferior mesenteric artery secondary to   atherosclerotic plaque. The bilateral renal arteries are patent.   Atherosclerotic changes.  RETROPERITONEUM/LYMPH NODES: No lymphadenopathy.  ABDOMINAL WALL: Small fat-containing umbilical hernia.  BONES: Degenerative changes.    IMPRESSION:  No aortic dissection.

## 2022-03-24 NOTE — H&P ADULT - NEGATIVE PSYCHIATRIC SYMPTOMS
no suicidal ideation/no depression/no anxiety no suicidal ideation/no depression/no anxiety/no visual hallucinations/no auditory hallucinations

## 2022-03-24 NOTE — PROGRESS NOTE ADULT - ATTENDING COMMENTS
The patient was seen and examined with the Cardiology Consultation Teaching Service.     She is a 72-year-old woman with atrial fibrillation, hypertension, dyslipidemia and atrial fibrillation ablation three days ago who presented with chest pain and dysphagia. No hematemesis or hemoptysis have been noted. The pain is not pleuritic or positional. No dyspnea, orthopnea or PND. No palpitations or dizziness.    Comfortable-appearing woman in no acute distress  Alert and oriented  Afebrile  Vital signs stable  JVP is not elevated  Clear lungs  Normal heart sounds  Soft, non-tender, non-distended abdomen  Extremities are warm and perfused  Mild peripheral edema      Normal blood counts  Normal electrolytes and GFR  hs-troponin normal  pro-BNP normal  D-dimer not negative    ECG demonstrated sinus rhythm without [place] depression or ST deviation; non-specific T wave flattening  Telemetry demonstrated sinus rhythm without significant arrhythmia    Chest/abdomen/pelvis CT unremarkable; coronary calcification noted; no aortic dissection or pulmonary embolism    Impression and Recommendations:   72-year-old woman with atrial fibrillation, hypertension, dyslipidemia and atrial fibrillation ablation three days ago who presented with chest pain and dysphagia.     Given the proximity to her atrial fibrillation procedure, post-procedure complications such as atrio-esophageal fistula and pericarditis are higher in the differential. EP was immediately notified about this patient. Other causes of chest pain such as acute coronary syndrome are less likely, particularly given the normal ECG and cardiac biomarkers. Echocardiography was performed yesterday and the official report is pending. No large pericardial effusion is seen. Defer the work-up of this patient's post-ablation chest pain to the cardiac electrophysiology service.    The patient's chest pain is inconsistent with angina. There is no evidence of ongoing ischemia or decompensated heart failure. No unstable cardiac arrhythmia is present. There is no absolute contraindication to endoscopy.     The patient's Revised Cardiac Risk Index estimates a 3.9% 30-day risk of death, MI or cardiac arrest. The patient's Biswas Perioperative Risk is 0.3% for 30-day risk of myocardial infarction or cardiac arrest. These calculated risks are not validated for endoscopic procedures and may over-estimate risk for non-surgical procedures.     These risk estimates should be incorporated into a shared decision making conversation between the patient or their surrogate and the performing practitioner regarding the risks, benefits and alternatives to the procedure and whether to proceed. Additional cardiac testing is unlikely to change this risk assessment or procedural outcomes from a cardiac perspective.     Moe Grady MD  Cardiology  [phone]

## 2022-03-24 NOTE — H&P ADULT - PROBLEM SELECTOR PLAN 6
Not on any SSRI or antidepressant meds.  F/U VitD25 level, TSH. Continue Colchicine po BID x 14 more days. - F/U Lipid panel  - Continue Statin.

## 2022-03-24 NOTE — PHYSICAL THERAPY INITIAL EVALUATION ADULT - ADDITIONAL COMMENTS
Pt lives in a first-floor apartment with her spouse; there are 3 steps to enter. However, patient's son lives on the third floor of the apartment complex and she reports she regularly has to negotiate stairs as she cares for him. Pt reports she was independent with mobility, self-care, and ADLs; no assistive devices utilized.

## 2022-03-24 NOTE — H&P ADULT - PROBLEM SELECTOR PLAN 7
Tylenol po PRN Not on any SSRI or antidepressant meds.  F/U VitD25 level, TSH. - Continue CPAP setting 10 QHS.

## 2022-03-24 NOTE — CONSULT NOTE ADULT - ATTENDING COMMENTS
This patient was not evaluated by me overnight, but evaluated on the following morning.  Please see my addendum to the cardiology progress note for 3/24/2020.  Do not bill patient.     Moe Grady MD  Cardiology  [phone]
History/PE as above.  at bedside also provided history. Requested to evaluate patient regarding complaint of odynophagia following recent catheter ablation for treatment of atrial fibrillation performed 3/10. Has history of GERD and has been on Prevacid of many years duration. Prior to ablation had occasional vague intermittent symptoms of dysphagia with occasional sense of retrosternal fullness and bolus hangup. Symptoms much more pronounced following ablation. Experiencing sense of odynophagia with retrosternal pain as well as sense of bolus hangup in mid substernal region since ablation. Possible ablation related thermal esophageal injury with resultant ulcer considered. Alternative etiologies of pill-induced esophageal ulcer also considered. Less likely infectious process or symptoms attributed to GERD. Recommendations as noted-pantoprazole 40 mg b.i.d., antireflux precautions, sucralfate suspension 1 g q.i.d. and pending cardiac clearance will plan for EGD on 3/26 for further evaluation.

## 2022-03-25 LAB
ANION GAP SERPL CALC-SCNC: 17 MMOL/L — HIGH (ref 7–14)
BUN SERPL-MCNC: 12 MG/DL — SIGNIFICANT CHANGE UP (ref 7–23)
CALCIUM SERPL-MCNC: 9.6 MG/DL — SIGNIFICANT CHANGE UP (ref 8.4–10.5)
CHLORIDE SERPL-SCNC: 104 MMOL/L — SIGNIFICANT CHANGE UP (ref 98–107)
CO2 SERPL-SCNC: 20 MMOL/L — LOW (ref 22–31)
CREAT SERPL-MCNC: 0.73 MG/DL — SIGNIFICANT CHANGE UP (ref 0.5–1.3)
EGFR: 87 ML/MIN/1.73M2 — SIGNIFICANT CHANGE UP
GLUCOSE SERPL-MCNC: 94 MG/DL — SIGNIFICANT CHANGE UP (ref 70–99)
HCT VFR BLD CALC: 38.3 % — SIGNIFICANT CHANGE UP (ref 34.5–45)
HCV AB S/CO SERPL IA: 0.2 S/CO — SIGNIFICANT CHANGE UP (ref 0–0.99)
HCV AB SERPL-IMP: SIGNIFICANT CHANGE UP
HGB BLD-MCNC: 13.1 G/DL — SIGNIFICANT CHANGE UP (ref 11.5–15.5)
MAGNESIUM SERPL-MCNC: 2 MG/DL — SIGNIFICANT CHANGE UP (ref 1.6–2.6)
MCHC RBC-ENTMCNC: 29.9 PG — SIGNIFICANT CHANGE UP (ref 27–34)
MCHC RBC-ENTMCNC: 34.2 GM/DL — SIGNIFICANT CHANGE UP (ref 32–36)
MCV RBC AUTO: 87.4 FL — SIGNIFICANT CHANGE UP (ref 80–100)
NRBC # BLD: 0 /100 WBCS — SIGNIFICANT CHANGE UP
NRBC # FLD: 0 K/UL — SIGNIFICANT CHANGE UP
PHOSPHATE SERPL-MCNC: 3.9 MG/DL — SIGNIFICANT CHANGE UP (ref 2.5–4.5)
PLATELET # BLD AUTO: 306 K/UL — SIGNIFICANT CHANGE UP (ref 150–400)
POTASSIUM SERPL-MCNC: 3.6 MMOL/L — SIGNIFICANT CHANGE UP (ref 3.5–5.3)
POTASSIUM SERPL-SCNC: 3.6 MMOL/L — SIGNIFICANT CHANGE UP (ref 3.5–5.3)
RBC # BLD: 4.38 M/UL — SIGNIFICANT CHANGE UP (ref 3.8–5.2)
RBC # FLD: 12.7 % — SIGNIFICANT CHANGE UP (ref 10.3–14.5)
SODIUM SERPL-SCNC: 141 MMOL/L — SIGNIFICANT CHANGE UP (ref 135–145)
WBC # BLD: 6.53 K/UL — SIGNIFICANT CHANGE UP (ref 3.8–10.5)
WBC # FLD AUTO: 6.53 K/UL — SIGNIFICANT CHANGE UP (ref 3.8–10.5)

## 2022-03-25 PROCEDURE — 99232 SBSQ HOSP IP/OBS MODERATE 35: CPT

## 2022-03-25 PROCEDURE — 43235 EGD DIAGNOSTIC BRUSH WASH: CPT | Mod: GC

## 2022-03-25 RX ORDER — POTASSIUM CHLORIDE 20 MEQ
10 PACKET (EA) ORAL
Refills: 0 | Status: COMPLETED | OUTPATIENT
Start: 2022-03-25 | End: 2022-03-25

## 2022-03-25 RX ADMIN — Medication 1 GRAM(S): at 17:00

## 2022-03-25 RX ADMIN — Medication 10 MILLIGRAM(S): at 17:01

## 2022-03-25 RX ADMIN — APIXABAN 5 MILLIGRAM(S): 2.5 TABLET, FILM COATED ORAL at 17:00

## 2022-03-25 RX ADMIN — ATORVASTATIN CALCIUM 20 MILLIGRAM(S): 80 TABLET, FILM COATED ORAL at 21:59

## 2022-03-25 RX ADMIN — Medication 360 MILLIGRAM(S): at 05:03

## 2022-03-25 RX ADMIN — Medication 1 GRAM(S): at 12:32

## 2022-03-25 RX ADMIN — PANTOPRAZOLE SODIUM 40 MILLIGRAM(S): 20 TABLET, DELAYED RELEASE ORAL at 17:54

## 2022-03-25 RX ADMIN — Medication 100 MILLIEQUIVALENT(S): at 17:01

## 2022-03-25 RX ADMIN — Medication 0.6 MILLIGRAM(S): at 17:00

## 2022-03-25 RX ADMIN — SODIUM CHLORIDE 3 MILLILITER(S): 9 INJECTION INTRAMUSCULAR; INTRAVENOUS; SUBCUTANEOUS at 22:00

## 2022-03-25 RX ADMIN — SODIUM CHLORIDE 3 MILLILITER(S): 9 INJECTION INTRAMUSCULAR; INTRAVENOUS; SUBCUTANEOUS at 12:26

## 2022-03-25 RX ADMIN — SODIUM CHLORIDE 3 MILLILITER(S): 9 INJECTION INTRAMUSCULAR; INTRAVENOUS; SUBCUTANEOUS at 05:02

## 2022-03-25 RX ADMIN — PANTOPRAZOLE SODIUM 40 MILLIGRAM(S): 20 TABLET, DELAYED RELEASE ORAL at 05:03

## 2022-03-25 RX ADMIN — Medication 100 MILLIEQUIVALENT(S): at 16:14

## 2022-03-25 NOTE — PROGRESS NOTE ADULT - ASSESSMENT
Hematology/Oncology consulted on this 72 year old female with history of HTN, Hyperlipidemia, HARIS on CPAP, pericarditis, migraine, depression, fibromyalgia, GERD, prior COVID-19, and Afib on Eliquis s/p  Afib ablation on 3/10/2022, known to [facility] and follows with Dr Garcia for easy bruising who presented to the ED at [facility] with complaints of chest pain. Her outpatient work up for  bleeding disorder was negative.     ACS evaluation  --Atypical chest pain  -- Troponins are negative    -- No ischemic changes noted per cardiology  --TTE given patient's recent ablation procedure, nml  --CTA neg for aortic dissection  -- no need to treat for ACS at this time per cards  --appreciate recs    odynophagia  --on Carafate and PPI  --EGD planned for today          Patient may follow with Dr Garcia at [facility] after discharge    Carrie Mims NP  Hematology/Oncology  [facility]  [phone] (Office)  [phone] (Alt office)  Evenings and weekends please call MD on call or office

## 2022-03-25 NOTE — PROGRESS NOTE ADULT - ASSESSMENT
This is a 72F with history of AFib (on eliquis, recent ablation on 3/10/22 by Dr. Corley), HTN, HLD, HARIS on CPAP, and Depression/Anxiety, who presents to the hospital with worsening chest/back pain and new onset odynophagia since her recent AFib ablation.

## 2022-03-25 NOTE — ASU PREOP CHECKLIST - AS BP NONINV METHOD
Problem: KNOWLEDGE DEFICIT  Goal: Patient/S.O. demonstrates understanding of disease process, treatment plan, medications, and discharge instructions.   Outcome: Completed Date Met: 03/09/18 electronic

## 2022-03-25 NOTE — PROGRESS NOTE ADULT - NS ATTEND AMEND GEN_ALL_CORE FT
I saw and evaluated the patient and discussed the care with the advanced care practitioner, on the above date.  I agree with the findings and plan as documented in the note above with the following addendum:    The patient does not have an acute meeting for her clinical symptoms. Cardiology recommendation reviewed and acs is unlikely. Patient to have endoscopy today to potential identify non - cardiac etiology of chest pain that can present in a similar manner.  Thank you for the consultation    Lindsey Malhotra MD  Hematology/Oncology  [phone]
This is a 72 year old woman with a pmhx of HTN, HLD, HARIS (on CPAP QHS), pericarditis (aged 26 ad 30), migraine depression, Fibromyalgia, GERD, COVID-19, and symptomatic persistent Afib (on Eliquis and metoprolol) s/p recent Afib ablation on 3/10/2022 and discharge on colchicine who presented today with worsening CP that radiated down the left arm and back. Patient rated the pain as a 9/10 which lasted for 2-3 hours. It was associated with dizziness, SOB, nausea, and chills. Differential of chest pain included pericarditis, dissection, esophageal issues, ischemic. CT of chest unrevealing. Continue Eliquis 5 mg po BID for thromboembolic ppx  given that patient has a [id number] score of 3 (age, sex, HTN). Continue PPI pantoprazole, Colchicine. GI following. EKG reviewed; QTc 458msec.
This is a 72 year old woman with a pmhx of HTN, HLD, HARIS (on CPAP QHS), pericarditis (aged 27 ad 30), migraine depression, Fibromyalgia, GERD, COVID-19, and symptomatic persistent Afib (on Eliquis and metoprolol) s/p recent Afib ablation on 3/10/2022 and discharge on colchicine who presented today with worsening CP that radiated down the left arm and back. Patient rated the pain as a 9/10 which lasted for 2-3 hours. It was associated with dizziness, SOB, nausea, and chills. Pending TTE , CTA negative. Continue Eliquis 5 mg po BID for thromboembolic ppx  given that patient has a [id number] score of 3 (age, sex, HTN). Continue PPI pantoprazole, Colchicine. EKG today revealed SR at 65bpm, nonspecific T wave abnormality, SC 170ms, QT/QTc 390/405ms ( QT/QTc 612/595 ms on EKG 3/23), patient is not taking qtc prolonging meds.

## 2022-03-25 NOTE — PROGRESS NOTE ADULT - ASSESSMENT
This is a 72 year old woman with a pmhx of HTN, HLD, HARIS (on CPAP QHS), pericarditis (aged 27 ad 30), migraine depression, Fibromyalgia, GERD, COVID-19, and symptomatic persistent Afib (on Eliquis and metoprolol) s/p recent Afib ablation on 3/10/2022 and discharge on colchicine who presented today with worsening CP that radiated down the left arm and back. Patient rated the pain as a 9/10 which lasted for 2-3 hours. It was associated with dizziness, SOB, nausea, and chills.       - No overnight event, denies CP, SOB, Palpitation.  - Continuous telemetric monitoring  - Monitor electrolytes and replete K to 4 and Mg to 2  - Continue Eliquis 5 mg po BID for thromboembolic ppx  given that patient has a [id number] score of 3 (age, sex, HTN)  - Continue PPI pantoprazole, Colchicine  - EKG reviewed; QTc 458msec

## 2022-03-25 NOTE — PROGRESS NOTE ADULT - NS ATTEND OPT1 GEN_ALL_CORE
I independently performed the documented:
I attest my time as attending is greater than 50% of the total combined time spent on qualifying patient care activities by the PA/NP and attending.
I independently performed the documented:

## 2022-03-26 ENCOUNTER — TRANSCRIPTION ENCOUNTER (OUTPATIENT)
Age: 72
End: 2022-03-26

## 2022-03-26 LAB
ANION GAP SERPL CALC-SCNC: 13 MMOL/L — SIGNIFICANT CHANGE UP (ref 7–14)
BUN SERPL-MCNC: 13 MG/DL — SIGNIFICANT CHANGE UP (ref 7–23)
CALCIUM SERPL-MCNC: 9.4 MG/DL — SIGNIFICANT CHANGE UP (ref 8.4–10.5)
CHLORIDE SERPL-SCNC: 103 MMOL/L — SIGNIFICANT CHANGE UP (ref 98–107)
CO2 SERPL-SCNC: 21 MMOL/L — LOW (ref 22–31)
CREAT SERPL-MCNC: 0.79 MG/DL — SIGNIFICANT CHANGE UP (ref 0.5–1.3)
EGFR: 79 ML/MIN/1.73M2 — SIGNIFICANT CHANGE UP
GLUCOSE SERPL-MCNC: 122 MG/DL — HIGH (ref 70–99)
HCT VFR BLD CALC: 40.6 % — SIGNIFICANT CHANGE UP (ref 34.5–45)
HGB BLD-MCNC: 13.8 G/DL — SIGNIFICANT CHANGE UP (ref 11.5–15.5)
MAGNESIUM SERPL-MCNC: 1.9 MG/DL — SIGNIFICANT CHANGE UP (ref 1.6–2.6)
MCHC RBC-ENTMCNC: 29.9 PG — SIGNIFICANT CHANGE UP (ref 27–34)
MCHC RBC-ENTMCNC: 34 GM/DL — SIGNIFICANT CHANGE UP (ref 32–36)
MCV RBC AUTO: 87.9 FL — SIGNIFICANT CHANGE UP (ref 80–100)
NRBC # BLD: 0 /100 WBCS — SIGNIFICANT CHANGE UP
NRBC # FLD: 0 K/UL — SIGNIFICANT CHANGE UP
PHOSPHATE SERPL-MCNC: 3.4 MG/DL — SIGNIFICANT CHANGE UP (ref 2.5–4.5)
PLATELET # BLD AUTO: 308 K/UL — SIGNIFICANT CHANGE UP (ref 150–400)
POTASSIUM SERPL-MCNC: 3.7 MMOL/L — SIGNIFICANT CHANGE UP (ref 3.5–5.3)
POTASSIUM SERPL-SCNC: 3.7 MMOL/L — SIGNIFICANT CHANGE UP (ref 3.5–5.3)
RBC # BLD: 4.62 M/UL — SIGNIFICANT CHANGE UP (ref 3.8–5.2)
RBC # FLD: 12.7 % — SIGNIFICANT CHANGE UP (ref 10.3–14.5)
SODIUM SERPL-SCNC: 137 MMOL/L — SIGNIFICANT CHANGE UP (ref 135–145)
WBC # BLD: 6.43 K/UL — SIGNIFICANT CHANGE UP (ref 3.8–10.5)
WBC # FLD AUTO: 6.43 K/UL — SIGNIFICANT CHANGE UP (ref 3.8–10.5)

## 2022-03-26 PROCEDURE — 99232 SBSQ HOSP IP/OBS MODERATE 35: CPT

## 2022-03-26 RX ORDER — LIDOCAINE 4 G/100G
15 CREAM TOPICAL EVERY 6 HOURS
Refills: 0 | Status: DISCONTINUED | OUTPATIENT
Start: 2022-03-26 | End: 2022-03-27

## 2022-03-26 RX ORDER — POTASSIUM CHLORIDE 20 MEQ
10 PACKET (EA) ORAL
Refills: 0 | Status: DISCONTINUED | OUTPATIENT
Start: 2022-03-26 | End: 2022-03-26

## 2022-03-26 RX ORDER — POTASSIUM CHLORIDE 20 MEQ
20 PACKET (EA) ORAL ONCE
Refills: 0 | Status: COMPLETED | OUTPATIENT
Start: 2022-03-26 | End: 2022-03-26

## 2022-03-26 RX ORDER — MAGNESIUM SULFATE 500 MG/ML
2 VIAL (ML) INJECTION ONCE
Refills: 0 | Status: COMPLETED | OUTPATIENT
Start: 2022-03-26 | End: 2022-03-26

## 2022-03-26 RX ADMIN — Medication 10 MILLIGRAM(S): at 12:31

## 2022-03-26 RX ADMIN — APIXABAN 5 MILLIGRAM(S): 2.5 TABLET, FILM COATED ORAL at 18:13

## 2022-03-26 RX ADMIN — Medication 0.6 MILLIGRAM(S): at 18:13

## 2022-03-26 RX ADMIN — Medication 12.5 MILLIGRAM(S): at 06:07

## 2022-03-26 RX ADMIN — PANTOPRAZOLE SODIUM 40 MILLIGRAM(S): 20 TABLET, DELAYED RELEASE ORAL at 18:13

## 2022-03-26 RX ADMIN — ATORVASTATIN CALCIUM 20 MILLIGRAM(S): 80 TABLET, FILM COATED ORAL at 21:06

## 2022-03-26 RX ADMIN — PANTOPRAZOLE SODIUM 40 MILLIGRAM(S): 20 TABLET, DELAYED RELEASE ORAL at 06:11

## 2022-03-26 RX ADMIN — SODIUM CHLORIDE 3 MILLILITER(S): 9 INJECTION INTRAMUSCULAR; INTRAVENOUS; SUBCUTANEOUS at 06:44

## 2022-03-26 RX ADMIN — SODIUM CHLORIDE 3 MILLILITER(S): 9 INJECTION INTRAMUSCULAR; INTRAVENOUS; SUBCUTANEOUS at 13:39

## 2022-03-26 RX ADMIN — Medication 1 GRAM(S): at 18:13

## 2022-03-26 RX ADMIN — Medication 25 GRAM(S): at 10:04

## 2022-03-26 RX ADMIN — APIXABAN 5 MILLIGRAM(S): 2.5 TABLET, FILM COATED ORAL at 06:06

## 2022-03-26 RX ADMIN — Medication 1 GRAM(S): at 12:30

## 2022-03-26 RX ADMIN — LOSARTAN POTASSIUM 100 MILLIGRAM(S): 100 TABLET, FILM COATED ORAL at 06:07

## 2022-03-26 RX ADMIN — Medication 20 MILLIEQUIVALENT(S): at 10:04

## 2022-03-26 RX ADMIN — SODIUM CHLORIDE 3 MILLILITER(S): 9 INJECTION INTRAMUSCULAR; INTRAVENOUS; SUBCUTANEOUS at 22:00

## 2022-03-26 RX ADMIN — Medication 0.6 MILLIGRAM(S): at 06:06

## 2022-03-26 RX ADMIN — SENNA PLUS 2 TABLET(S): 8.6 TABLET ORAL at 21:06

## 2022-03-26 RX ADMIN — Medication 1 GRAM(S): at 06:06

## 2022-03-26 RX ADMIN — Medication 360 MILLIGRAM(S): at 12:31

## 2022-03-26 NOTE — PROGRESS NOTE ADULT - ASSESSMENT
Impression:  71 yo F w/ PMHx HTN, HLD, HARIS, GERD, afib w/ recent ablation (3/10/22) presenting w/ chest pain since procedure. GI consulted for odynophagia since the procedure    # chest pain - being evaluated by cardiology, troponins negative, CTA negative, awaiting TTE  # odynophagia - developed post procedure; EGD on 3/25/22 demonstrating esophageal ulcer in med esophagus 2/2 to thermal injury to the esophagus.    Recommendations:  -advance diet as tolerated  -Slurry Carafate 1 gm every 6hrs x 2 weeks    No further work up from our perspective.      Recommendations preliminary until signed by attending.     Daniel Gordillo MD  Gastroenterology/Hepatology Fellow  Contact on-call GI fellow via answering service ([phone]) from 5pm-8am AND on weekends/holidays         Impression:  73 yo F w/ PMHx HTN, HLD, HARIS, GERD, afib w/ recent ablation (3/10/22) presenting w/ chest pain since procedure. GI consulted for odynophagia since the procedure    # chest pain - being evaluated by cardiology, troponins negative, CTA negative, awaiting TTE  # odynophagia - developed post procedure; EGD on 3/25/22 demonstrating esophageal ulcer in med esophagus 2/2 to thermal injury to the esophagus.    Recommendations:  -advance diet as tolerated  -Slurry Carafate 1 gm every 6hrs x 2 weeks  -PPI BID x 2 weeks  -Viscous Lidocaine 2% q6H PRN for analgesic     No further work up from our perspective.      Recommendations preliminary until signed by attending.     Daniel Gordillo MD  Gastroenterology/Hepatology Fellow  Contact on-call GI fellow via answering service ([phone]) from 5pm-8am AND on weekends/holidays

## 2022-03-26 NOTE — DISCHARGE NOTE PROVIDER - NSDCFUADDAPPT_GEN_ALL_CORE_FT
please follow up with your primary care doctor this week     follow up with gastroenterology - will need repeat EGD/ colonoscopy in 4-8 weeks to ensure healing of ulcer / biopsy results

## 2022-03-26 NOTE — PROGRESS NOTE ADULT - ATTENDING COMMENTS
s/p EGD yesterday -> found to have mid esophageal ulcer likely 2/2 ablation (approx 20% circumferential)  Improved after starting carafate, but otherwise still with discomfort  Reviewed with patient results and likely need for 3-14 days for healing    - c/w liquid carafate 4x/day  - BID PPI x 2 weeks  - Viscous Lidocaine Q2 hour PRN    Reasonable for repeat EGD to ensure healing in 4-8 weeks

## 2022-03-26 NOTE — DISCHARGE NOTE PROVIDER - NSDCFUSCHEDAPPT_GEN_ALL_CORE_FT
EMRE KIM ; 03/27/2022 ; NPP [facility] [address]  EMRE KIM ; 04/14/2022 ; NP Cardio [facility] [id number] EMRE KIM ; 06/01/2022 ; NPP [facility] [address]

## 2022-03-26 NOTE — DISCHARGE NOTE PROVIDER - NSDCCPCAREPLAN_GEN_ALL_CORE_FT
PRINCIPAL DISCHARGE DIAGNOSIS  Diagnosis: Pericarditis  Assessment and Plan of Treatment: continue with colchicine - to complete your 14 day course   continue to take protonix      SECONDARY DISCHARGE DIAGNOSES  Diagnosis: Odynophagia  Assessment and Plan of Treatment: endoscopy performed . you were found to have esophageal ulcer and polyps   - please continue protonix twice daily and carafate   follow up with gastroenterology for biopsy results

## 2022-03-26 NOTE — DISCHARGE NOTE PROVIDER - CARE PROVIDER_API CALL
Chandra Ibarra)  Gastroenterology; Internal Medicine  [address] [place] [address]  Phone: [phone]  Fax: [phone]  Follow Up Time: 2 weeks

## 2022-03-26 NOTE — DISCHARGE NOTE PROVIDER - HOSPITAL COURSE
72F with history of AFib on eliquis, recent ablation on 3/10/22 by Dr. Corley, HTN, HLD, HARIS on CPAP, and Depression/Anxiety, who presents to the hospital with worsening chest/back pain and new onset odynophagia since her recent AFib ablation.     Chest pain.   - Atypical chest pain, likely GI etiology   - Troponin negative   - No ischemic changes on EKG   - CTA C/A/P negative for dissection   - recent TTE 3/23 w/ grossly normal LV systolic function, normal RV, no pericardial effusion   - echo ?????????????????????????????????????????????????????????????????????????????????????  - c/w PPI and Carafate      Odynophagia.    Patient able to tolerate liquids, states that she has pain with solid food intake  -egd - - Non-bleeding mid esophageal ulcer, c/w source of odynophagia.  Multiple gastric fundus and gastric body polyps, concerning for fundic gland polyp. One non-bleeding angioectasia in the duodenum. Incidental finding- no treatment. Recommendation:  Sucralfate (slurry) 1 g every 6 hrs for 2 weeks.  - continue with protonix   - diet as tolerated - currently on soft     Chronic atrial fibrillation.    - s/p recent ablation, currently in sinus rhythm   - c/w Eliquis given her elevated CHADSVASC score  - c/w Diltiazem, reports allergy to Metoprolol.     Hypertension.    - Low salt diet.  - Continue Cardizem, HCTZ, Losartan     Hyperlipidemia.   - Continue Statin.    HARIS on CPAP.   - Continue CPAP setting 10 QHS.     H/O pericarditis.   - Continue Colchicine po BID x 14 more days as per cardiology.     Other depression.   - Not on any SSRI or antidepressant meds.     Migraine.   - Tylenol po PRN.    GERD    Currently on PPI        72F with history of AFib on eliquis, recent ablation on 3/10/22 by Dr. Corley, HTN, HLD, HARIS on CPAP, and Depression/Anxiety, who presents to the hospital with worsening chest/back pain and new onset odynophagia since her recent AFib ablation.     Chest pain.   - Atypical chest pain, likely GI etiology   - Troponin negative   - No ischemic changes on EKG   - CTA C/A/P negative for dissection   - recent TTE 3/23 w/ grossly normal LV systolic function, normal RV, no pericardial effusion   - echo ?????????????????????????????????????????????????????????????????????????????????????  - c/w PPI and Carafate      Odynophagia.    Patient able to tolerate liquids, states that she has pain with solid food intake  -egd - - Non-bleeding mid esophageal ulcer, c/w source of odynophagia.  Multiple gastric fundus and gastric body polyps, concerning for fundic gland polyp. One non-bleeding angioectasia in the duodenum. Incidental finding- no treatment. Recommendation:  Sucralfate (slurry) 1 g every 6 hrs for 2 weeks.  - continue with protonix bid x 2 weeks and  Viscous Lidocaine Q6 hour PRN  - diet as tolerated - currently on soft     Chronic atrial fibrillation.    - s/p recent ablation, currently in sinus rhythm   - c/w Eliquis given her elevated CHADSVASC score  - c/w Diltiazem, reports allergy to Metoprolol.     Hypertension.    - Low salt diet.  - Continue Cardizem, HCTZ, Losartan     Hyperlipidemia.   - Continue Statin.    HARIS on CPAP.   - Continue CPAP setting 10 QHS.     H/O pericarditis.   - Continue Colchicine po BID x 14 more days as per cardiology.     Other depression.   - Not on any SSRI or antidepressant meds.     Migraine.   - Tylenol po PRN.    GERD    Currently on PPI        72F with history of AFib on eliquis, recent ablation on 3/10/22 by Dr. Corley, HTN, HLD, HARIS on CPAP, and Depression/Anxiety, who presents to the hospital with worsening chest/back pain and new onset odynophagia since her recent AFib ablation.     Chest pain.   - Atypical chest pain, likely GI etiology   - Troponin negative   - No ischemic changes on EKG   - CTA C/A/P negative for dissection   - recent TTE 3/23 w/ grossly normal LV systolic function, normal RV, no pericardial effusion   - echo ?????????????????????????????????????????????????????????????????????????????????????  - c/w PPI and Carafate      Odynophagia.    Patient able to tolerate liquids, states that she has pain with solid food intake  -egd - - Non-bleeding mid esophageal ulcer, c/w source of odynophagia.  Multiple gastric fundus and gastric body polyps, concerning for fundic gland polyp. One non-bleeding angioectasia in the duodenum. Incidental finding- no treatment. Recommendation:  Sucralfate (slurry) 1 g every 6 hrs for 2 weeks.  - continue with protonix bid x 2 weeks and  Viscous Lidocaine Q6 hour PRN  - repeat EGD to ensure healing in 4-8 weeks .  - diet as tolerated - currently on soft     Chronic atrial fibrillation.    - s/p recent ablation, currently in sinus rhythm   - c/w Eliquis given her elevated CHADSVASC score  - c/w Diltiazem, reports allergy to Metoprolol.     Hypertension.    - Low salt diet.  - Continue Cardizem, HCTZ, Losartan     Hyperlipidemia.   - Continue Statin.    HARIS on CPAP.   - Continue CPAP setting 10 QHS.     H/O pericarditis.   - Continue Colchicine po BID x 14 more days as per cardiology.     Other depression.   - Not on any SSRI or antidepressant meds.     Migraine.   - Tylenol po PRN.    GERD    Currently on PPI        72F with history of AFib on eliquis, recent ablation on 3/10/22 by Dr. Corley, HTN, HLD, HARIS on CPAP, and Depression/Anxiety, who presents to the hospital with worsening chest/back pain and new onset odynophagia since her recent AFib ablation.     Chest pain.   - Atypical chest pain, likely GI etiology   - Troponin negative   - No ischemic changes on EKG   - CTA C/A/P negative for dissection   - recent TTE 3/23 w/ grossly normal LV systolic function, normal RV, no pericardial effusion   - c/w PPI and Carafate      Odynophagia.    Patient able to tolerate liquids, states that she has pain with solid food intake  -egd - - Non-bleeding mid esophageal ulcer, c/w source of odynophagia.  Multiple gastric fundus and gastric body polyps, concerning for fundic gland polyp. One non-bleeding angioectasia in the duodenum. Incidental finding- no treatment. Recommendation:  Sucralfate (slurry) 1 g every 6 hrs for 2 weeks.  - continue with protonix bid x 2 weeks and  Viscous Lidocaine Q6 hour PRN  - repeat EGD to ensure healing in 4-8 weeks .  - diet as tolerated - currently on soft     Chronic atrial fibrillation.    - s/p recent ablation, currently in sinus rhythm   - c/w Eliquis given her elevated CHADSVASC score  - c/w Diltiazem, reports allergy to Metoprolol.  - follow up with EP      Hypertension.    - Low salt diet.  - Continue Cardizem, HCTZ, Losartan     Hyperlipidemia.   - Continue Statin.    HARIS on CPAP.   - Continue CPAP setting 10 QHS.     H/O pericarditis.   - Continue Colchicine po BID x 14 more days as per cardiology.     Other depression.   - Not on any SSRI or antidepressant meds.     Migraine.   - Tylenol po PRN.    GERD    Currently on PPI        72F with history of AFib on eliquis, recent ablation on 3/10/22 by Dr. Corley, HTN, HLD, HARIS on CPAP, and Depression/Anxiety, who presents to the hospital with worsening chest/back pain and new onset odynophagia since her recent AFib ablation.     Chest pain.   - Atypical chest pain due to esophageal ulcer   - Troponin negative   - No ischemic changes on EKG   - CTA C/A/P negative for dissection   - recent TTE 3/23 w/ grossly normal LV systolic function, normal RV, no pericardial effusion   - c/w PPI and Carafate      Odynophagia.    Patient able to tolerate liquids, states that she has pain with solid food intake  -EGD - Non-bleeding mid esophageal ulcer, c/w source of odynophagia.  Multiple gastric fundus and gastric body polyps, concerning for fundic gland polyp. One non-bleeding angioectasia in the duodenum. Incidental finding- no treatment. Recommendation:  Sucralfate (slurry) 1 g every 6 hrs for 2 weeks.  - continue with protonix bid x 2 weeks and  Viscous Lidocaine Q6 hour PRN  - repeat EGD to ensure healing in 4-8 weeks .  - diet as tolerated - currently on soft     Chronic atrial fibrillation.    - s/p recent ablation, currently in sinus rhythm   - c/w Eliquis given her elevated CHADSVASC score  - c/w Diltiazem, reports allergy to Metoprolol.  - follow up with EP      Hypertension.    - Low salt diet.  - Continue Cardizem, HCTZ, Losartan     Hyperlipidemia.   - Continue Statin.    HARIS on CPAP.   - Continue CPAP setting 10 QHS.     H/O pericarditis.   - Continue Colchicine po BID x 14 more days as per cardiology.     Other depression.   - Not on any SSRI or antidepressant meds.     Migraine.   - Tylenol po PRN.    GERD    Currently on PPI

## 2022-03-26 NOTE — PROGRESS NOTE ADULT - ASSESSMENT
This is a 72 year old woman with a pmhx of HTN, HLD, HARIS (on CPAP QHS), pericarditis (aged 26 ad 30), migraine depression, Fibromyalgia, GERD, COVID-19, and symptomatic persistent Afib (on Eliquis and metoprolol) s/p recent Afib ablation on 3/10/2022 and discharge on colchicine who presented today with worsening CP that radiated down the left arm and back. Patient rated the pain as a 9/10 which lasted for 2-3 hours. It was associated with dizziness, SOB, nausea, and chills. Continue Eliquis 5 mg po BID for thromboembolic ppx  given that patient has a [id number] score of 3 (age, sex, HTN). Continue PPI pantoprazole, Colchicine. EKG reviewed; QTc 458msec

## 2022-03-26 NOTE — DISCHARGE NOTE PROVIDER - NSDCMRMEDTOKEN_GEN_ALL_CORE_FT
atorvastatin 20 mg oral tablet: 1 tab(s) orally once a day  colchicine 0.6 mg oral tablet: 1 tab(s) orally 2 times a day with your first dose to be taken tomorrow 3/11 in the morning.   DilTIAZem (Eqv-Tiazac) 360 mg/24 hours oral capsule, extended release: 1 cap(s) orally once a day  docusate sodium 100 mg oral tablet: 1 tab(s) orally 2 times a day, As Needed  Eliquis 5 mg oral tablet: 1 tab(s) orally 2 times a day  losartan-hydrochlorothiazide 100 mg-12.5 mg oral tablet: 1 tab(s) orally once a day PM  oxybutynin 10 mg/24 hr oral tablet, extended release: 1 tab(s) orally once a day  pantoprazole 40 mg oral delayed release tablet: 1 tab(s) orally once a day PM   acetaminophen 325 mg oral tablet: 2 tab(s) orally every 6 hours, As needed, Temp greater or equal to 38C (100.4F), Mild Pain (1 - 3), Moderate Pain (4 - 6)  apixaban 5 mg oral tablet: 1 tab(s) orally every 12 hours  atorvastatin 20 mg oral tablet: 1 tab(s) orally once a day (at bedtime)  colchicine 0.6 mg oral tablet: 1 tab(s) orally every 12 hours  dilTIAZem 360 mg/24 hours oral capsule, extended release: 1 cap(s) orally once a day  docusate sodium 100 mg oral tablet: 1 tab(s) orally 2 times a day, As Needed  lidocaine 2% mucous membrane solution: 15 milliliter(s) mucous membrane every 6 hours, As Needed for ulcer symptoms   losartan-hydrochlorothiazide 100 mg-12.5 mg oral tablet: 1 tab(s) orally once a day PM  oxybutynin 10 mg/24 hr oral tablet, extended release: 1 tab(s) orally once a day  Protonix 40 mg oral granule, delayed release: 1 each orally 2 times a day   senna oral tablet: 2 tab(s) orally once a day (at bedtime)  sucralfate 1 g/10 mL oral suspension: 10 milliliter(s) orally every 6 hours   acetaminophen 325 mg oral tablet: 2 tab(s) orally every 6 hours, As needed, Temp greater or equal to 38C (100.4F), Mild Pain (1 - 3), Moderate Pain (4 - 6)  apixaban 5 mg oral tablet: 1 tab(s) orally every 12 hours  atorvastatin 20 mg oral tablet: 1 tab(s) orally once a day (at bedtime)  colchicine 0.6 mg oral tablet: 1 tab(s) orally every 12 hours  dilTIAZem 360 mg/24 hours oral capsule, extended release: 1 cap(s) orally once a day  docusate sodium 100 mg oral tablet: 1 tab(s) orally 2 times a day, As Needed  lidocaine 2% mucous membrane solution: 15 milliliter(s) mucous membrane every 6 hours, As Needed for ulcer symptoms   losartan-hydrochlorothiazide 100 mg-12.5 mg oral tablet: 1 tab(s) orally once a day PM  oxybutynin 10 mg/24 hr oral tablet, extended release: 1 tab(s) orally once a day  physical therapy: please evaluate for physical therapy   Protonix 40 mg oral granule, delayed release: 1 each orally 2 times a day   senna oral tablet: 2 tab(s) orally once a day (at bedtime)  sucralfate 1 g/10 mL oral suspension: 10 milliliter(s) orally every 6 hours   acetaminophen 325 mg oral tablet: 2 tab(s) orally every 6 hours, As needed, Temp greater or equal to 38C (100.4F), Mild Pain (1 - 3), Moderate Pain (4 - 6)  apixaban 5 mg oral tablet: 1 tab(s) orally every 12 hours  atorvastatin 20 mg oral tablet: 1 tab(s) orally once a day (at bedtime)  atorvastatin 20 mg oral tablet: 1 tab(s) orally once a day (at bedtime)  colchicine 0.6 mg oral tablet: 1 tab(s) orally every 12 hours with meals   dilTIAZem 360 mg/24 hours oral capsule, extended release: 1 cap(s) orally once a day  docusate sodium 100 mg oral tablet: 1 tab(s) orally 2 times a day, As Needed  lidocaine 2% mucous membrane solution: 15 milliliter(s) mucous membrane every 6 hours, As Needed for ulcer symptoms   losartan-hydrochlorothiazide 100 mg-12.5 mg oral tablet: 1 tab(s) orally once a day PM  oxybutynin 10 mg/24 hr oral tablet, extended release: 1 tab(s) orally once a day  physical therapy: please evaluate for physical therapy   Protonix 40 mg oral granule, delayed release: 1 each orally 2 times a day   senna oral tablet: 2 tab(s) orally once a day (at bedtime)  sucralfate 1 g/10 mL oral suspension: 10 milliliter(s) orally every 6 hours   acetaminophen 325 mg oral tablet: 2 tab(s) orally every 6 hours, As needed, Temp greater or equal to 38C (100.4F), Mild Pain (1 - 3), Moderate Pain (4 - 6)  apixaban 5 mg oral tablet: 1 tab(s) orally every 12 hours  atorvastatin 20 mg oral tablet: 1 tab(s) orally once a day (at bedtime)  colchicine 0.6 mg oral tablet: 1 tab(s) orally every 12 hours with meals   dilTIAZem 360 mg/24 hours oral capsule, extended release: 1 cap(s) orally once a day  docusate sodium 100 mg oral tablet: 1 tab(s) orally 2 times a day, As Needed  lidocaine 2% mucous membrane solution: 15 milliliter(s) mucous membrane every 6 hours, As Needed for ulcer symptoms   losartan-hydrochlorothiazide 100 mg-12.5 mg oral tablet: 1 tab(s) orally once a day PM  oxybutynin 10 mg/24 hr oral tablet, extended release: 1 tab(s) orally once a day  physical therapy: please evaluate for physical therapy   Protonix 40 mg oral granule, delayed release: 1 each orally 2 times a day   senna oral tablet: 2 tab(s) orally once a day (at bedtime)  sucralfate 1 g/10 mL oral suspension: 10 milliliter(s) orally every 6 hours

## 2022-03-27 ENCOUNTER — APPOINTMENT (OUTPATIENT)
Dept: SLEEP CENTER | Facility: CLINIC | Age: 72
End: 2022-03-27

## 2022-03-27 ENCOUNTER — TRANSCRIPTION ENCOUNTER (OUTPATIENT)
Age: 72
End: 2022-03-27

## 2022-03-27 VITALS
HEART RATE: 60 BPM | DIASTOLIC BLOOD PRESSURE: 65 MMHG | SYSTOLIC BLOOD PRESSURE: 122 MMHG | OXYGEN SATURATION: 98 % | TEMPERATURE: 98 F | RESPIRATION RATE: 16 BRPM

## 2022-03-27 LAB
ANION GAP SERPL CALC-SCNC: 15 MMOL/L — HIGH (ref 7–14)
BUN SERPL-MCNC: 14 MG/DL — SIGNIFICANT CHANGE UP (ref 7–23)
CALCIUM SERPL-MCNC: 9.3 MG/DL — SIGNIFICANT CHANGE UP (ref 8.4–10.5)
CHLORIDE SERPL-SCNC: 102 MMOL/L — SIGNIFICANT CHANGE UP (ref 98–107)
CO2 SERPL-SCNC: 21 MMOL/L — LOW (ref 22–31)
CREAT SERPL-MCNC: 0.78 MG/DL — SIGNIFICANT CHANGE UP (ref 0.5–1.3)
EGFR: 80 ML/MIN/1.73M2 — SIGNIFICANT CHANGE UP
GLUCOSE SERPL-MCNC: 113 MG/DL — HIGH (ref 70–99)
HCT VFR BLD CALC: 40.1 % — SIGNIFICANT CHANGE UP (ref 34.5–45)
HGB BLD-MCNC: 13.1 G/DL — SIGNIFICANT CHANGE UP (ref 11.5–15.5)
MAGNESIUM SERPL-MCNC: 2.1 MG/DL — SIGNIFICANT CHANGE UP (ref 1.6–2.6)
MCHC RBC-ENTMCNC: 29 PG — SIGNIFICANT CHANGE UP (ref 27–34)
MCHC RBC-ENTMCNC: 32.7 GM/DL — SIGNIFICANT CHANGE UP (ref 32–36)
MCV RBC AUTO: 88.9 FL — SIGNIFICANT CHANGE UP (ref 80–100)
NRBC # BLD: 0 /100 WBCS — SIGNIFICANT CHANGE UP
NRBC # FLD: 0 K/UL — SIGNIFICANT CHANGE UP
PHOSPHATE SERPL-MCNC: 3.5 MG/DL — SIGNIFICANT CHANGE UP (ref 2.5–4.5)
PLATELET # BLD AUTO: 254 K/UL — SIGNIFICANT CHANGE UP (ref 150–400)
POTASSIUM SERPL-MCNC: 3.4 MMOL/L — LOW (ref 3.5–5.3)
POTASSIUM SERPL-SCNC: 3.4 MMOL/L — LOW (ref 3.5–5.3)
RBC # BLD: 4.51 M/UL — SIGNIFICANT CHANGE UP (ref 3.8–5.2)
RBC # FLD: 12.6 % — SIGNIFICANT CHANGE UP (ref 10.3–14.5)
SODIUM SERPL-SCNC: 138 MMOL/L — SIGNIFICANT CHANGE UP (ref 135–145)
WBC # BLD: 5.29 K/UL — SIGNIFICANT CHANGE UP (ref 3.8–10.5)
WBC # FLD AUTO: 5.29 K/UL — SIGNIFICANT CHANGE UP (ref 3.8–10.5)

## 2022-03-27 PROCEDURE — 99239 HOSP IP/OBS DSCHRG MGMT >30: CPT

## 2022-03-27 PROCEDURE — 99232 SBSQ HOSP IP/OBS MODERATE 35: CPT | Mod: GC

## 2022-03-27 RX ORDER — DILTIAZEM HCL 120 MG
1 CAPSULE, EXT RELEASE 24 HR ORAL
Qty: 0 | Refills: 0 | DISCHARGE

## 2022-03-27 RX ORDER — OXYBUTYNIN CHLORIDE 5 MG
1 TABLET ORAL
Qty: 0 | Refills: 0 | DISCHARGE
Start: 2022-03-27

## 2022-03-27 RX ORDER — COLCHICINE 0.6 MG
1 TABLET ORAL
Qty: 22 | Refills: 0
Start: 2022-03-27 | End: 2022-04-06

## 2022-03-27 RX ORDER — PANTOPRAZOLE SODIUM 20 MG/1
1 TABLET, DELAYED RELEASE ORAL
Qty: 60 | Refills: 0
Start: 2022-03-27 | End: 2022-04-25

## 2022-03-27 RX ORDER — DILTIAZEM HCL 120 MG
1 CAPSULE, EXT RELEASE 24 HR ORAL
Qty: 0 | Refills: 0 | DISCHARGE
Start: 2022-03-27

## 2022-03-27 RX ORDER — APIXABAN 2.5 MG/1
1 TABLET, FILM COATED ORAL
Qty: 0 | Refills: 0 | DISCHARGE
Start: 2022-03-27

## 2022-03-27 RX ORDER — ACETAMINOPHEN 500 MG
2 TABLET ORAL
Qty: 0 | Refills: 0 | DISCHARGE
Start: 2022-03-27

## 2022-03-27 RX ORDER — OXYBUTYNIN CHLORIDE 5 MG
1 TABLET ORAL
Qty: 0 | Refills: 0 | DISCHARGE

## 2022-03-27 RX ORDER — COLCHICINE 0.6 MG
1 TABLET ORAL
Qty: 0 | Refills: 0 | DISCHARGE
Start: 2022-03-27

## 2022-03-27 RX ORDER — PANTOPRAZOLE SODIUM 20 MG/1
1 TABLET, DELAYED RELEASE ORAL
Qty: 0 | Refills: 0 | DISCHARGE

## 2022-03-27 RX ORDER — APIXABAN 2.5 MG/1
1 TABLET, FILM COATED ORAL
Qty: 0 | Refills: 0 | DISCHARGE

## 2022-03-27 RX ORDER — ATORVASTATIN CALCIUM 80 MG/1
1 TABLET, FILM COATED ORAL
Qty: 0 | Refills: 0 | DISCHARGE
Start: 2022-03-27

## 2022-03-27 RX ORDER — SENNA PLUS 8.6 MG/1
2 TABLET ORAL
Qty: 0 | Refills: 0 | DISCHARGE
Start: 2022-03-27

## 2022-03-27 RX ORDER — ATORVASTATIN CALCIUM 80 MG/1
1 TABLET, FILM COATED ORAL
Qty: 0 | Refills: 0 | DISCHARGE

## 2022-03-27 RX ORDER — POTASSIUM CHLORIDE 20 MEQ
40 PACKET (EA) ORAL EVERY 4 HOURS
Refills: 0 | Status: COMPLETED | OUTPATIENT
Start: 2022-03-27 | End: 2022-03-27

## 2022-03-27 RX ORDER — LIDOCAINE 4 G/100G
15 CREAM TOPICAL
Qty: 0 | Refills: 0 | DISCHARGE
Start: 2022-03-27

## 2022-03-27 RX ORDER — SUCRALFATE 1 G
10 TABLET ORAL
Qty: 1200 | Refills: 0
Start: 2022-03-27 | End: 2022-04-25

## 2022-03-27 RX ADMIN — PANTOPRAZOLE SODIUM 40 MILLIGRAM(S): 20 TABLET, DELAYED RELEASE ORAL at 05:23

## 2022-03-27 RX ADMIN — Medication 10 MILLIGRAM(S): at 13:07

## 2022-03-27 RX ADMIN — APIXABAN 5 MILLIGRAM(S): 2.5 TABLET, FILM COATED ORAL at 05:23

## 2022-03-27 RX ADMIN — SODIUM CHLORIDE 3 MILLILITER(S): 9 INJECTION INTRAMUSCULAR; INTRAVENOUS; SUBCUTANEOUS at 16:19

## 2022-03-27 RX ADMIN — Medication 1 GRAM(S): at 00:15

## 2022-03-27 RX ADMIN — Medication 40 MILLIEQUIVALENT(S): at 18:44

## 2022-03-27 RX ADMIN — Medication 0.6 MILLIGRAM(S): at 05:22

## 2022-03-27 RX ADMIN — Medication 12.5 MILLIGRAM(S): at 05:21

## 2022-03-27 RX ADMIN — Medication 1 GRAM(S): at 05:22

## 2022-03-27 RX ADMIN — Medication 1 GRAM(S): at 13:06

## 2022-03-27 RX ADMIN — Medication 360 MILLIGRAM(S): at 05:23

## 2022-03-27 RX ADMIN — Medication 0.6 MILLIGRAM(S): at 18:44

## 2022-03-27 RX ADMIN — LOSARTAN POTASSIUM 100 MILLIGRAM(S): 100 TABLET, FILM COATED ORAL at 05:21

## 2022-03-27 RX ADMIN — SODIUM CHLORIDE 3 MILLILITER(S): 9 INJECTION INTRAMUSCULAR; INTRAVENOUS; SUBCUTANEOUS at 06:00

## 2022-03-27 RX ADMIN — Medication 40 MILLIEQUIVALENT(S): at 13:10

## 2022-03-27 RX ADMIN — APIXABAN 5 MILLIGRAM(S): 2.5 TABLET, FILM COATED ORAL at 18:48

## 2022-03-27 RX ADMIN — Medication 1 GRAM(S): at 18:44

## 2022-03-27 RX ADMIN — PANTOPRAZOLE SODIUM 40 MILLIGRAM(S): 20 TABLET, DELAYED RELEASE ORAL at 18:44

## 2022-03-27 NOTE — PROGRESS NOTE ADULT - ATTENDING COMMENTS
72 year old woman with a pmhx of HTN, HLD, HARIS (on CPAP QHS), pericarditis (aged 26 ad 29), migraine depression, Fibromyalgia, GERD, COVID-19, and symptomatic persistent Afib (on Eliquis and metoprolol) s/p recent Afib ablation on 3/10/2022 and discharge on colchicine who was admitted with worsening CP that radiated down the left arm and back. Patient rated the pain as a 9/10 which lasted for 2-3 hours. It was associated with dizziness, SOB, nausea, and chills.  EGD: esophageal ulcer. Treat with Carafate, PPI, soft food, lidocaine. With Chadsvasc 3 (age, sex, HTN), continue Eliquis 5 mg po BID. Colchicine for pericarditis.

## 2022-03-27 NOTE — PROGRESS NOTE ADULT - PROVIDER SPECIALTY LIST ADULT
Electrophysiology
Heme/Onc
Electrophysiology
Gastroenterology
Cardiology
Electrophysiology
Electrophysiology
Hospitalist

## 2022-03-27 NOTE — PROGRESS NOTE ADULT - PROBLEM SELECTOR PLAN 10
- Currently on PPI pantoprazole 40mg IVP BID

## 2022-03-27 NOTE — PROGRESS NOTE ADULT - PROBLEM SELECTOR PLAN 4
- Low salt diet.  - Continue Cardizem, HCTZ, Losartan   - Monitor BP

## 2022-03-27 NOTE — DISCHARGE NOTE NURSING/CASE MANAGEMENT/SOCIAL WORK - NSDCVIVACCINE_GEN_ALL_CORE_FT
influenza, injectable, quadrivalent, preservative free; 28-Oct-2016 09:27; Allison Erwin (RN); [employer]; [id number]; IntraMuscular; Deltoid Right.; 0.5 milliLiter(s); VIS (VIS Published: 07-Aug-2015, VIS Presented: 28-Oct-2016);

## 2022-03-27 NOTE — PROGRESS NOTE ADULT - PROBLEM SELECTOR PLAN 8
- Not on any SSRI or antidepressant meds.  - F/U VitD25 level, TSH.
- Not on any SSRI or antidepressant meds.

## 2022-03-27 NOTE — DISCHARGE NOTE NURSING/CASE MANAGEMENT/SOCIAL WORK - NSDCPEFALRISK_GEN_ALL_CORE
For information on Fall & Injury Prevention, visit: [email or web address] OR  [email or web address] OR  https://www.cdc.gov/steadi/patient.html

## 2022-03-27 NOTE — PROGRESS NOTE ADULT - PROBLEM SELECTOR PLAN 11
- VTE covered with Eliquis
- VTE covered with Eliquis    DANIEL [place], d/c time 32 minutes
- VTE covered with Eliquis    DANIEL [place], d/c time 32 minutes
- VTE covered with Eliquis

## 2022-03-27 NOTE — PROGRESS NOTE ADULT - PROBLEM SELECTOR PLAN 6
- Continue CPAP setting 10 QHS.

## 2022-03-27 NOTE — PROGRESS NOTE ADULT - PROBLEM SELECTOR PLAN 2
- Patient able to tolerate liquids, states that she has pain with solid food intake  - EGD showed mid esophageal ulcer  - c/w Protonix 40 mg IV BID  - c/w sucralfate suspension 1g q6 hours x 2 weeks   - diet as tolerated
- Patient able to tolerate liquids, states that she has pain with solid food intake  - on CLD   - c/w Protonix 40 mg IV BID  - sucralfate suspension 1g q6 hours  - GI input noted, plan for EGD in AM
- Patient able to tolerate liquids, states that she has pain with solid food intake  - EGD showed mid esophageal ulcer  - c/w Protonix 40 mg IV BID  - c/w sucralfate suspension 1g q6 hours x 2 weeks   - diet as tolerated
- Patient able to tolerate liquids, states that she has pain with solid food intake  - EGD showed mid esophageal ulcer  - c/w Protonix 40 mg IV BID  - c/w sucralfate suspension 1g q6 hours x 2 weeks   - diet as tolerated

## 2022-03-27 NOTE — PROGRESS NOTE ADULT - SUBJECTIVE AND OBJECTIVE BOX
Interval History:  No acute events overnight    MEDICATIONS  (STANDING):  apixaban 5 milliGRAM(s) Oral every 12 hours  atorvastatin 20 milliGRAM(s) Oral at bedtime  colchicine 0.6 milliGRAM(s) Oral every 12 hours  diltiazem    milliGRAM(s) Oral daily  hydrochlorothiazide 12.5 milliGRAM(s) Oral daily  influenza  Vaccine (HIGH DOSE) 0.7 milliLiter(s) IntraMuscular once  losartan 100 milliGRAM(s) Oral daily  oxybutynin XL 10 milliGRAM(s) Oral daily  pantoprazole  Injectable 40 milliGRAM(s) IV Push every 12 hours  senna 2 Tablet(s) Oral at bedtime  sodium chloride 0.9% lock flush 3 milliLiter(s) IV Push every 8 hours  sucralfate suspension 1 Gram(s) Oral every 6 hours    MEDICATIONS  (PRN):  acetaminophen     Tablet .. 650 milliGRAM(s) Oral every 6 hours PRN Temp greater or equal to 38C (100.4F), Mild Pain (1 - 3), Moderate Pain (4 - 6)    Vital Signs Last 24 Hrs  T(C): 36.7 (03-25-22 @ 10:19), Max: 36.8 (03-24-22 @ 11:57)  T(F): 98 (03-25-22 @ 10:19), Max: 98.2 (03-24-22 @ 11:57)  HR: 52 (03-25-22 @ 10:19) (52 - 91)  BP: 128/54 (03-25-22 @ 10:19) (105/81 - 139/54)  BP(mean): --  RR: 14 (03-25-22 @ 10:19) (14 - 18)  SpO2: 98% (03-25-22 @ 10:19) (95% - 98%)    Appearance: Normal	  HEENT:   Normal oral mucosa, PERRL, EOMI	  Lymphatic: No lymphadenopathy  Cardiovascular: Normal S1 S2, No JVD, No murmurs, No edema  Respiratory: Lungs clear to auscultation	  Psychiatry: A & O x 3, Mood & affect appropriate  Gastrointestinal:  Soft, Non-tender, + BS	  Skin: No rashes, No ecchymoses, No cyanosis	  Neurologic: Non-focal  Extremities: Normal range of motion, No clubbing, cyanosis or edema  Vascular: Peripheral pulses palpable 2+ bilaterally    LABS:	 	  CBC Full  -  ( 25 Mar 2022 07:06 )  WBC Count : 6.53 K/uL  Hemoglobin : 13.1 g/dL  Hematocrit : 38.3 %  Platelet Count - Automated : 306 K/uL  Mean Cell Volume : 87.4 fL  Mean Cell Hemoglobin : 29.9 pg  Mean Cell Hemoglobin Concentration : 34.2 gm/dL  Auto Neutrophil # : x  Auto Lymphocyte # : x  Auto Monocyte # : x  Auto Eosinophil # : x  Auto Basophil # : x  Auto Neutrophil % : x  Auto Lymphocyte % : x  Auto Monocyte % : x  Auto Eosinophil % : x  Auto Basophil % : x    03-25    141  |  104  |  12  ----------------------------<  94  3.6   |  20<L>  |  0.73  03-23    141  |  104  |  17  ----------------------------<  99  4.0   |  24  |  0.66    Ca    9.6      25 Mar 2022 07:06  Ca    9.4      23 Mar 2022 15:43  Phos  3.9     03-25  Mg     2.00     03-25    TPro  7.6  /  Alb  4.0  /  TBili  0.4  /  DBili  x   /  AST  23  /  ALT  18  /  AlkPhos  95  03-23    LIVER FUNCTIONS - ( 23 Mar 2022 15:43 )  Alb: 4.0 g/dL / Pro: 7.6 g/dL / ALK PHOS: 95 U/L / ALT: 18 U/L / AST: 23 U/L / GGT: x                 
Patient is a 72y old  Female who presents with a chief complaint of Chest pain (24 Mar 2022 09:28)      SUBJECTIVE / OVERNIGHT EVENTS:  no acute events overnight.  patient now chest discomfort free yet reports ongoing odynophagia that started after ablation procedure.  denies palpitation, SOB.    Tele: NSR.     MEDICATIONS  (STANDING):  apixaban 5 milliGRAM(s) Oral every 12 hours  atorvastatin 20 milliGRAM(s) Oral at bedtime  colchicine 0.6 milliGRAM(s) Oral every 12 hours  diltiazem    milliGRAM(s) Oral daily  hydrochlorothiazide 12.5 milliGRAM(s) Oral daily  influenza  Vaccine (HIGH DOSE) 0.7 milliLiter(s) IntraMuscular once  losartan 100 milliGRAM(s) Oral daily  oxybutynin XL 10 milliGRAM(s) Oral daily  pantoprazole  Injectable 40 milliGRAM(s) IV Push every 12 hours  senna 2 Tablet(s) Oral at bedtime  sodium chloride 0.9% lock flush 3 milliLiter(s) IV Push every 8 hours    MEDICATIONS  (PRN):  acetaminophen     Tablet .. 650 milliGRAM(s) Oral every 6 hours PRN Temp greater or equal to 38C (100.4F), Mild Pain (1 - 3), Moderate Pain (4 - 6)      T(C): 36.8 (03-24-22 @ 11:57), Max: 36.8 (03-24-22 @ 11:57)  HR: 67 (03-24-22 @ 11:57) (57 - 85)  BP: 122/63 (03-24-22 @ 11:57) (122/63 - 165/69)  RR: 18 (03-24-22 @ 11:57) (14 - 18)  SpO2: 98% (03-24-22 @ 11:57) (98% - 100%)  CAPILLARY BLOOD GLUCOSE        I&O's Summary      PHYSICAL EXAM:  Constitutional: NAD.   HEENT: AT/NC, EOMI, Supple neck;  Respiratory: no wheezing or crackles. no increase in WOB  Cardiovascular: RRR, S1, S2, systolic murmur. 2+ distal pulses. no JVD, 2+ b/l LE edema.  Gastrointestinal: soft; NT/ND, +BS  Extremities: no cyanosis; non-tender to palpation, DP and Radial pulses intact.  Neurological: A&Ox 3;  Psychiatric: normal mood/affect.    LABS:                        13.7   9.01  )-----------( 291      ( 23 Mar 2022 15:43 )             40.8     03-23    141  |  104  |  17  ----------------------------<  99  4.0   |  24  |  0.66    Ca    9.4      23 Mar 2022 15:43    TPro  7.6  /  Alb  4.0  /  TBili  0.4  /  DBili  x   /  AST  23  /  ALT  18  /  AlkPhos  95  03-23              RADIOLOGY & ADDITIONAL TESTS:    Imaging Personally Reviewed: DARWIN    Consultant(s) Notes Reviewed:  DARWIN    Care Discussed with Consultants/Other Providers: DARWIN  
Interval History:  Patient is seen and examined. Denies CP, Palpitation, SOB, dizziness. Lying in bed with no complaints,   Tele revealed SR.       PAST MEDICAL & SURGICAL HISTORY:  HTN (Hypertension)    Pericarditis  around age 29&#x27;s    Muscle Cramps at Night    HARIS (obstructive sleep apnea)  noncompliant with CPAP    Patient is Jose Guadalupe&#x27;s Witness  refuse blood products    Primary osteoarthritis of right knee    Morbid obesity due to excess calories    No blood products  Pt is Jehovah Witness    HARIS on CPAP    Overactive bladder    H/O constipation    GERD (gastroesophageal reflux disease)    Atrial fibrillation  persistent in 2022    H/O fibromyalgia    Anxiety and depression    Latex allergy    Migraines    Status Post Breast Lumpectomy  right benign    Bladder Prolapse, Congenital    Bladder Prolapse, Acquired  s/p repair    Tubal Ligation    Status post total right knee replacement  10/25/17    S/P AV melchor ablation  3/10/22        MEDICATIONS  (STANDING):  apixaban 5 milliGRAM(s) Oral every 12 hours  atorvastatin 20 milliGRAM(s) Oral at bedtime  colchicine 0.6 milliGRAM(s) Oral every 12 hours  diltiazem    milliGRAM(s) Oral daily  hydrochlorothiazide 12.5 milliGRAM(s) Oral daily  influenza  Vaccine (HIGH DOSE) 0.7 milliLiter(s) IntraMuscular once  losartan 100 milliGRAM(s) Oral daily  oxybutynin XL 10 milliGRAM(s) Oral daily  pantoprazole  Injectable 40 milliGRAM(s) IV Push every 12 hours  senna 2 Tablet(s) Oral at bedtime  sodium chloride 0.9% lock flush 3 milliLiter(s) IV Push every 8 hours    MEDICATIONS  (PRN):  acetaminophen     Tablet .. 650 milliGRAM(s) Oral every 6 hours PRN Temp greater or equal to 38C (100.4F), Mild Pain (1 - 3), Moderate Pain (4 - 6)            Vital Signs Last 24 Hrs  T(C): 36.6 (24 Mar 2022 05:42), Max: 36.7 (23 Mar 2022 13:14)  T(F): 97.8 (24 Mar 2022 05:42), Max: 98.1 (24 Mar 2022 03:15)  HR: 64 (24 Mar 2022 06:43) (57 - 85)  BP: 155/74 (24 Mar 2022 05:42) (147/75 - 165/69)  BP(mean): --  RR: 18 (24 Mar 2022 05:42) (14 - 18)  SpO2: 98% (24 Mar 2022 06:43) (98% - 100%)            INTERPRETATION OF TELEMETRY: SR at 70s with occasional PVCs          LABS:                        13.7   9.01  )-----------( 291      ( 23 Mar 2022 15:43 )             40.8     03-23    141  |  104  |  17  ----------------------------<  99  4.0   |  24  |  0.66    Ca    9.4      23 Mar 2022 15:43    TPro  7.6  /  Alb  4.0  /  TBili  0.4  /  DBili  x   /  AST  23  /  ALT  18  /  AlkPhos  95  03-23            I&O's Summary    BNPSerum Pro-Brain Natriuretic Peptide: 276 pg/mL (03-23 @ 17:49)    RADIOLOGY & ADDITIONAL STUDIES:      PHYSICAL EXAM:    GENERAL: In no apparent distress, well nourished, and hydrated.  HEART: Regular rate and rhythm; No murmurs, rubs, or gallops.  PULMONARY: Clear to auscultation and percussion.  No rales, wheezing, or rhonchi bilaterally.  ABDOMEN: Soft, Nontender, Nondistended; Bowel sounds present  EXTREMITIES:  2+ Peripheral Pulses, No clubbing, cyanosis, or edema  NEUROLOGICAL: Grossly nonfocal        
Patient is a 72y old  Female who presents with a chief complaint of Chest pain (24 Mar 2022 12:44)      MEDICATIONS  (STANDING):  apixaban 5 milliGRAM(s) Oral every 12 hours  atorvastatin 20 milliGRAM(s) Oral at bedtime  colchicine 0.6 milliGRAM(s) Oral every 12 hours  diltiazem    milliGRAM(s) Oral daily  hydrochlorothiazide 12.5 milliGRAM(s) Oral daily  influenza  Vaccine (HIGH DOSE) 0.7 milliLiter(s) IntraMuscular once  losartan 100 milliGRAM(s) Oral daily  oxybutynin XL 10 milliGRAM(s) Oral daily  pantoprazole  Injectable 40 milliGRAM(s) IV Push every 12 hours  senna 2 Tablet(s) Oral at bedtime  sodium chloride 0.9% lock flush 3 milliLiter(s) IV Push every 8 hours  sucralfate suspension 1 Gram(s) Oral every 6 hours    MEDICATIONS  (PRN):  acetaminophen     Tablet .. 650 milliGRAM(s) Oral every 6 hours PRN Temp greater or equal to 38C (100.4F), Mild Pain (1 - 3), Moderate Pain (4 - 6)      ROS  No fever, sweats, chills  No epistaxis, HA, sore throat  No CP, SOB, cough, sputum  No n/v/d, abd pain, melena, hematochezia  No edema  No rash  No anxiety  No back pain, joint pain  No bleeding, bruising  No dysuria, hematuria    Vital Signs Last 24 Hrs  T(C): 36.6 (25 Mar 2022 05:00), Max: 36.8 (24 Mar 2022 11:57)  T(F): 97.9 (25 Mar 2022 05:00), Max: 98.2 (24 Mar 2022 11:57)  HR: 91 (25 Mar 2022 06:35) (54 - 91)  BP: 122/69 (25 Mar 2022 05:00) (122/63 - 139/54)  BP(mean): --  RR: 18 (25 Mar 2022 05:00) (18 - 18)  SpO2: 96% (25 Mar 2022 06:35) (95% - 98%)    PE  NAD  Awake, alert  Anicteric, MMM  RRR  CTAB  Abd soft, NT, ND  No c/c/e  No rash grossly  FROM                          13.1   6.53  )-----------( 306      ( 25 Mar 2022 07:06 )             38.3       03-23    141  |  104  |  17  ----------------------------<  99  4.0   |  24  |  0.66    Ca    9.4      23 Mar 2022 15:43    TPro  7.6  /  Alb  4.0  /  TBili  0.4  /  DBili  x   /  AST  23  /  ALT  18  /  AlkPhos  95  03-23      Patient name: EMRE KIM  YOB: 1950   Age: 72 (F)   MR#: [id number]  Study Date: 3/23/2022  Location: O/PSonographer: Gian Gonzales M.D.  Study quality: Technically Difficult/Limited  Referring Physician: Not Available Doctor, MD  Blood Pressure: 172/75 mmHg  Height: 160 cm  Weight: 92 kg  BSA: 1.9 m2  ------------------------------------------------------------------------  PROCEDURE: Transthoracic echocardiogram with 2-D, M-Mode  and complete spectral and color flow Doppler.  INDICATION: Cardiac tamponade (I31.4)  ------------------------------------------------------------------------  OBSERVATIONS:  Mitral Valve: Mitral annular calcification, otherwise  normal mitral valve.  Aortic Root: Normal aortic root.  Left Atrium: Normal left atrium.  Left Ventricle: Endocardium not well visualized; grossly  normal left ventricular systolic function.  Right Heart: Normal right atrium. The right ventricle is  not well visualized; grossly normal right ventricular  systolic function. Normal tricuspid valve.  Pericardium/PleuraNormal pericardium with no pericardial  effusion.  ------------------------------------------------------------------------  CONCLUSIONS:  Limited TTE  1. Mitral annular calcification, otherwise normal mitral  valve.  2. Endocardium not well visualized; grossly normal left  ventricular systolic function.  3. The right ventricle is not well visualized; grossly  normal right ventricular systolic function.  4. Normal pericardium with no pericardial effusion.  ------------------------------------------------------------------------  Confirmed on  3/24/2022 - 17:22:21 by ZAID Cowan  -------------------------    
    Interval Events:   No acute events    Hospital Medications:  acetaminophen     Tablet .. 650 milliGRAM(s) Oral every 6 hours PRN  apixaban 5 milliGRAM(s) Oral every 12 hours  atorvastatin 20 milliGRAM(s) Oral at bedtime  colchicine 0.6 milliGRAM(s) Oral every 12 hours  diltiazem    milliGRAM(s) Oral daily  hydrochlorothiazide 12.5 milliGRAM(s) Oral daily  influenza  Vaccine (HIGH DOSE) 0.7 milliLiter(s) IntraMuscular once  losartan 100 milliGRAM(s) Oral daily  oxybutynin XL 10 milliGRAM(s) Oral daily  pantoprazole  Injectable 40 milliGRAM(s) IV Push every 12 hours  senna 2 Tablet(s) Oral at bedtime  sodium chloride 0.9% lock flush 3 milliLiter(s) IV Push every 8 hours  sucralfate suspension 1 Gram(s) Oral every 6 hours      ROS: All system reviewed and negative except as mentioned above.    PHYSICAL EXAM:   Vital Signs:  Vital Signs Last 24 Hrs  T(C): 36.3 (25 Mar 2022 21:40), Max: 36.7 (25 Mar 2022 10:19)  T(F): 97.4 (25 Mar 2022 21:40), Max: 98 (25 Mar 2022 10:19)  HR: 54 (26 Mar 2022 03:45) (45 - 91)  BP: 143/60 (25 Mar 2022 21:40) (105/70 - 143/60)  BP(mean): --  RR: 16 (25 Mar 2022 21:40) (14 - 19)  SpO2: 98% (25 Mar 2022 23:30) (96% - 98%)  Daily Height in cm: 160 (25 Mar 2022 10:19)    Daily     GENERAL:  NAD, Appears stated age  HEENT:  NC/AT,  conjunctivae clear and pink, sclera -anicteric  CHEST:  Normal Effort, Breath sounds clear  HEART:  RRR, S1 + S2, no murmurs  ABDOMEN:  Soft, non-tender, non-distended, normoactive bowel sounds,  no masses  EXTREMITIES:  no cyanosis or edema  SKIN:  Warm & Dry. No rash or erythema  NEURO:  Alert, oriented, no focal deficit    LABS:                        13.1   6.53  )-----------( 306      ( 25 Mar 2022 07:06 )             38.3     Mean Cell Volume: 87.4 fL (03-25-22 @ 07:06)    03-25    141  |  104  |  12  ----------------------------<  94  3.6   |  20<L>  |  0.73    Ca    9.6      25 Mar 2022 07:06  Phos  3.9     03-25  Mg     2.00     03-25                                    13.1   6.53  )-----------( 306      ( 25 Mar 2022 07:06 )             38.3                         13.7   9.01  )-----------( 291      ( 23 Mar 2022 15:43 )             40.8       Imaging: Images reviewed.    
Overnight Events:   No overnight events    Current Meds:  acetaminophen     Tablet .. 650 milliGRAM(s) Oral every 6 hours PRN  apixaban 5 milliGRAM(s) Oral every 12 hours  atorvastatin 20 milliGRAM(s) Oral at bedtime  colchicine 0.6 milliGRAM(s) Oral every 12 hours  diltiazem    milliGRAM(s) Oral daily  hydrochlorothiazide 12.5 milliGRAM(s) Oral daily  influenza  Vaccine (HIGH DOSE) 0.7 milliLiter(s) IntraMuscular once  lidocaine 2% Viscous 15 milliLiter(s) Swish and Spit every 6 hours PRN  losartan 100 milliGRAM(s) Oral daily  oxybutynin XL 10 milliGRAM(s) Oral daily  pantoprazole  Injectable 40 milliGRAM(s) IV Push every 12 hours  senna 2 Tablet(s) Oral at bedtime  sodium chloride 0.9% lock flush 3 milliLiter(s) IV Push every 8 hours  sucralfate suspension 1 Gram(s) Oral every 6 hours        Vitals:  ICU Vital Signs Last 24 Hrs  T(C): 36.6 (27 Mar 2022 05:23), Max: 36.7 (26 Mar 2022 11:54)  T(F): 97.9 (27 Mar 2022 05:23), Max: 98.1 (26 Mar 2022 21:00)  HR: 60 (27 Mar 2022 05:23) (60 - 69)  BP: 150/73 (27 Mar 2022 05:23) (142/61 - 150/73)  BP(mean): --  ABP: --  ABP(mean): --  RR: 18 (27 Mar 2022 05:23) (17 - 18)  SpO2: 98% (27 Mar 2022 05:23) (97% - 100%)      I&O's Summary          Physical Exam:  Appearance: No acute distress; well appearing  Eyes: PERRL, EOMI, pink conjunctiva  HENT: Normal oral mucosa  Cardiovascular: RRR, S1, S2, no murmurs, rubs, or gallops; no edema; no JVD  Respiratory: Clear to auscultation bilaterally  Gastrointestinal: soft, non-tender, non-distended with normal bowel sounds  Musculoskeletal: No clubbing; no joint deformity   Neurologic: Non-focal  Lymphatic: No lymphadenopathy  Psychiatry: AAOx3, mood & affect appropriate  Skin: No rashes, ecchymoses, or cyanosis                          13.1   5.29  )-----------( 254      ( 27 Mar 2022 08:03 )             40.1     03-27    138  |  102  |  14  ----------------------------<  113<H>  3.4<L>   |  21<L>  |  0.78    Ca    9.3      27 Mar 2022 08:03  Phos  3.5     03-27  Mg     2.10     03-27            Serum Pro-Brain Natriuretic Peptide: 276 pg/mL (03-23 @ 17:49)          New ECG(s): Personally reviewed    Echo:    Stress Testing:     Cath:    Imaging:    Interpretation of Telemetry:  
Subjective  When entered room, GI service was conversing with patient.  Patient still has residual odynophagia.     Past Medical History    PAST MEDICAL & SURGICAL HISTORY:  HTN (Hypertension)    Pericarditis  around age 29&#x27;s    Muscle Cramps at Night    HARIS (obstructive sleep apnea)  noncompliant with CPAP    Patient is [employer] Guadalupe&#x27;s Witness  refuse blood products    Primary osteoarthritis of right knee    Morbid obesity due to excess calories    Overactive bladder    H/O constipation    GERD (gastroesophageal reflux disease)    Atrial fibrillation  persistent in 2022    H/O fibromyalgia    Anxiety and depression    Latex allergy    Migraines    Status Post Breast Lumpectomy  right benign    Bladder Prolapse, Acquired  s/p repair    Tubal Ligation    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 650 milliGRAM(s) Oral every 6 hours PRN    pantoprazole  Injectable 40 milliGRAM(s) IV Push every 12 hours  senna 2 Tablet(s) Oral at bedtime  sucralfate suspension 1 Gram(s) Oral every 6 hours    atorvastatin 20 milliGRAM(s) Oral at bedtime  colchicine 0.6 milliGRAM(s) Oral every 12 hours    influenza  Vaccine (HIGH DOSE) 0.7 milliLiter(s) IntraMuscular once  lidocaine 2% Viscous 15 milliLiter(s) Swish and Spit every 6 hours PRN  oxybutynin XL 10 milliGRAM(s) Oral daily  sodium chloride 0.9% lock flush 3 milliLiter(s) IV Push every 8 hours        Allergies    adhesives (Rash)  latex (Rash)  metoprolol (Short breath)  nickel allergy- rash (Rash)    Intolerances        FAMILY HISTORY:  Family history of congenital heart disease (Sibling)    Family hx of lung cancer (Father)    FH: breast cancer (Sibling)        SOCIAL HISTORY    Marital Status:   Occupation:   Lives with:     SUBSTANCE USE  Tobacco Usage:  ( ) None ( ) never smoked   ( ) former smoker  ( ) current smoker; Packs per day:   Alcohol Usage: ( ) none  ( ) occasional ( ) 2-3 times a week ( ) daily; Last drink:   Recreational drugs ( ) None    CONSTITUTIONAL: No fevers, No chills, No fatigue, No weight gain  EYES: No vision changes   ENT: No congestion, No ear pain, No sore throat.  NECK: No pain, No stiffness  RESPIRATORY: No shortness of breath, No cough, No wheezing, No hemoptysis  CARDIOVASCULAR: No chest pain. No palpitations, No FUENTES, No orthopnea, No paroxysmal nocturnal dyspnea, No pleuritic pain  GASTROINTESTINAL:  Odynophagia. No nausea, No vomiting, No hematemesis, No diarrhea No constipation. No melena  GENITOURINARY: No dysuria, No frequency, No incontinence, No hematuria  NEUROLOGICAL: No dizziness, No lightheadedness, No syncope, No LOC, No headache, No numbness or weakness  MUSCULOSKELETAL: No Edema, No joint pain, No joint swelling.  PSYCHIATRIC: No anxiety, No depression  DERMATOLOGY: No diaphoresis. No itching, No rashes, No pressure ulcers  HEME/LYMPH: No easy bruising, or bleeding gums    All other review of systems is negative unless indicated above.    VITAL SIGNS  T(C): 36.7 (03-26-22 @ 11:54), Max: 36.8 (03-26-22 @ 06:00)  HR: 65 (03-26-22 @ 19:32) (54 - 67)  BP: 142/61 (03-26-22 @ 11:54) (142/61 - 153/61)  RR: 18 (03-26-22 @ 19:32) (17 - 18)  SpO2: 97% (03-26-22 @ 19:32) (97% - 98%)  Wt(kg): --    Appearance: NAD, no distress  HEENT: Moist Mucous Membranes, Anicteric, PERRL, EOMI  Cardiovascular: Regular rate and rhythm, Normal S1 S2, No JVD, No murmurs  Respiratory: Lungs clear to auscultation. No rales, No rhonchi, No wheezing. No tenderness to palpation  Gastrointestinal:  Soft, Non-tender, + BS  Neurologic: Non-focal, A&Ox3  Skin: Warm and dry, No rashes, No ecchymosis, No cyanosis  Musculoskeletal: No clubbing, No cyanosis, No edema  Vascular: Peripheral pulses palpable 2+ bilaterally  Psychiatry: Mood & affect appropriate      	    		        I&O's Summary      LABORATORY VALUES	 	                          13.8   6.43  )-----------( 308      ( 26 Mar 2022 08:03 )             40.6       03-26    137  |  103  |  13  ----------------------------<  122<H>  3.7   |  21<L>  |  0.79  03-25    141  |  104  |  12  ----------------------------<  94  3.6   |  20<L>  |  0.73    Ca    9.4      26 Mar 2022 08:03  Ca    9.6      25 Mar 2022 07:06  Phos  3.4     03-26  Phos  3.9     03-25  Mg     1.90     03-26  Mg     2.00     03-25            CARDIAC MARKERS:          Serum Pro-Brain Natriuretic Peptide: 276 pg/mL (03-23 @ 17:49)                CAPILLARY BLOOD GLUCOSE              TELEMETRY: 	Sinus rhythm    ECG:  	  RADIOLOGY:  OTHER: 	    PREVIOUS DIAGNOSTIC TESTING:    [ ] Echocardiogram: < from: Transthoracic Echocardiogram (03.23.22 @ 16:34) >  Patient name: EMRE KIM  YOB: 1950   Age: 72 (F)   MR#: [id number]  Study Date: 3/23/2022  Location: O/PSonographer: Gian Gonzales M.D.  Study quality: Technically Difficult/Limited  Referring Physician: Not Available Doctor, MD  Blood Pressure: 172/75 mmHg  Height: 160 cm  Weight: 92 kg  BSA: 1.9 m2  ------------------------------------------------------------------------  PROCEDURE: Transthoracic echocardiogram with 2-D, M-Mode  and complete spectral and color flow Doppler.  INDICATION: Cardiac tamponade (I31.4)  ------------------------------------------------------------------------  OBSERVATIONS:  Mitral Valve: Mitral annular calcification, otherwise  normal mitral valve.  Aortic Root: Normal aortic root.  Left Atrium: Normal left atrium.  Left Ventricle: Endocardium not well visualized; grossly  normal left ventricular systolic function.  Right Heart: Normal right atrium. The right ventricle is  not well visualized; grossly normal right ventricular  systolic function. Normal tricuspid valve.  Pericardium/PleuraNormal pericardium with no pericardial  effusion.  ------------------------------------------------------------------------  CONCLUSIONS:  Limited TTE  1. Mitral annular calcification, otherwise normal mitral  valve.  2. Endocardium not well visualized; grossly normal left  ventricular systolic function.  3. The right ventricle is not well visualized; grossly  normal right ventricular systolic function.  4. Normal pericardium with no pericardial effusion.  ------------------------------------------------------------------------  Confirmed on  3/24/2022 - 17:22:21 by ZAID Cowan  ---------------------------------------------    < end of copied text >    [ ] Catheterization:  [ ] Stress Test:  	  	  ACC: [id number] EXAM:  CT ANGIO CHEST PULM ART [facility]                        ACC: [id number] EXAM:  CT ANGIO ABD PELV (W)AW IC                          PROCEDURE DATE:  03/23/2022          INTERPRETATION:  CLINICAL INFORMATION: Chest pain, evaluate for aortic   dissection.    COMPARISON: Chest radiograph from the same date. Chest CTA dated   6/14/2011.    CONTRAST/COMPLICATIONS:  IV Contrast: Omnipaque 350  90 cc administered   10 cc discarded  Oral Contrast: NONE  Complications: None reported at time of study completion    PROCEDURE:  CT Angiography of the Chest, Abdomen and Pelvis.  Precontrast imaging was performed through the chest followed by arterial   phase imaging of the chest, abdomen and pelvis.  Sagittal and coronal reformats were performed as well as 3D (MIP)   reconstructions.    FINDINGS:  CHEST:  LUNGS AND LARGE AIRWAYS: Patent central airways. The lungs are clear.  PLEURA: No pleural effusion or pneumothorax.  VESSELS: No aortic dissection, intramural hematoma or aneurysm.  HEART: Qualitative enlargement of the left atrium. No pericardial   effusion. Coronary arterial calcification is present  MEDIASTINUM AND GABRIELA: No lymphadenopathy.  CHEST WALL AND LOWER NECK: Within normal limits.    ABDOMEN AND PELVIS:  LIVER: Within normal limits. Punctate calcification in the left hepatic   lobe.  BILE DUCTS: Normal caliber.  GALLBLADDER: Within normal limits.  SPLEEN: Within normal limits.  PANCREAS: Within normal limits.  ADRENALS: Within normal limits.  KIDNEYS/URETERS: No hydronephrosis or nephrolithiasis.    BLADDER: Within normal limits.  REPRODUCTIVE ORGANS: Uterus and adnexa within normal limits.    BOWEL: No bowel obstruction. Appendix is normal.  PERITONEUM: No ascites.  VESSELS: The abdominal aorta is normal in caliber without aneurysm or   dissection. There is at least mild stenosis of the origins of the celiac   axis, superior mesenteric artery, inferior mesenteric artery secondary to   atherosclerotic plaque. The bilateral renal arteries are patent.   Atherosclerotic changes.  RETROPERITONEUM/LYMPH NODES: No lymphadenopathy.  ABDOMINAL WALL: Small fat-containing umbilical hernia.  BONES: Degenerative changes.    IMPRESSION:  No aortic dissection.        --- End of Report ---          TJ ROWAN MD; Resident Radiologist  This document has been electronically signed.  DIEGO PHILLIPS M.D., Attending Radiologist  This document has been electronically signed. Mar 23 2022  8:29PM      [facility]  _______________________________________________________________________________  Patient Name: Ada Leuthner            Procedure Date: 3/25/2022 10:08 AM  MRN: [id number]                     Account Number: [id number]  YOB: 1950               Admit Type: Inpatient  Room: [place] [address]                         Gender: Female  Attending MD: EMILIANA FITCH MD   _______________________________________________________________________________     Procedure:           Upper GI endoscopy  Indications:         Odynophagia  Providers:           EMILIANA FITCH MD, KATH CORTÉS (Fellow)  Medicines:           See the Anesthesia note for documentation of the                        administered medications  Complications:       No immediate complications.  Procedure:           Pre-Anesthesia Assessment:                       - Prior to the procedure, a History and Physical was                        performed, and patient medications and allergies were                        reviewed. The patient is competent. The risks and                        benefits of the procedure and the sedation options and                        risks were discussed with the patient. All questions              were answered and informed consent was obtained. Patient                        identification and proposed procedure were verified by                        the physician, the nurse, the anesthesiologist and the                        anesthetist in the pre-procedure area. Mental Status                        Examination: alert and oriented. Airway Examination:                        normal oropharyngeal airway and neck mobility.                        Respiratory Examination: clear to auscultation. CV                        Examination: normal. Prophylactic Antibiotics: The                        patient does not require prophylactic antibiotics. Prior                        Anticoagulants: The patient has taken Eliquis                (apixaban), last dose was day of procedure. ASA Grade                        Assessment: III - A patient with severe systemic                        disease. After reviewing the risks and benefits, the                        patient was deemed in satisfactory condition to undergo                        the procedure. The anesthesia plan was to use monitored                        anesthesia care (MAC). Immediately prior to                        administration of medications, the patient was                        re-assessed for adequacy to receive sedatives. The heart                        rate, respiratory rate, oxygen saturations, blood                        pressure, adequacy of pulmonary ventilation, and        response to care were monitored throughout the                        procedure. The physical status of the patient was                        re-assessed after the procedure.                       After obtaining informed consent, the endoscopewas                        passed under direct vision. Throughout the procedure,                        the patient's blood pressure, pulse, and oxygen                        saturations were monitored continuously. The Endoscope     was introduced through the mouth, and advanced to the                        second part of duodenum. The upper GI endoscopy was                        accomplished without difficulty. The patient tolerated                        the procedure well.                                                                                   Findings:       One cratered esophageal ulcer with no bleeding and no stigmata of recent        bleeding was found 27 cm from the incisors. The lesion was 8 mm in     largest dimension.       The Z-line was regular and was found 37 cm from the incisors. The GEJ, Z        line and hiatus were located at 37cm.       The exam of the esophagus was otherwise normal.       Multiple small sessile polyps with no bleeding and no stigmata of recent        bleeding were found in the gastric fundus and antrum. Examination of the        stomach was otherwise normal.       The first portion of the duodenum was normal.       One small angioectasia without bleeding was found in the second portion        of the duodenum.                                                                                   Impression:          - Non-bleeding mid esophageal ulcer, c/w source of                        odynophagia.          - Z-line regular, 37 cm from the incisors.                       - Multiple gastric fundus and gastric body polyps,                        concerning for fundic gland polyp.                       - Normal first portion of the duodenum.                 - One non-bleeding angioectasia in the duodenum.                        Incidental finding- no treatment.                       - No specimens collected.  Recommendation:      - Return patient to hospital walker for ongoing care.                - Advance diet as tolerated today.                       - Sucralfate (slurry) 1 g every 6 hrs for 2 weeks.                                                                                   Attending Participation:       I was present and participated during the entire procedure from        insertion to removal of the endoscope.                                                                                     ______________________  EMILIANA FITCH MD  3/25/2022 11:04:08 AM  This report has been signed electronically.  Number of Addenda: 0    Note Initiated On: 3/25/2022 10:08 AM    
PROGRESS NOTE:     Patient is a 72y old  Female who presents with a chief complaint of Chest pain (26 Mar 2022 08:26)      SUBJECTIVE / OVERNIGHT EVENTS: No acute events. Tolerating liquids and soft food.     ADDITIONAL REVIEW OF SYSTEMS:    MEDICATIONS  (STANDING):  apixaban 5 milliGRAM(s) Oral every 12 hours  atorvastatin 20 milliGRAM(s) Oral at bedtime  colchicine 0.6 milliGRAM(s) Oral every 12 hours  diltiazem    milliGRAM(s) Oral daily  hydrochlorothiazide 12.5 milliGRAM(s) Oral daily  influenza  Vaccine (HIGH DOSE) 0.7 milliLiter(s) IntraMuscular once  losartan 100 milliGRAM(s) Oral daily  oxybutynin XL 10 milliGRAM(s) Oral daily  pantoprazole  Injectable 40 milliGRAM(s) IV Push every 12 hours  senna 2 Tablet(s) Oral at bedtime  sodium chloride 0.9% lock flush 3 milliLiter(s) IV Push every 8 hours  sucralfate suspension 1 Gram(s) Oral every 6 hours    MEDICATIONS  (PRN):  acetaminophen     Tablet .. 650 milliGRAM(s) Oral every 6 hours PRN Temp greater or equal to 38C (100.4F), Mild Pain (1 - 3), Moderate Pain (4 - 6)      CAPILLARY BLOOD GLUCOSE        I&O's Summary      PHYSICAL EXAM:  Vital Signs Last 24 Hrs  T(C): 36.8 (26 Mar 2022 06:00), Max: 36.8 (26 Mar 2022 06:00)  T(F): 98.2 (26 Mar 2022 06:00), Max: 98.2 (26 Mar 2022 06:00)  HR: 55 (26 Mar 2022 06:00) (49 - 68)  BP: 153/61 (26 Mar 2022 06:00) (109/57 - 153/61)  BP(mean): --  RR: 17 (26 Mar 2022 06:00) (16 - 17)  SpO2: 98% (26 Mar 2022 06:00) (96% - 98%)    CONSTITUTIONAL: NAD, well-developed  RESPIRATORY: Normal respiratory effort; lungs are clear to auscultation bilaterally  CARDIOVASCULAR: Regular rate and rhythm, normal S1 and S2, no murmur/rub/gallop; mild lower extremity edema; Peripheral pulses are 2+ bilaterally  ABDOMEN: Nontender to palpation, normoactive bowel sounds, no rebound/guarding; No hepatosplenomegaly  MUSCLOSKELETAL: no clubbing or cyanosis of digits; no joint swelling or tenderness to palpation  PSYCH: A+O to person, place, and time; affect appropriate    LABS:                        13.8   6.43  )-----------( 308      ( 26 Mar 2022 08:03 )             40.6     03-26    137  |  103  |  13  ----------------------------<  122<H>  3.7   |  21<L>  |  0.79    Ca    9.4      26 Mar 2022 08:03  Phos  3.4     03-26  Mg     1.90     03-26                  RADIOLOGY & ADDITIONAL TESTS:  Results Reviewed:   Imaging Personally Reviewed:  Electrocardiogram Personally Reviewed:    COORDINATION OF CARE:  Care Discussed with Consultants/Other Providers [Y/N]:  Prior or Outpatient Records Reviewed [Y/N]:  
PROGRESS NOTE:     Patient is a 72y old  Female who presents with a chief complaint of Chest pain (26 Mar 2022 22:12)      SUBJECTIVE / OVERNIGHT EVENTS: No acute events.     ADDITIONAL REVIEW OF SYSTEMS:    MEDICATIONS  (STANDING):  apixaban 5 milliGRAM(s) Oral every 12 hours  atorvastatin 20 milliGRAM(s) Oral at bedtime  colchicine 0.6 milliGRAM(s) Oral every 12 hours  diltiazem    milliGRAM(s) Oral daily  hydrochlorothiazide 12.5 milliGRAM(s) Oral daily  influenza  Vaccine (HIGH DOSE) 0.7 milliLiter(s) IntraMuscular once  losartan 100 milliGRAM(s) Oral daily  oxybutynin XL 10 milliGRAM(s) Oral daily  pantoprazole  Injectable 40 milliGRAM(s) IV Push every 12 hours  senna 2 Tablet(s) Oral at bedtime  sodium chloride 0.9% lock flush 3 milliLiter(s) IV Push every 8 hours  sucralfate suspension 1 Gram(s) Oral every 6 hours    MEDICATIONS  (PRN):  acetaminophen     Tablet .. 650 milliGRAM(s) Oral every 6 hours PRN Temp greater or equal to 38C (100.4F), Mild Pain (1 - 3), Moderate Pain (4 - 6)  lidocaine 2% Viscous 15 milliLiter(s) Swish and Spit every 6 hours PRN ulcer symptoms      CAPILLARY BLOOD GLUCOSE        I&O's Summary      PHYSICAL EXAM:  Vital Signs Last 24 Hrs  T(C): 36.6 (27 Mar 2022 05:23), Max: 36.7 (26 Mar 2022 11:54)  T(F): 97.9 (27 Mar 2022 05:23), Max: 98.1 (26 Mar 2022 21:00)  HR: 60 (27 Mar 2022 05:23) (60 - 69)  BP: 150/73 (27 Mar 2022 05:23) (142/61 - 150/73)  BP(mean): --  RR: 18 (27 Mar 2022 05:23) (17 - 18)  SpO2: 98% (27 Mar 2022 05:23) (97% - 100%)    CONSTITUTIONAL: NAD, well-developed  RESPIRATORY: Normal respiratory effort; lungs are clear to auscultation bilaterally  CARDIOVASCULAR: Regular rate and rhythm, normal S1 and S2, no murmur/rub/gallop; mild lower extremity edema; Peripheral pulses are 2+ bilaterally  ABDOMEN: Nontender to palpation, normoactive bowel sounds, no rebound/guarding; No hepatosplenomegaly  MUSCLOSKELETAL: no clubbing or cyanosis of digits; no joint swelling or tenderness to palpation  PSYCH: A+O to person, place, and time; affect appropriate    LABS:                        13.1   5.29  )-----------( 254      ( 27 Mar 2022 08:03 )             40.1     03-27    138  |  102  |  14  ----------------------------<  113<H>  3.4<L>   |  21<L>  |  0.78    Ca    9.3      27 Mar 2022 08:03  Phos  3.5     03-27  Mg     2.10     03-27                  RADIOLOGY & ADDITIONAL TESTS:  Results Reviewed:   Imaging Personally Reviewed:  Electrocardiogram Personally Reviewed:    COORDINATION OF CARE:  Care Discussed with Consultants/Other Providers [Y/N]:  Prior or Outpatient Records Reviewed [Y/N]:  
PROGRESS NOTE:     Patient is a 72y old  Female who presents with a chief complaint of Chest pain (25 Mar 2022 10:20)      SUBJECTIVE / OVERNIGHT EVENTS: No acute events.     ADDITIONAL REVIEW OF SYSTEMS:    MEDICATIONS  (STANDING):  apixaban 5 milliGRAM(s) Oral every 12 hours  atorvastatin 20 milliGRAM(s) Oral at bedtime  colchicine 0.6 milliGRAM(s) Oral every 12 hours  diltiazem    milliGRAM(s) Oral daily  hydrochlorothiazide 12.5 milliGRAM(s) Oral daily  influenza  Vaccine (HIGH DOSE) 0.7 milliLiter(s) IntraMuscular once  losartan 100 milliGRAM(s) Oral daily  oxybutynin XL 10 milliGRAM(s) Oral daily  pantoprazole  Injectable 40 milliGRAM(s) IV Push every 12 hours  potassium chloride  10 mEq/100 mL IVPB 10 milliEquivalent(s) IV Intermittent every 1 hour  senna 2 Tablet(s) Oral at bedtime  sodium chloride 0.9% lock flush 3 milliLiter(s) IV Push every 8 hours  sucralfate suspension 1 Gram(s) Oral every 6 hours    MEDICATIONS  (PRN):  acetaminophen     Tablet .. 650 milliGRAM(s) Oral every 6 hours PRN Temp greater or equal to 38C (100.4F), Mild Pain (1 - 3), Moderate Pain (4 - 6)      CAPILLARY BLOOD GLUCOSE        I&O's Summary      PHYSICAL EXAM:  Vital Signs Last 24 Hrs  T(C): 36.1 (25 Mar 2022 10:54), Max: 36.8 (24 Mar 2022 11:57)  T(F): 97 (25 Mar 2022 10:54), Max: 98.2 (24 Mar 2022 11:57)  HR: 49 (25 Mar 2022 11:24) (45 - 91)  BP: 109/57 (25 Mar 2022 11:24) (105/70 - 139/54)  BP(mean): --  RR: 16 (25 Mar 2022 11:24) (14 - 19)  SpO2: 98% (25 Mar 2022 11:24) (95% - 98%)    CONSTITUTIONAL: NAD, well-developed  RESPIRATORY: Normal respiratory effort; lungs are clear to auscultation bilaterally  CARDIOVASCULAR: Regular rate and rhythm, normal S1 and S2, no murmur/rub/gallop; mild lower extremity edema; Peripheral pulses are 2+ bilaterally  ABDOMEN: Nontender to palpation, normoactive bowel sounds, no rebound/guarding; No hepatosplenomegaly  MUSCLOSKELETAL: no clubbing or cyanosis of digits; no joint swelling or tenderness to palpation  PSYCH: A+O to person, place, and time; affect appropriate    LABS:                        13.1   6.53  )-----------( 306      ( 25 Mar 2022 07:06 )             38.3     03-25    141  |  104  |  12  ----------------------------<  94  3.6   |  20<L>  |  0.73    Ca    9.6      25 Mar 2022 07:06  Phos  3.9     03-25  Mg     2.00     03-25    TPro  7.6  /  Alb  4.0  /  TBili  0.4  /  DBili  x   /  AST  23  /  ALT  18  /  AlkPhos  95  03-23                RADIOLOGY & ADDITIONAL TESTS:  Results Reviewed:   Imaging Personally Reviewed:  Electrocardiogram Personally Reviewed:    COORDINATION OF CARE:  Care Discussed with Consultants/Other Providers [Y/N]:  Prior or Outpatient Records Reviewed [Y/N]:  
PROGRESS NOTE:     Patient is a 72y old  Female who presents with a chief complaint of Chest pain (24 Mar 2022 12:11)      SUBJECTIVE / OVERNIGHT EVENTS: Pt c/o chest discomfort.     ADDITIONAL REVIEW OF SYSTEMS:    MEDICATIONS  (STANDING):  apixaban 5 milliGRAM(s) Oral every 12 hours  atorvastatin 20 milliGRAM(s) Oral at bedtime  colchicine 0.6 milliGRAM(s) Oral every 12 hours  diltiazem    milliGRAM(s) Oral daily  hydrochlorothiazide 12.5 milliGRAM(s) Oral daily  influenza  Vaccine (HIGH DOSE) 0.7 milliLiter(s) IntraMuscular once  losartan 100 milliGRAM(s) Oral daily  oxybutynin XL 10 milliGRAM(s) Oral daily  pantoprazole  Injectable 40 milliGRAM(s) IV Push every 12 hours  senna 2 Tablet(s) Oral at bedtime  sodium chloride 0.9% lock flush 3 milliLiter(s) IV Push every 8 hours    MEDICATIONS  (PRN):  acetaminophen     Tablet .. 650 milliGRAM(s) Oral every 6 hours PRN Temp greater or equal to 38C (100.4F), Mild Pain (1 - 3), Moderate Pain (4 - 6)      CAPILLARY BLOOD GLUCOSE        I&O's Summary      PHYSICAL EXAM:  Vital Signs Last 24 Hrs  T(C): 36.8 (24 Mar 2022 11:57), Max: 36.8 (24 Mar 2022 11:57)  T(F): 98.2 (24 Mar 2022 11:57), Max: 98.2 (24 Mar 2022 11:57)  HR: 67 (24 Mar 2022 11:57) (57 - 85)  BP: 122/63 (24 Mar 2022 11:57) (122/63 - 165/69)  BP(mean): --  RR: 18 (24 Mar 2022 11:57) (14 - 18)  SpO2: 98% (24 Mar 2022 11:57) (98% - 100%)    CONSTITUTIONAL: NAD, well-developed  RESPIRATORY: Normal respiratory effort; lungs are clear to auscultation bilaterally  CARDIOVASCULAR: Regular rate and rhythm, normal S1 and S2, no murmur/rub/gallop; mild lower extremity edema; Peripheral pulses are 2+ bilaterally  ABDOMEN: Nontender to palpation, normoactive bowel sounds, no rebound/guarding; No hepatosplenomegaly  MUSCLOSKELETAL: no clubbing or cyanosis of digits; no joint swelling or tenderness to palpation  PSYCH: A+O to person, place, and time; affect appropriate    LABS:                        13.7   9.01  )-----------( 291      ( 23 Mar 2022 15:43 )             40.8     03-23    141  |  104  |  17  ----------------------------<  99  4.0   |  24  |  0.66    Ca    9.4      23 Mar 2022 15:43    TPro  7.6  /  Alb  4.0  /  TBili  0.4  /  DBili  x   /  AST  23  /  ALT  18  /  AlkPhos  95  03-23                RADIOLOGY & ADDITIONAL TESTS:  Results Reviewed:   Imaging Personally Reviewed:  Electrocardiogram Personally Reviewed:    COORDINATION OF CARE:  Care Discussed with Consultants/Other Providers [Y/N]:  Prior or Outpatient Records Reviewed [Y/N]:

## 2022-03-27 NOTE — PROGRESS NOTE ADULT - ASSESSMENT
72 year old woman with a pmhx of HTN, HLD, HARIS (on CPAP QHS), pericarditis (aged 26 ad 29), migraine depression, Fibromyalgia, GERD, COVID-19, and symptomatic persistent Afib (on Eliquis and metoprolol) s/p recent Afib ablation on 3/10/2022 and discharge on colchicine who presented today with worsening CP that radiated down the left arm and back. Patient rated the pain as a 9/10 which lasted for 2-3 hours. It was associated with dizziness, SOB, nausea, and chills.    chest pain likely 2/2 thermal injury to esophagus  f/u GI recs  Chadsvasc 3 (age, sex, HTN)  Continue Eliquis 5 mg po BID  Continue PPI pantoprazole, Colchicine.     Natan Meza  Cardiology Fellow    All Cardiology service information can be found 24/7 on [email or web address], password: [email or web address]

## 2022-03-27 NOTE — PROGRESS NOTE ADULT - PROBLEM SELECTOR PLAN 3
- s/p recent ablation, currently in sinus rhythm   - c/w Eliquis given her elevated CHADSVASC score  - c/w Diltiazem, reports allergy to Metoprolol

## 2022-03-27 NOTE — PROGRESS NOTE ADULT - PROBLEM SELECTOR PLAN 1
- Atypical chest pain, likely GI etiology   - Troponin negative   - No ischemic changes on EKG   - CTA C/A/P negative for dissection   - TTE 3/23 w/ grossly normal LV systolic function, normal RV, no pericardial effusion   - c/w PPI and Carafate as below
- Atypical chest pain, likely GI etiology   - Troponin negative   - No ischemic changes on EKG   - CTA C/A/P negative for dissection   - TTE 3/23 w/ grossly normal LV systolic function, normal RV, no pericardial effusion   - c/w PPI and Carafate as below
- Atypical chest pain, likely GI etiology   - Troponin negative   - No ischemic changes on EKG   - CTA C/A/P negative for dissection   - Obtain TTE   - c/w PPI
- Atypical chest pain, likely GI etiology   - Troponin negative   - No ischemic changes on EKG   - CTA C/A/P negative for dissection   - TTE 3/23 w/ grossly normal LV systolic function, normal RV, no pericardial effusion   - c/w PPI and Carafate as below

## 2022-03-27 NOTE — PROGRESS NOTE ADULT - PROBLEM SELECTOR PLAN 7
- Continue Colchicine po BID x 14 more days as per cardiology

## 2022-03-27 NOTE — DISCHARGE NOTE NURSING/CASE MANAGEMENT/SOCIAL WORK - PATIENT PORTAL LINK FT
You can access the FollowMyHealth Patient Portal offered by [facility] by registering at the following website: [email or web address]. By joining [employer]’s FollowMyHealth portal, you will also be able to view your health information using other applications (apps) compatible with our system.

## 2022-04-14 ENCOUNTER — NON-APPOINTMENT (OUTPATIENT)
Age: 72
End: 2022-04-14

## 2022-04-14 ENCOUNTER — APPOINTMENT (OUTPATIENT)
Dept: ELECTROPHYSIOLOGY | Facility: CLINIC | Age: 72
End: 2022-04-14
Payer: MEDICARE

## 2022-04-14 VITALS
OXYGEN SATURATION: 96 % | TEMPERATURE: 97.8 F | HEIGHT: 62 IN | HEART RATE: 53 BPM | WEIGHT: 196 LBS | DIASTOLIC BLOOD PRESSURE: 84 MMHG | BODY MASS INDEX: 36.07 KG/M2 | SYSTOLIC BLOOD PRESSURE: 149 MMHG

## 2022-04-14 VITALS — DIASTOLIC BLOOD PRESSURE: 76 MMHG | SYSTOLIC BLOOD PRESSURE: 119 MMHG

## 2022-04-14 PROCEDURE — 99214 OFFICE O/P EST MOD 30 MIN: CPT

## 2022-04-14 PROCEDURE — 93000 ELECTROCARDIOGRAM COMPLETE: CPT

## 2022-04-14 RX ORDER — SUCRALFATE 1 G/10ML
1 SUSPENSION ORAL 4 TIMES DAILY
Refills: 0 | Status: ACTIVE | COMMUNITY
Start: 2022-04-14

## 2022-04-14 RX ORDER — IBUPROFEN 800 MG/1
TABLET, FILM COATED ORAL
Refills: 0 | Status: DISCONTINUED | COMMUNITY
End: 2022-04-14

## 2022-04-14 RX ORDER — POLYETHYLENE GLYCOL 3350 17 G/17G
17 POWDER, FOR SOLUTION ORAL DAILY
Qty: 1 | Refills: 0 | Status: DISCONTINUED | COMMUNITY
Start: 2021-07-14 | End: 2022-04-14

## 2022-04-14 RX ORDER — PREDNISONE 10 MG/1
10 TABLET ORAL
Qty: 21 | Refills: 0 | Status: DISCONTINUED | COMMUNITY
Start: 2021-05-10 | End: 2022-04-14

## 2022-04-14 RX ORDER — OXYBUTYNIN CHLORIDE 10 MG/1
10 TABLET, EXTENDED RELEASE ORAL
Qty: 90 | Refills: 0 | Status: DISCONTINUED | COMMUNITY
Start: 2017-10-09 | End: 2022-04-14

## 2022-04-14 NOTE — HISTORY OF PRESENT ILLNESS
[FreeTextEntry1] : Sumi Connolly is a 71y/o woman with Hx of HTN, HLD, HARIS, fibromyalgia, depression, GERD, and persistent afib (first diagnosed in 9/2020), on Eliquis, now s/p PVI, PWI, CTI, and septal micro-reentrant AT ablation on 3/10/2022 who presents today for routine f/u post procedure. Has been doing well post ablation. Does note feeling cold after taking her diltiazem but otherwise no issues. Denies chest pain, palpitations, SOB, syncope or near syncope. Right groin without pain, swelling, or bruising. Remains on Eliquis without s/s of bleeding.

## 2022-04-14 NOTE — CARDIOLOGY SUMMARY
[de-identified] : 9/1/2021: normal LV size and function, mild ischemia, LVEF 65% \par  [de-identified] : 9/23/2021: LV cavity size is normal. LVEF 51%.  [de-identified] :  11/1/2021: non obstructive

## 2022-04-14 NOTE — DISCUSSION/SUMMARY
[FreeTextEntry1] : Impression:\par \par 1. Persistent afib: s/p PVI, PWI, CTI, and septal micro-reentrant AT ablation on 3/10/2022. EKG performed today to assess for presence of recurrent afib and reveals sinus bradycardia. Remains on diltiazem 300mg daily. Could consider decreasing dose given bradycardia and possible intolerance but concern that it will be adverse for her BP as her BP is high. Review of ECHO with LVEF 51%. For now, resume rate control management and Eliquis for thromboembolic prophylaxis. \par \par 2. HTN: resume oral antihypertensives as prescribed. Encouraged heart healthy diet, sodium restriction, and weight loss. Continue regular f/u with Cardiologist for further HTN management.\par \par 3. HLD: resume statin therapy as prescribed and regular f/u with Cardiologist for routine lipid monitoring and management.\par \par 4. HARIS: resume compliance with HARIS management to prevent future sinus node dysfunction and atrial fibrillation. Encouraged weight loss.\par \par Continue f/u with Cardiologist and RTO for f/u in 3 months.

## 2022-05-31 ENCOUNTER — FORM ENCOUNTER (OUTPATIENT)
Age: 72
End: 2022-05-31

## 2022-06-01 ENCOUNTER — APPOINTMENT (OUTPATIENT)
Dept: SLEEP CENTER | Facility: CLINIC | Age: 72
End: 2022-06-01
Payer: MEDICARE

## 2022-06-01 ENCOUNTER — OUTPATIENT (OUTPATIENT)
Dept: OUTPATIENT SERVICES | Facility: HOSPITAL | Age: 72
LOS: 1 days | End: 2022-06-01
Payer: COMMERCIAL

## 2022-06-01 DIAGNOSIS — Z96.651 PRESENCE OF RIGHT ARTIFICIAL KNEE JOINT: Chronic | ICD-10-CM

## 2022-06-01 DIAGNOSIS — Z98.890 OTHER SPECIFIED POSTPROCEDURAL STATES: Chronic | ICD-10-CM

## 2022-06-01 PROCEDURE — 95810 POLYSOM 6/> YRS 4/> PARAM: CPT | Mod: 26

## 2022-06-01 PROCEDURE — 95810 POLYSOM 6/> YRS 4/> PARAM: CPT

## 2022-06-08 ENCOUNTER — NON-APPOINTMENT (OUTPATIENT)
Age: 72
End: 2022-06-08

## 2022-06-17 ENCOUNTER — APPOINTMENT (OUTPATIENT)
Dept: PULMONOLOGY | Facility: CLINIC | Age: 72
End: 2022-06-17
Payer: MEDICARE

## 2022-06-17 VITALS
HEIGHT: 62 IN | BODY MASS INDEX: 35.33 KG/M2 | WEIGHT: 192 LBS | DIASTOLIC BLOOD PRESSURE: 61 MMHG | HEART RATE: 86 BPM | TEMPERATURE: 98.3 F | RESPIRATION RATE: 16 BRPM | SYSTOLIC BLOOD PRESSURE: 100 MMHG

## 2022-06-17 PROCEDURE — 99214 OFFICE O/P EST MOD 30 MIN: CPT

## 2022-06-17 NOTE — ASSESSMENT
[FreeTextEntry1] : This is a 72 year old female with severe HARIS here for a follow-up visit.  Latest repeat sleep study continues to show severe sleep apnea and she has now resumed CPAP therapy.  Her device was reportedly previously set to 10 cm H2O but due to insufficient pressure she increased it to 12 cm H2O and with reported improvement of her symptoms.  The residual AHI is reported as 9.8 on her device.  We will increase setting to 13 cm H2O.  Her device is also over 5 years old so we will place an order for replacement.  Follow-up in 1 month for a data download a compliance check. \par \par

## 2022-06-17 NOTE — PHYSICAL EXAM
[General Appearance - Well Developed] : well developed [Normal Appearance] : normal appearance [Well Groomed] : well groomed [General Appearance - Well Nourished] : well nourished [No Deformities] : no deformities [General Appearance - In No Acute Distress] : no acute distress [Normal Conjunctiva] : the conjunctiva exhibited no abnormalities [III] : III [Apical Impulse] : the apical impulse was normal [Heart Rate And Rhythm] : heart rate was normal and rhythm regular [Heart Sounds] : normal S1 and S2 [Heart Sounds Gallop] : no gallops [] : no respiratory distress [Respiration, Rhythm And Depth] : normal respiratory rhythm and effort [Exaggerated Use Of Accessory Muscles For Inspiration] : no accessory muscle use [Auscultation Breath Sounds / Voice Sounds] : lungs were clear to auscultation bilaterally [Involuntary Movements] : no involuntary movements were seen [Nail Clubbing] : no clubbing of the fingernails [Cyanosis, Localized] : no localized cyanosis [Petechial Hemorrhages (___cm)] : no petechial hemorrhages [Nail Splinter Hemorrhages] : no splinter hemorrhages of the nails [Skin Color & Pigmentation] : normal skin color and pigmentation [No Focal Deficits] : no focal deficits [Oriented To Time, Place, And Person] : oriented to person, place, and time [Impaired Insight] : insight and judgment were intact [Affect] : the affect was normal [Mood] : the mood was normal

## 2022-06-22 NOTE — PROGRESS NOTE ADULT - PROBLEM/PLAN-1
DISPLAY PLAN FREE TEXT
No events overnight. Pt rested. To get second dose of vanco at 8 am and likely to be discharged to continue po abxs. Pt feeling better. no worsening of erythema from marked area.

## 2022-06-23 DIAGNOSIS — G47.33 OBSTRUCTIVE SLEEP APNEA (ADULT) (PEDIATRIC): ICD-10-CM

## 2022-06-27 ENCOUNTER — APPOINTMENT (OUTPATIENT)
Dept: GASTROENTEROLOGY | Facility: CLINIC | Age: 72
End: 2022-06-27
Payer: MEDICARE

## 2022-06-27 VITALS
HEART RATE: 86 BPM | BODY MASS INDEX: 35.36 KG/M2 | HEIGHT: 62 IN | SYSTOLIC BLOOD PRESSURE: 122 MMHG | WEIGHT: 192.13 LBS | DIASTOLIC BLOOD PRESSURE: 74 MMHG | OXYGEN SATURATION: 97 %

## 2022-06-27 DIAGNOSIS — R13.19 OTHER DYSPHAGIA: ICD-10-CM

## 2022-06-27 DIAGNOSIS — K21.9 GASTRO-ESOPHAGEAL REFLUX DISEASE W/OUT ESOPHAGITIS: ICD-10-CM

## 2022-06-27 DIAGNOSIS — K22.10 ULCER OF ESOPHAGUS W/OUT BLEEDING: ICD-10-CM

## 2022-06-27 PROCEDURE — 99214 OFFICE O/P EST MOD 30 MIN: CPT

## 2022-06-27 PROCEDURE — 99204 OFFICE O/P NEW MOD 45 MIN: CPT

## 2022-06-27 NOTE — HISTORY OF PRESENT ILLNESS
[FreeTextEntry1] : Office consultation on 6/27/2022.\par \par The patient is a 72-year-old woman evaluated for consultation following hospitalization for odynophagia at which time an esophageal ulcer was detected.   present throughout interview and examination.   supplemented much of history and indicated the patient has some degree of memory impairment.\par \par Evaluated during hospitalization and had EGD on 3/25/2022 for complaints of odynophagia that occurred 2 days after undergoing RFA treatment by EPS for atrial fibrillation.  Examination esophagus was noted for an esophageal ulcer measuring approximately 8 mm located at 27 cm from the incisors.  Esophagus was otherwise normal.  Multiple small gastric polyps were noted suggestive of gastric fundic gland polyps.  Examination of stomach otherwise normal.  Visualized duodenum normal except for incidental finding of 1 small vascular ectasia in second portion of duodenum.  Patient had already been on a maintenance regimen of PPI antisecretory therapy and while hospitalized was treated for sucralfate suspension 10 mL 3 times daily for 10-day treatment course.  Esophageal ulcer etiology attributed to RFA treatment.\par \par Symptomatically improved at the current time.  However, patient still has ongoing esophageal symptoms.  In addition, patient and  indicates that she has had esophageal symptoms for at least several years preceding this episode.\par \par History of GERD.  Previously had experienced heartburn and typical retrosternal burning.  Had been on a regimen of Prevacid prescribed by PMD.  Previously had experience complaints of indigestion prompting that treatment.\par \par For the past several years patient has been experiencing dysphagia.  Questionable duration– indicates symptoms of 1 year duration but wife indicates maybe at least several years.  Described as intermittent complaints of solid food dysphagia such that she can experience sensation of retrosternal pain and "stuck feeling" following items such as meat, potato etc.  Typically swallows liquids to facilitate bolus passage.  Never had episode of bolus obstruction requiring endoscopic therapy.  Does not have to retch bolus out.  No dysphagia to liquids although occasionally can have post deglutitive choking after swallowing liquid.\par \par Although improved from symptoms noted at time of hospitalization patient is still symptomatic.  Continues to experience swallowing symptoms such that she will get retrosternal discomfort and sense of bolus hang up that occurs intermittently.  Pain less severe and described more as a discomfort at the current time.\par \par Currently is on a regimen of pantoprazole 40 mg twice daily (curiously takes 40 mg tablet in the morning and 40 mg packet in the evening).  Can experience sour brash.  Episodes of breakthrough heartburn are infrequent which occur approximately once per week described as typical retrosternal burning and often provoked by spicy foods.  Reports no current complaints of regurgitation, nausea, vomiting or abdominal pain.  Can experience feeling of early satiety.\par \par Denies fever.  Appetite poor.  Indicates 35 pound weight decrease over the past 1-1/2-year.\par \par The patient typically has formed [place] 2 bowel movements every 2 to 3 days.  Denies rectal bleeding.\par \par Patient indicates last colonoscopy was approximately 5 or 6 years ago which she believes was normal except for possible fissure.  Does not report any colonic polyps.\par \par Patient had previously undergone upper endoscopy on 4/23/2018 with listed indication being bariatric surgery evaluation.  Examination of the esophagus was normal.  Gastric examination noted for mild erythema of gastric body and gastric antrum.  Visualized duodenum normal.  Apparently patient never underwent bariatric surgery.\par \par Patient reports no other history of digestive illness except as noted.  Family history colon cancer not reported.  Father apparently had lung cancer and unclear if he also had esophagus cancer.

## 2022-06-27 NOTE — PHYSICAL EXAM
[General Appearance - Alert] : alert [General Appearance - In No Acute Distress] : in no acute distress [General Appearance - Well Developed] : well developed [General Appearance - Well-Appearing] : healthy appearing [Sclera] : the sclera and conjunctiva were normal [Neck Appearance] : the appearance of the neck was normal [Neck Cervical Mass (___cm)] : no neck mass was observed [Respiration, Rhythm And Depth] : normal respiratory rhythm and effort [Exaggerated Use Of Accessory Muscles For Inspiration] : no accessory muscle use [Auscultation Breath Sounds / Voice Sounds] : lungs were clear to auscultation bilaterally [Heart Rate And Rhythm] : heart rate was normal and rhythm regular [Heart Sounds] : normal S1 and S2 [Heart Sounds Gallop] : no gallops [Murmurs] : no murmurs [Heart Sounds Pericardial Friction Rub] : no pericardial rub [Edema] : there was no peripheral edema [Bowel Sounds] : normal bowel sounds [Abdomen Soft] : soft [Abdomen Tenderness] : non-tender [] : no hepato-splenomegaly [Abdomen Mass (___ Cm)] : no abdominal mass palpated [Abdomen Hernia] : no hernia was discovered [Cervical Lymph Nodes Enlarged Posterior Bilaterally] : posterior cervical [Cervical Lymph Nodes Enlarged Anterior Bilaterally] : anterior cervical [Supraclavicular Lymph Nodes Enlarged Bilaterally] : supraclavicular [No CVA Tenderness] : no ~M costovertebral angle tenderness [Abnormal Walk] : normal gait [Nail Clubbing] : no clubbing  or cyanosis of the fingernails [Skin Color & Pigmentation] : normal skin color and pigmentation [Oriented To Time, Place, And Person] : oriented to person, place, and time [Impaired Insight] : insight and judgment were intact [Affect] : the affect was normal [Mood] : the mood was normal [FreeTextEntry1] : The abdomen is obese, soft, nontender, nondistended and without mass or hepatosplenomegaly.

## 2022-06-27 NOTE — CONSULT LETTER
[Dear  ___] : Dear  [unfilled], [Consult Letter:] : I had the pleasure of evaluating your patient, [unfilled]. [( Thank you for referring [unfilled] for consultation for _____ )] : Thank you for referring [unfilled] for consultation for [unfilled] [Please see my note below.] : Please see my note below. [Consult Closing:] : Thank you very much for allowing me to participate in the care of this patient.  If you have any questions, please do not hesitate to contact me. [Sincerely,] : Sincerely, [FreeTextEntry2] : Dr. Amberly Johnson [FreeTextEntry3] : Tin Hoyos MD

## 2022-06-27 NOTE — REVIEW OF SYSTEMS
[Feeling Poorly] : feeling poorly [Recent Weight Loss (___ Lbs)] : recent [unfilled] ~Ulb weight loss [Eyesight Problems] : eyesight problems [Shortness Of Breath] : shortness of breath [Cough] : cough [As Noted in HPI] : as noted in HPI [Incontinence] : incontinence [Arthralgias] : arthralgias [Dizziness] : dizziness [Anxiety] : anxiety [Depression] : depression [Negative] : Heme/Lymph [FreeTextEntry3] : Cataracts. [FreeTextEntry4] : Allergies, hayfever. [FreeTextEntry9] : Joint, neck, back pain. [de-identified] : Numbness hands and legs.

## 2022-06-27 NOTE — REASON FOR VISIT
[Post Hospitalization] : a post hospitalization visit [Spouse] : spouse [FreeTextEntry1] : for evaluation of dysphagia.

## 2022-07-14 ENCOUNTER — RX RENEWAL (OUTPATIENT)
Age: 72
End: 2022-07-14

## 2022-07-21 ENCOUNTER — APPOINTMENT (OUTPATIENT)
Dept: PULMONOLOGY | Facility: CLINIC | Age: 72
End: 2022-07-21

## 2022-07-21 VITALS
HEIGHT: 62 IN | BODY MASS INDEX: 35.33 KG/M2 | RESPIRATION RATE: 16 BRPM | HEART RATE: 57 BPM | WEIGHT: 192 LBS | DIASTOLIC BLOOD PRESSURE: 78 MMHG | TEMPERATURE: 97.8 F | SYSTOLIC BLOOD PRESSURE: 131 MMHG

## 2022-07-21 PROCEDURE — 99214 OFFICE O/P EST MOD 30 MIN: CPT

## 2022-07-21 NOTE — ASSESSMENT
[FreeTextEntry1] : This is a 72 year old female with severe HARIS here for a follow-up visit.  She does not have the device with her and therapeutic data is unavailable though her U.Gene.us vidal shows that her usage has been adequate.  She is scheduled to repeat receive a new APAP device which is set to 10 - 20 cm H2O.  We will schedule a follow-up visit to ensure that her sleep apnea is well controlled.  A mask fitting was performed today.  Follow-up in 3 month for a data download a compliance check. \par \par

## 2022-07-21 NOTE — PROCEDURE
[FreeTextEntry1] : Patient is currently using a Resmed Airtouch F20 full face mask and reports excessive leak when she sleeps on her side. She was fitted today with a RespirAccelaloxs Dreamwear Full Face Small frame, Small cushion. The mask was a good fit and patient found it very comfortable. She was instructed on how to don/doff and adjust the mask as needed for potential leak. DME order will be placed for the new mask.

## 2022-07-21 NOTE — HISTORY OF PRESENT ILLNESS
[FreeTextEntry1] : 72 year old female with severe HARIS on CPAP here for a follow up visit.\par \par Comorbid medical conditions include hypertension, atrial fibrillation, asthma, GERD, fibromyalgia, anxiety/depression.\par \par HST - ApneaLink (5/30/2018) showed an AHI of 35/hr. CPAP titration (4/26/2018) showed an optimal pressure of 15 cm H2O.  A repeat study was performed given 20 pounds weight loss since the time time of HARIS diagnosis.  PSG (6/1/2022) showed an AHI of 43.5/hr. Last visit, her device was increased to 13 cm H2O and she felt that her sleep and breathing improved.  An order for a replacement device was placed and she is scheduled to receive it shortly.  Prior DME company is [employer].

## 2022-07-21 NOTE — REVIEW OF SYSTEMS
[Obesity] : obesity [Nocturia] : nocturia [EDS: ESS=____] : no daytime somnolence [Nasal Congestion] : no nasal congestion [A.M. Headache] : no headache present upon awakening [Heartburn] : no heartburn

## 2022-07-25 ENCOUNTER — APPOINTMENT (OUTPATIENT)
Dept: ELECTROPHYSIOLOGY | Facility: CLINIC | Age: 72
End: 2022-07-25

## 2022-07-25 ENCOUNTER — NON-APPOINTMENT (OUTPATIENT)
Age: 72
End: 2022-07-25

## 2022-07-25 VITALS
WEIGHT: 198 LBS | OXYGEN SATURATION: 96 % | HEART RATE: 56 BPM | BODY MASS INDEX: 36.44 KG/M2 | SYSTOLIC BLOOD PRESSURE: 159 MMHG | DIASTOLIC BLOOD PRESSURE: 80 MMHG | HEIGHT: 62 IN

## 2022-07-25 DIAGNOSIS — Z86.79 OTHER SPECIFIED POSTPROCEDURAL STATES: ICD-10-CM

## 2022-07-25 DIAGNOSIS — R53.81 OTHER MALAISE: ICD-10-CM

## 2022-07-25 DIAGNOSIS — Z98.890 OTHER SPECIFIED POSTPROCEDURAL STATES: ICD-10-CM

## 2022-07-25 DIAGNOSIS — R00.2 PALPITATIONS: ICD-10-CM

## 2022-07-25 DIAGNOSIS — I48.19 OTHER PERSISTENT ATRIAL FIBRILLATION: ICD-10-CM

## 2022-07-25 DIAGNOSIS — E78.00 PURE HYPERCHOLESTEROLEMIA, UNSPECIFIED: ICD-10-CM

## 2022-07-25 DIAGNOSIS — R53.83 OTHER MALAISE: ICD-10-CM

## 2022-07-25 DIAGNOSIS — I10 ESSENTIAL (PRIMARY) HYPERTENSION: ICD-10-CM

## 2022-07-25 PROCEDURE — 99215 OFFICE O/P EST HI 40 MIN: CPT | Mod: 25

## 2022-07-25 PROCEDURE — 93000 ELECTROCARDIOGRAM COMPLETE: CPT

## 2022-07-25 RX ORDER — PREDNISOLONE ACETATE 10 MG/ML
1 SUSPENSION/ DROPS OPHTHALMIC
Qty: 5 | Refills: 0 | Status: DISCONTINUED | COMMUNITY
Start: 2022-07-20

## 2022-07-25 RX ORDER — OFLOXACIN 3 MG/ML
0.3 SOLUTION/ DROPS OPHTHALMIC
Qty: 5 | Refills: 0 | Status: DISCONTINUED | COMMUNITY
Start: 2022-07-20

## 2022-07-25 RX ORDER — DILTIAZEM HYDROCHLORIDE 180 MG/1
180 CAPSULE, EXTENDED RELEASE ORAL
Qty: 90 | Refills: 3 | Status: ACTIVE | COMMUNITY

## 2022-07-25 RX ORDER — DICLOFENAC SODIUM 1 MG/ML
0.1 SOLUTION OPHTHALMIC
Qty: 5 | Refills: 0 | Status: DISCONTINUED | COMMUNITY
Start: 2022-07-20

## 2022-07-25 NOTE — HISTORY OF PRESENT ILLNESS
[FreeTextEntry1] : Sumi Connolly is a 73y/o woman with Hx of HTN, HLD, HARIS, fibromyalgia, depression, GERD, and persistent afib (first diagnosed in 9/2020), on Eliquis, now s/p PVI, PWI, CTI, and septal micro-reentrant AT ablation on 3/10/2022 who presents today for routine f/u. Has been doing well post ablation. Has had some recurrent afib and palpitations, lasting a few hours in duration. Also experiencing episodes of weakness and feeling lightheaded. Has been on diltiazem 180mg daily. Currently undergoing 2 week Holter. Denies chest pain, SOB, syncope or near syncope. Remains on Eliquis without s/s of bleeding.

## 2022-07-25 NOTE — CARDIOLOGY SUMMARY
[de-identified] : 9/1/2021: normal LV size and function, mild ischemia, LVEF 65% \par  [de-identified] : 9/23/2021: LV cavity size is normal. LVEF 51%.  [de-identified] :  11/1/2021: non obstructive

## 2022-07-25 NOTE — DISCUSSION/SUMMARY
[EKG obtained to assist in diagnosis and management of assessed problem(s)] : EKG obtained to assist in diagnosis and management of assessed problem(s) [FreeTextEntry1] : Impression:\par \par 1. Persistent afib: s/p PVI, PWI, CTI, and septal micro-reentrant AT ablation on 3/10/2022. EKG performed today to assess for presence of recurrent afib and reveals sinus bradycardia. Remains on diltiazem 180mg daily. Review of ECHO with LVEF 51%. For now, resume rate control management and Eliquis for thromboembolic prophylaxis. Undergoing Holter with Cardiologist to assess for recurrent afib or other tachy or bradyarrhythmias given symptoms of palpitations and weakness. \par \par 2. HTN: resume oral antihypertensives as prescribed. Encouraged heart healthy diet, sodium restriction, and weight loss. Continue regular f/u with Cardiologist for further HTN management.\par \par 3. HLD: resume statin therapy as prescribed and regular f/u with Cardiologist for routine lipid monitoring and management.\par \par 4. HARIS: resume compliance with HARIS management to prevent future sinus node dysfunction and atrial fibrillation. Encouraged weight loss.\par \par Continue f/u with Cardiologist and RTO for f/u in 3 months.

## 2022-07-26 NOTE — PATIENT PROFILE ADULT - OVER THE PAST TWO WEEKS, HAVE YOU FELT LITTLE INTEREST OR PLEASURE IN DOING THINGS?
Initial SW/CM Assessment/Plan of Care Note     Baseline Assessment  78 year old admitted 7/24/2022 as Inpatient.  Prior to admission patient was living with Alone and residing at Assisted living.  Patient’s Primary Care Provider is Iam Lee MD (retired), per dtr, pt has a new pt appointment w/Dr. Alana Roman ([phone]) to establish primary care.        Prior to Admission Status  Functional Status  Ambulation: Independent/Self  Medication Preparation: Home Care Setup  Medication Administration: Staff Administered  Transportation: Family  Functional Status Comments: Independent at baseline per dtr    Agency/Support  Type of Services Prior to Hospitalization: Housekeeping  Support Systems: Children, Home Care Staff  Home Devices/Equipment: Mobility assist device  Mobility Assist Devices: Four wheel walker  Sensory Support Devices: Eyeglasses, Hearing aids, Dentures      Barriers to Discharge  Identified Barriers to Discharge/Transition Planning:  (pending home health arrangement)    Progress Note  Pt w/PMHx hypertension, diabetes, ankle fracture s/p ORIF,cholecystectomy presented for ankle wounds.Ortho and ID on consult. No strong evidence of new infection at this time, pt w/chronic wounds. PT/OT cleared to return to [facility]. Dispo: home on oral abx w/home health service RN for wound care.     Discharge plan discussed with pt and dtr Marie, who states pt has a foot wound appointment on 08/03.     Patient/Family stated discharge goal (s):  Patient/Family Discharge Goal: Home Care    Plan  SW/CM - Recommendations for Discharge: Home care     Refer to SW/CM Flowsheet for Goals and objective data.          no

## 2022-08-12 ENCOUNTER — APPOINTMENT (OUTPATIENT)
Dept: RADIOLOGY | Facility: HOSPITAL | Age: 72
End: 2022-08-12

## 2022-08-12 ENCOUNTER — OUTPATIENT (OUTPATIENT)
Dept: OUTPATIENT SERVICES | Facility: HOSPITAL | Age: 72
LOS: 1 days | End: 2022-08-12

## 2022-08-12 DIAGNOSIS — Z98.890 OTHER SPECIFIED POSTPROCEDURAL STATES: Chronic | ICD-10-CM

## 2022-08-12 DIAGNOSIS — R13.19 OTHER DYSPHAGIA: ICD-10-CM

## 2022-08-12 DIAGNOSIS — Z96.651 PRESENCE OF RIGHT ARTIFICIAL KNEE JOINT: Chronic | ICD-10-CM

## 2022-08-12 PROCEDURE — 74220 X-RAY XM ESOPHAGUS 1CNTRST: CPT | Mod: 26

## 2022-08-22 ENCOUNTER — INPATIENT (INPATIENT)
Facility: HOSPITAL | Age: 72
LOS: 7 days | Discharge: HOME | End: 2022-08-30
Attending: INTERNAL MEDICINE | Admitting: INTERNAL MEDICINE
Payer: MEDICARE

## 2022-08-22 VITALS
SYSTOLIC BLOOD PRESSURE: 157 MMHG | HEART RATE: 58 BPM | OXYGEN SATURATION: 99 % | TEMPERATURE: 98 F | RESPIRATION RATE: 18 BRPM | HEIGHT: 63 IN | DIASTOLIC BLOOD PRESSURE: 67 MMHG

## 2022-08-22 DIAGNOSIS — Z98.890 OTHER SPECIFIED POSTPROCEDURAL STATES: Chronic | ICD-10-CM

## 2022-08-22 DIAGNOSIS — Z96.651 PRESENCE OF RIGHT ARTIFICIAL KNEE JOINT: Chronic | ICD-10-CM

## 2022-08-22 LAB
ALBUMIN SERPL ELPH-MCNC: 3.9 G/DL — SIGNIFICANT CHANGE UP (ref 3.3–5)
ALP SERPL-CCNC: 90 U/L — SIGNIFICANT CHANGE UP (ref 40–120)
ALT FLD-CCNC: 14 U/L — SIGNIFICANT CHANGE UP (ref 4–33)
ANION GAP SERPL CALC-SCNC: 15 MMOL/L — HIGH (ref 7–14)
APTT BLD: 35.3 SEC — SIGNIFICANT CHANGE UP (ref 27–36.3)
AST SERPL-CCNC: 33 U/L — HIGH (ref 4–32)
BASOPHILS # BLD AUTO: 0.08 K/UL — SIGNIFICANT CHANGE UP (ref 0–0.2)
BASOPHILS NFR BLD AUTO: 0.7 % — SIGNIFICANT CHANGE UP (ref 0–2)
BILIRUB SERPL-MCNC: <0.2 MG/DL — SIGNIFICANT CHANGE UP (ref 0.2–1.2)
BUN SERPL-MCNC: 18 MG/DL — SIGNIFICANT CHANGE UP (ref 7–23)
CALCIUM SERPL-MCNC: 9.4 MG/DL — SIGNIFICANT CHANGE UP (ref 8.4–10.5)
CHLORIDE SERPL-SCNC: 103 MMOL/L — SIGNIFICANT CHANGE UP (ref 98–107)
CO2 SERPL-SCNC: 19 MMOL/L — LOW (ref 22–31)
CREAT SERPL-MCNC: 0.68 MG/DL — SIGNIFICANT CHANGE UP (ref 0.5–1.3)
EGFR: 92 ML/MIN/1.73M2 — SIGNIFICANT CHANGE UP
EOSINOPHIL # BLD AUTO: 0.32 K/UL — SIGNIFICANT CHANGE UP (ref 0–0.5)
EOSINOPHIL NFR BLD AUTO: 2.8 % — SIGNIFICANT CHANGE UP (ref 0–6)
GLUCOSE SERPL-MCNC: 108 MG/DL — HIGH (ref 70–99)
HCT VFR BLD CALC: 40.2 % — SIGNIFICANT CHANGE UP (ref 34.5–45)
HGB BLD-MCNC: 12.8 G/DL — SIGNIFICANT CHANGE UP (ref 11.5–15.5)
IANC: 6.52 K/UL — SIGNIFICANT CHANGE UP (ref 1.8–7.4)
IMM GRANULOCYTES NFR BLD AUTO: 0.4 % — SIGNIFICANT CHANGE UP (ref 0–1.5)
INR BLD: 1.09 RATIO — SIGNIFICANT CHANGE UP (ref 0.88–1.16)
LYMPHOCYTES # BLD AUTO: 27.7 % — SIGNIFICANT CHANGE UP (ref 13–44)
LYMPHOCYTES # BLD AUTO: 3.11 K/UL — SIGNIFICANT CHANGE UP (ref 1–3.3)
MCHC RBC-ENTMCNC: 28.8 PG — SIGNIFICANT CHANGE UP (ref 27–34)
MCHC RBC-ENTMCNC: 31.8 GM/DL — LOW (ref 32–36)
MCV RBC AUTO: 90.3 FL — SIGNIFICANT CHANGE UP (ref 80–100)
MONOCYTES # BLD AUTO: 1.16 K/UL — HIGH (ref 0–0.9)
MONOCYTES NFR BLD AUTO: 10.3 % — SIGNIFICANT CHANGE UP (ref 2–14)
NEUTROPHILS # BLD AUTO: 6.52 K/UL — SIGNIFICANT CHANGE UP (ref 1.8–7.4)
NEUTROPHILS NFR BLD AUTO: 58.1 % — SIGNIFICANT CHANGE UP (ref 43–77)
NRBC # BLD: 0 /100 WBCS — SIGNIFICANT CHANGE UP (ref 0–0)
NRBC # FLD: 0 K/UL — SIGNIFICANT CHANGE UP (ref 0–0)
NT-PROBNP SERPL-SCNC: 173 PG/ML — SIGNIFICANT CHANGE UP
PLATELET # BLD AUTO: 340 K/UL — SIGNIFICANT CHANGE UP (ref 150–400)
POTASSIUM SERPL-MCNC: 5.7 MMOL/L — HIGH (ref 3.5–5.3)
POTASSIUM SERPL-SCNC: 5.7 MMOL/L — HIGH (ref 3.5–5.3)
PROT SERPL-MCNC: 7.6 G/DL — SIGNIFICANT CHANGE UP (ref 6–8.3)
PROTHROM AB SERPL-ACNC: 12.6 SEC — SIGNIFICANT CHANGE UP (ref 10.5–13.4)
RBC # BLD: 4.45 M/UL — SIGNIFICANT CHANGE UP (ref 3.8–5.2)
RBC # FLD: 13.2 % — SIGNIFICANT CHANGE UP (ref 10.3–14.5)
SODIUM SERPL-SCNC: 137 MMOL/L — SIGNIFICANT CHANGE UP (ref 135–145)
TROPONIN T, HIGH SENSITIVITY RESULT: 7 NG/L — SIGNIFICANT CHANGE UP
WBC # BLD: 11.23 K/UL — HIGH (ref 3.8–10.5)
WBC # FLD AUTO: 11.23 K/UL — HIGH (ref 3.8–10.5)

## 2022-08-22 PROCEDURE — 70498 CT ANGIOGRAPHY NECK: CPT | Mod: 26,MA

## 2022-08-22 PROCEDURE — 99285 EMERGENCY DEPT VISIT HI MDM: CPT | Mod: 25

## 2022-08-22 PROCEDURE — 71045 X-RAY EXAM CHEST 1 VIEW: CPT | Mod: 26

## 2022-08-22 PROCEDURE — 70496 CT ANGIOGRAPHY HEAD: CPT | Mod: 26,MA

## 2022-08-22 PROCEDURE — 93010 ELECTROCARDIOGRAM REPORT: CPT

## 2022-08-22 NOTE — ED PROVIDER NOTE - CLINICAL SUMMARY MEDICAL DECISION MAKING FREE TEXT BOX
Pt with hx of cva on eliquis for AF here s/p numerous falls for feeling off balance. Started a few days ago, not in stroke code window. Describes sx concerning for central etiology - constant, not provoked, no room spinning sensation, worse on L side. Will need scans with angio to eval posterior circulation and even if negative have high suspicion so will need mri for further eval. Chest pain appears unrelated, non pleuritic, non exertional, unclear cause but do not suspect pe, dissection (will have cta of neck to eval carotids and aortic arch), will get trop to eval. UA for frequency. dispo likely admit or minimum cdu.

## 2022-08-22 NOTE — ED PROVIDER NOTE - PHYSICAL EXAMINATION
Vitals: I have reviewed the patients vital signs  General: Well dressed, well appearing, no acute distress  HEENT: Atraumatic, normocephalic, airway patent  Eyes: EOMI, tracking appropriately  Neck: no tracheal deviation, no JVD  Chest/Lungs: no trauma, symmetric chest rise, speaking in complete sentences, no WOB  Heart: skin and extremities well perfused, regular rate and rhythm  Neuro: A+Ox3, CN II-XII intact, speech coherent and non dysarthric, mildly ataxic and unable to ambulate without assistance, mild L sided dysmetria. Muscle strength at baseline in all extremities  Eyes: L surgical pupil, EOMI, no nystagmus, no conjunctival injection   MSK: strength at baseline in all extremities, no muscle wasting or atrophy  Skin: no cyanosis, no jaundice, no new emergent lesions

## 2022-08-22 NOTE — ED ADULT TRIAGE NOTE - WILL THE PATIENT ACCEPT THE PFIZER COVID-19 VACCINE IF ELIGIBLE AND IT IS AVAILABLE?
Procedure Information: Please note that the numeric value listed in the Medication (1) and associated J-code units and Medication (2) and associated J-code units variables are j-code amounts and do not represent either the concentration or the total amount of the medications injected.  I strongly recommend selecting no to the Render J-code information in note question. This will allow your note to be more clear. If you are billing j-codes with your injection codes you need to document the total amount of the medication injected. This amount should match the j-code units. For example, if you are injecting Triamcinolone 40mg as an intramuscular injection you would select 40 for the dose field and mg for the units. This would allow you to document  with 4 units (40mg = 10mg x 4). The total volume is not used to calculate j-codes only the amount of the medication administered. No

## 2022-08-22 NOTE — ED PROVIDER NOTE - NS ED ROS FT
Constitutional: (-) fever (-) vomiting  Eyes/ENT: (+) vision changes, (-) hearing changes  Cardiovascular: (+) chest pain, (-) wheezing  Respiratory: (-) cough, (-) shortness of breath  Gastrointestinal: (-) vomiting, (-) diarrhea, (-) abdominal pain  : (-) dysuria   Musculoskeletal: (-) back pain  Integumentary: (-) rash, (-) edema  Neurological: (-)loc +dizziness  Allergic/Immunologic: (-) pruritus  Endocrine: No history of thyroid disease

## 2022-08-22 NOTE — ED ADULT NURSE NOTE - NSIMPLEMENTINTERV_GEN_ALL_ED
Implemented All Fall Risk Interventions:  [place] to call system. Call bell, personal items and telephone within reach. Instruct patient to call for assistance. Room bathroom lighting operational. Non-slip footwear when patient is off stretcher. Physically safe environment: no spills, clutter or unnecessary equipment. Stretcher in lowest position, wheels locked, appropriate side rails in place. Provide visual cue, wrist band, yellow gown, etc. Monitor gait and stability. Monitor for mental status changes and reorient to person, place, and time. Review medications for side effects contributing to fall risk. Reinforce activity limits and safety measures with patient and family.

## 2022-08-22 NOTE — ED ADULT NURSE NOTE - OBJECTIVE STATEMENT
pt received in trauma A. pt is AxOx4 and ambulatory at baseline. pt c/o weakness and off balance, pt states she fell but did denies injury to head and LOC. pt feels forgetful and has memory issues. pt has PMH of CVA, HTN, GERD and afib and is on Eliquis. pt denies chest pain, sob, n/v/d, headaches and trouble urinating. pt RR are even and unlabored. pt NSR on cardiac monitor. pt skin is dry and intact. family at bedside. pt has a 20g in the right AC, labs drawn and sent. safety y measures taken. Will continue to monitor.

## 2022-08-22 NOTE — ED PROVIDER NOTE - OBJECTIVE STATEMENT
73 y/o F htn, patient reports previous CVA with memory deficit, afib on eliquis, here now with 4 days of persistent dizziness "off balance" sensation. Unable to ambulate without assistance, frequent falls, no known LOC, no body pain, no head injury. Presents today because she also developed some mild midcentral chest pain and frequency of urination. Has not had similar sx before. denies room spinning sensation or prokoving factors. No fevers, abd pain, vomiting, loc. Does endorse some R eye floaters/spots.

## 2022-08-22 NOTE — ED ADULT NURSE NOTE - NSICDXPASTMEDICALHX_GEN_ALL_CORE_FT
PAST MEDICAL HISTORY:  Anxiety and depression     Atrial fibrillation persistent in 2022    GERD (gastroesophageal reflux disease)     H/O constipation     H/O fibromyalgia     HTN (Hypertension)     Latex allergy     Migraines     Morbid obesity due to excess calories     Muscle Cramps at Night     HARIS (obstructive sleep apnea) noncompliant with CPAP    Overactive bladder     Patient is Jehovah's witness refuse blood products    Pericarditis around age 30's    Primary osteoarthritis of right knee

## 2022-08-22 NOTE — ED PROVIDER NOTE - ATTENDING CONTRIBUTION TO CARE
71 y/o female [employer] with PMH of HTN, multiple prior CVAs (pt reports asymptomatic), Atrial fibrillation diagnosed on 7/2021 s/p ablation 3/2022 on Eliquis, HARIS w/ CPAP, right knee replacement 2017, presents to ED c/o dizziness and difficulty walking.     The patient is a 72y Female Ten[employer] who has a past medical and surgery history of HTN Persistent AF despite  AV melchor ablation 3/10/22 GERD Pericarditis OSAncw/CPAP OA of right knee Overactive bladder/Bladder Prolapse/repair constipation Fibromyalgia Anxiety and depression  Latex allergy Migraines Rt Breast Lumpectomy Rt TKR Tubal Ligation PTED with dizziness that on further inquiry appears to be dysequilibrium/balance disorder; has needed  to walk about home lists to the left side when no assistance   Vital Signs Stable   PE: as described; my additions and exceptions are noted in the chart  DATA:  EKG: NSB@ 55; Low voltage QRS; NSST changes c/w 3/24/22 NSB is new   RESULTS: All significant/relevant labs and imaging results present during the time of evaluation have been entered into chart through pullset function and are discussed below    MDM:  IMPRESSION: High prob of brainstem stroke   Considerations; Admission for full neuro eval MRI/As and serial exams/education     PLAN  As above  admit to telemetry setting

## 2022-08-22 NOTE — ED PROVIDER NOTE - NSICDXPASTMEDICALHX_GEN_ALL_CORE_FT
PAST MEDICAL HISTORY:  Anxiety and depression     Atrial fibrillation persistent in 2022    GERD (gastroesophageal reflux disease)     H/O constipation     H/O fibromyalgia     HTN (Hypertension)     Latex allergy     Migraines     Morbid obesity due to excess calories     Muscle Cramps at Night     HARIS (obstructive sleep apnea) noncompliant with CPAP    Overactive bladder     Patient is Yarsanism refuse blood products    Pericarditis around age 30's    Primary osteoarthritis of right knee

## 2022-08-23 DIAGNOSIS — R42 DIZZINESS AND GIDDINESS: ICD-10-CM

## 2022-08-23 DIAGNOSIS — K21.9 GASTRO-ESOPHAGEAL REFLUX DISEASE WITHOUT ESOPHAGITIS: ICD-10-CM

## 2022-08-23 DIAGNOSIS — I48.91 UNSPECIFIED ATRIAL FIBRILLATION: ICD-10-CM

## 2022-08-23 DIAGNOSIS — I10 ESSENTIAL (PRIMARY) HYPERTENSION: ICD-10-CM

## 2022-08-23 DIAGNOSIS — Z29.9 ENCOUNTER FOR PROPHYLACTIC MEASURES, UNSPECIFIED: ICD-10-CM

## 2022-08-23 DIAGNOSIS — R07.89 OTHER CHEST PAIN: ICD-10-CM

## 2022-08-23 LAB
A1C WITH ESTIMATED AVERAGE GLUCOSE RESULT: 6 % — HIGH (ref 4–5.6)
ALBUMIN SERPL ELPH-MCNC: 3.7 G/DL — SIGNIFICANT CHANGE UP (ref 3.3–5)
ALP SERPL-CCNC: 91 U/L — SIGNIFICANT CHANGE UP (ref 40–120)
ALT FLD-CCNC: 9 U/L — SIGNIFICANT CHANGE UP (ref 4–33)
ANION GAP SERPL CALC-SCNC: 12 MMOL/L — SIGNIFICANT CHANGE UP (ref 7–14)
APPEARANCE UR: CLEAR — SIGNIFICANT CHANGE UP
AST SERPL-CCNC: 20 U/L — SIGNIFICANT CHANGE UP (ref 4–32)
BACTERIA # UR AUTO: ABNORMAL
BASOPHILS # BLD AUTO: 0.04 K/UL — SIGNIFICANT CHANGE UP (ref 0–0.2)
BASOPHILS NFR BLD AUTO: 0.5 % — SIGNIFICANT CHANGE UP (ref 0–2)
BILIRUB SERPL-MCNC: 0.3 MG/DL — SIGNIFICANT CHANGE UP (ref 0.2–1.2)
BILIRUB UR-MCNC: NEGATIVE — SIGNIFICANT CHANGE UP
BUN SERPL-MCNC: 13 MG/DL — SIGNIFICANT CHANGE UP (ref 7–23)
CALCIUM SERPL-MCNC: 9.5 MG/DL — SIGNIFICANT CHANGE UP (ref 8.4–10.5)
CHLORIDE SERPL-SCNC: 100 MMOL/L — SIGNIFICANT CHANGE UP (ref 98–107)
CHOLEST SERPL-MCNC: 194 MG/DL — SIGNIFICANT CHANGE UP
CO2 SERPL-SCNC: 20 MMOL/L — LOW (ref 22–31)
COLOR SPEC: SIGNIFICANT CHANGE UP
CREAT SERPL-MCNC: 0.62 MG/DL — SIGNIFICANT CHANGE UP (ref 0.5–1.3)
DIFF PNL FLD: ABNORMAL
EGFR: 94 ML/MIN/1.73M2 — SIGNIFICANT CHANGE UP
EOSINOPHIL # BLD AUTO: 0.22 K/UL — SIGNIFICANT CHANGE UP (ref 0–0.5)
EOSINOPHIL NFR BLD AUTO: 2.5 % — SIGNIFICANT CHANGE UP (ref 0–6)
EPI CELLS # UR: SIGNIFICANT CHANGE UP /HPF (ref 0–5)
ESTIMATED AVERAGE GLUCOSE: 126 — SIGNIFICANT CHANGE UP
FLUAV AG NPH QL: SIGNIFICANT CHANGE UP
FLUBV AG NPH QL: SIGNIFICANT CHANGE UP
GLUCOSE SERPL-MCNC: 123 MG/DL — HIGH (ref 70–99)
GLUCOSE UR QL: NEGATIVE — SIGNIFICANT CHANGE UP
GRAN CASTS # UR COMP ASSIST: >50 /LPF — SIGNIFICANT CHANGE UP
HCT VFR BLD CALC: 41.8 % — SIGNIFICANT CHANGE UP (ref 34.5–45)
HDLC SERPL-MCNC: 64 MG/DL — SIGNIFICANT CHANGE UP
HGB BLD-MCNC: 13.2 G/DL — SIGNIFICANT CHANGE UP (ref 11.5–15.5)
IANC: 5.98 K/UL — SIGNIFICANT CHANGE UP (ref 1.8–7.4)
IMM GRANULOCYTES NFR BLD AUTO: 0.3 % — SIGNIFICANT CHANGE UP (ref 0–1.5)
KETONES UR-MCNC: NEGATIVE — SIGNIFICANT CHANGE UP
LEUKOCYTE ESTERASE UR-ACNC: ABNORMAL
LIPID PNL WITH DIRECT LDL SERPL: 110 MG/DL — HIGH
LYMPHOCYTES # BLD AUTO: 1.8 K/UL — SIGNIFICANT CHANGE UP (ref 1–3.3)
LYMPHOCYTES # BLD AUTO: 20.5 % — SIGNIFICANT CHANGE UP (ref 13–44)
MAGNESIUM SERPL-MCNC: 1.9 MG/DL — SIGNIFICANT CHANGE UP (ref 1.6–2.6)
MCHC RBC-ENTMCNC: 28.6 PG — SIGNIFICANT CHANGE UP (ref 27–34)
MCHC RBC-ENTMCNC: 31.6 GM/DL — LOW (ref 32–36)
MCV RBC AUTO: 90.7 FL — SIGNIFICANT CHANGE UP (ref 80–100)
MONOCYTES # BLD AUTO: 0.69 K/UL — SIGNIFICANT CHANGE UP (ref 0–0.9)
MONOCYTES NFR BLD AUTO: 7.9 % — SIGNIFICANT CHANGE UP (ref 2–14)
NEUTROPHILS # BLD AUTO: 5.98 K/UL — SIGNIFICANT CHANGE UP (ref 1.8–7.4)
NEUTROPHILS NFR BLD AUTO: 68.3 % — SIGNIFICANT CHANGE UP (ref 43–77)
NITRITE UR-MCNC: NEGATIVE — SIGNIFICANT CHANGE UP
NON HDL CHOLESTEROL: 130 MG/DL — HIGH
NRBC # BLD: 0 /100 WBCS — SIGNIFICANT CHANGE UP (ref 0–0)
NRBC # FLD: 0 K/UL — SIGNIFICANT CHANGE UP (ref 0–0)
PH UR: 6.5 — SIGNIFICANT CHANGE UP (ref 5–8)
PHOSPHATE SERPL-MCNC: 3.4 MG/DL — SIGNIFICANT CHANGE UP (ref 2.5–4.5)
PLATELET # BLD AUTO: 329 K/UL — SIGNIFICANT CHANGE UP (ref 150–400)
POTASSIUM SERPL-MCNC: 4.2 MMOL/L — SIGNIFICANT CHANGE UP (ref 3.5–5.3)
POTASSIUM SERPL-SCNC: 4.2 MMOL/L — SIGNIFICANT CHANGE UP (ref 3.5–5.3)
PROT SERPL-MCNC: 7.9 G/DL — SIGNIFICANT CHANGE UP (ref 6–8.3)
PROT UR-MCNC: ABNORMAL
RBC # BLD: 4.61 M/UL — SIGNIFICANT CHANGE UP (ref 3.8–5.2)
RBC # FLD: 13.2 % — SIGNIFICANT CHANGE UP (ref 10.3–14.5)
RBC CASTS # UR COMP ASSIST: SIGNIFICANT CHANGE UP /HPF (ref 0–4)
RSV RNA NPH QL NAA+NON-PROBE: SIGNIFICANT CHANGE UP
SARS-COV-2 RNA SPEC QL NAA+PROBE: SIGNIFICANT CHANGE UP
SODIUM SERPL-SCNC: 132 MMOL/L — LOW (ref 135–145)
SP GR SPEC: 1.04 — SIGNIFICANT CHANGE UP (ref 1.01–1.05)
TRIGL SERPL-MCNC: 101 MG/DL — SIGNIFICANT CHANGE UP
TSH SERPL-MCNC: 1.96 UIU/ML — SIGNIFICANT CHANGE UP (ref 0.27–4.2)
UROBILINOGEN FLD QL: SIGNIFICANT CHANGE UP
WBC # BLD: 8.76 K/UL — SIGNIFICANT CHANGE UP (ref 3.8–10.5)
WBC # FLD AUTO: 8.76 K/UL — SIGNIFICANT CHANGE UP (ref 3.8–10.5)
WBC UR QL: SIGNIFICANT CHANGE UP /HPF (ref 0–5)

## 2022-08-23 PROCEDURE — 99223 1ST HOSP IP/OBS HIGH 75: CPT

## 2022-08-23 PROCEDURE — 99233 SBSQ HOSP IP/OBS HIGH 50: CPT

## 2022-08-23 PROCEDURE — 93010 ELECTROCARDIOGRAM REPORT: CPT

## 2022-08-23 RX ORDER — CEFTRIAXONE 500 MG/1
1000 INJECTION, POWDER, FOR SOLUTION INTRAMUSCULAR; INTRAVENOUS EVERY 24 HOURS
Refills: 0 | Status: COMPLETED | OUTPATIENT
Start: 2022-08-23 | End: 2022-08-25

## 2022-08-23 RX ORDER — PANTOPRAZOLE SODIUM 20 MG/1
1 TABLET, DELAYED RELEASE ORAL
Qty: 0 | Refills: 0 | DISCHARGE

## 2022-08-23 RX ORDER — LOSARTAN POTASSIUM 100 MG/1
100 TABLET, FILM COATED ORAL DAILY
Refills: 0 | Status: DISCONTINUED | OUTPATIENT
Start: 2022-08-23 | End: 2022-08-30

## 2022-08-23 RX ORDER — APIXABAN 2.5 MG/1
5 TABLET, FILM COATED ORAL EVERY 12 HOURS
Refills: 0 | Status: DISCONTINUED | OUTPATIENT
Start: 2022-08-23 | End: 2022-08-30

## 2022-08-23 RX ORDER — SENNA PLUS 8.6 MG/1
2 TABLET ORAL AT BEDTIME
Refills: 0 | Status: DISCONTINUED | OUTPATIENT
Start: 2022-08-23 | End: 2022-08-30

## 2022-08-23 RX ORDER — DILTIAZEM HCL 120 MG
300 CAPSULE, EXT RELEASE 24 HR ORAL DAILY
Refills: 0 | Status: DISCONTINUED | OUTPATIENT
Start: 2022-08-23 | End: 2022-08-30

## 2022-08-23 RX ORDER — HYDROCHLOROTHIAZIDE 25 MG
12.5 TABLET ORAL DAILY
Refills: 0 | Status: DISCONTINUED | OUTPATIENT
Start: 2022-08-23 | End: 2022-08-30

## 2022-08-23 RX ORDER — SUCRALFATE 1 G
1 TABLET ORAL EVERY 6 HOURS
Refills: 0 | Status: DISCONTINUED | OUTPATIENT
Start: 2022-08-23 | End: 2022-08-30

## 2022-08-23 RX ORDER — MECLIZINE HCL 12.5 MG
12.5 TABLET ORAL
Refills: 0 | Status: DISCONTINUED | OUTPATIENT
Start: 2022-08-23 | End: 2022-08-30

## 2022-08-23 RX ORDER — ATORVASTATIN CALCIUM 80 MG/1
40 TABLET, FILM COATED ORAL AT BEDTIME
Refills: 0 | Status: DISCONTINUED | OUTPATIENT
Start: 2022-08-23 | End: 2022-08-25

## 2022-08-23 RX ORDER — OXYBUTYNIN CHLORIDE 5 MG
15 TABLET ORAL DAILY
Refills: 0 | Status: DISCONTINUED | OUTPATIENT
Start: 2022-08-23 | End: 2022-08-30

## 2022-08-23 RX ORDER — PANTOPRAZOLE SODIUM 20 MG/1
40 TABLET, DELAYED RELEASE ORAL
Refills: 0 | Status: DISCONTINUED | OUTPATIENT
Start: 2022-08-23 | End: 2022-08-30

## 2022-08-23 RX ADMIN — Medication 1 GRAM(S): at 11:38

## 2022-08-23 RX ADMIN — Medication 1 TABLET(S): at 11:38

## 2022-08-23 RX ADMIN — Medication 1 GRAM(S): at 17:10

## 2022-08-23 RX ADMIN — Medication 12.5 MILLIGRAM(S): at 17:10

## 2022-08-23 RX ADMIN — CEFTRIAXONE 100 MILLIGRAM(S): 500 INJECTION, POWDER, FOR SOLUTION INTRAMUSCULAR; INTRAVENOUS at 11:39

## 2022-08-23 RX ADMIN — LOSARTAN POTASSIUM 100 MILLIGRAM(S): 100 TABLET, FILM COATED ORAL at 17:09

## 2022-08-23 RX ADMIN — Medication 15 MILLIGRAM(S): at 11:38

## 2022-08-23 RX ADMIN — APIXABAN 5 MILLIGRAM(S): 2.5 TABLET, FILM COATED ORAL at 17:10

## 2022-08-23 NOTE — H&P ADULT - HISTORY OF PRESENT ILLNESS
73 y/o female [employer] with PMH of HTN, multiple prior CVAs (pt reports asymptomatic), Atrial fibrillation diagnosed on 7/2021 s/p ablation 3/2022 on Eliquis, HARIS w/ CPAP, right knee replacement 2017, presents to ED c/o dizziness and difficulty walking. Patient states she's been feeling off balance sensation and room spinning for the last month. But sensation of vertigo has increased in the 4 days where her left vision field worsened and has been falling frequently. Change of position worsens sensation. Patient denies fever/chills, fatigue, difficulty breathing, abdominal pain, n/d, change in bowel movements, dysuria, change in urination, lower extremity edema, change in gait, appetite changes, rashes.     In ED, Noncontrast CT Head: No acute intracranial hemorrhage, mass effect, or evidence of acute vascular territorial infarct.   CTA Neck: Mixed calcified and noncalcified atherosclerosis of the carotid bifurcations contributing to mild right and mild to moderate left stenoses at the origins of the internal carotid arteries, respectively.  CTA Head: No proximal large vessel occlusion.           71 y/o female [employer] with PMH of HTN, multiple prior CVAs (pt reports asymptomatic), Atrial fibrillation diagnosed on 7/2021 s/p ablation 3/2022 on Eliquis, HARIS w/ CPAP, right knee replacement 2017, presents to ED c/o dizziness and difficulty walking. Patient states she's been feeling off balance sensation and room spinning for the last month. But sensation of vertigo has increased in the 4 days where her left vision field worsened and has been falling frequently. Change of position worsens sensation. Patient  c/o midsternal chest pain with radiation to left shoulder and arm continuously, worsened with palpitations Patient denies fever/chills, fatigue, difficulty breathing, abdominal pain, n/d, change in bowel movements, dysuria, change in urination, lower extremity edema, change in gait, appetite changes, rashes.     In ED, Noncontrast CT Head: No acute intracranial hemorrhage, mass effect, or evidence of acute vascular territorial infarct.   CTA Neck: Mixed calcified and noncalcified atherosclerosis of the carotid bifurcations contributing to mild right and mild to moderate left stenoses at the origins of the internal carotid arteries, respectively.  CTA Head: No proximal large vessel occlusion.

## 2022-08-23 NOTE — CONSULT NOTE ADULT - ASSESSMENT
Impression:    Recommendations:   72F w/ HTN, multiple prior CVAs (pt reports asymptomatic), afib s/p ablation 3/2022 on eliquis, HARIS w/ CPAP, R knee replacement, Chiari malformation, b/l cataracts (s/p L cataract surgery) presents with intermittent vertigo for 1 month, worse in the last 4 days. Pt describes dizziness as "off balance" sensation, no room spinning, worse with position changes (sitting or standing up) and looking up. Pt reports that the dizziness has been significantly more frequent in the last 4 days causing her fall frequently. No tinnitus, hearing loss, ear pain, or ear fullness. Pt's  also notes that she has been leaning to the left and dropping things from her LUE for the last 2-3 weeks. Pt follows with outpatient neurologist Dr. Chari Sanchez for worsening memory issues for 1 year. Pt reports that she had an MRI 4-5 months ago showing chronic strokes but did not know she had strokes, and Chiari malformation. Pt reports several other symptoms including 35-40 lbs unintentional weight loss over 1.5 years, mild midcentral chest pain for 1 day, and frequency of urination, R eye floaters. Neuro exam notable for L sided sensory loss, finger tapping/hand open/closing slow b/l (L>R), retropulsion, R hemiparesis, few beats of nystagmus fatigable b/l. CTH neg. CTA w/ mild R ICA stenosis, mild-mod L ICA stenosis.     Impression: episodic positional vertigo possibly 2/2 peripheral etiology (ex. BPPV) vs. orthostatic hypotension, r/o central etiology (mass, stroke)    Recommendations:  [] BP measurements in both arms + orthostatic VS  [] EKG to evaluate for arrhythmias  [] consider IVFs  [] Meclizine 12.5mg BID PRN (can increase to 50mg Q8)  [] can try Clonzepam 0.5mg now then Q8H PRN (for 2 to 3 days)  [] refer for Vestibular Rehab on discharge  [] Epley maneuver print out  [] MRI brain w/ and w/o contrast  [] continue home eliquis for secondary stroke prevention given atrial fibrillation  [] increase home atorvastatin 20 to 40mg PO (titrate to LDL <70)  [] check lipid panel, HA1c    Case to be seen and discussed with Dr. Pastor in AM 72F w/ HTN, multiple prior CVAs (pt reports asymptomatic), afib s/p ablation 3/2022 on eliquis, HARIS w/ CPAP, R knee replacement, Chiari malformation, b/l cataracts (s/p L cataract surgery) presents with intermittent vertigo for 1 month, worse in the last 4 days. Pt describes dizziness as "off balance" sensation, no room spinning, worse with position changes (sitting or standing up) and looking up. Pt reports that the dizziness has been significantly more frequent in the last 4 days causing her fall frequently. No tinnitus, hearing loss, ear pain, or ear fullness. Pt's  also notes that she has been leaning to the left and dropping things from her LUE for the last 2-3 weeks. Pt follows with outpatient neurologist Dr. Chari Sanchez for worsening memory issues for 1 year. Pt reports that she had an MRI 4-5 months ago showing chronic strokes but did not know she had strokes, and Chiari malformation. Pt reports several other symptoms including 35-40 lbs unintentional weight loss over 1.5 years, mild midcentral chest pain for 1 day, and frequency of urination, R eye floaters, SOB. Neuro exam notable for L sided sensory loss, finger tapping/hand open/closing slow b/l (L>R), retropulsion, R hemiparesis, few beats of nystagmus fatigable b/l. CTH neg. CTA w/ mild R ICA stenosis, mild-mod L ICA stenosis.     Impression: episodic positional vertigo possibly 2/2 peripheral etiology (ex. BPPV) vs. orthostatic hypotension, r/o central etiology (mass, stroke)    Recommendations:  [] BP measurements in both arms + orthostatic VS  [] EKG to evaluate for arrhythmias  [] consider IVFs  [] Meclizine 12.5mg BID PRN (can increase to 50mg Q8)  [] If refractory to meclizine, can try Clonzepam 0.5mg now then Q8H PRN (for 2 to 3 days)  [] refer for Vestibular Rehab on discharge  [] Epley maneuver print out  [] MRI brain w/ and w/o contrast  [] continue home eliquis for secondary stroke prevention given atrial fibrillation  [] increase home atorvastatin 20 to 40mg PO (titrate to LDL <70)  [] check lipid panel, HA1c    Case to be seen and discussed with Dr. Pastor in AM 72F RH w/ HTN, multiple prior CVAs (pt reports asymptomatic), afib s/p ablation 3/2022 on eliquis, HARIS w/ CPAP, R knee replacement, Chiari malformation, b/l cataracts (s/p L cataract surgery) presents with intermittent vertigo for 1 month, worse in the last 4 days. Pt describes dizziness as "off balance" sensation, no room spinning, worse with position changes (sitting or standing up) and looking up. Pt reports that the dizziness has been significantly more frequent in the last 4 days causing her fall frequently. No tinnitus, hearing loss, ear pain, or ear fullness. Pt's  also notes that she has been leaning to the left and dropping things from her LUE for the last 2-3 weeks. Pt follows with outpatient neurologist Dr. Chari Sanchez for worsening memory issues for 1 year. Pt reports that she had an MRI 4-5 months ago showing chronic strokes but did not know she had strokes, and Chiari malformation. Pt reports several other symptoms including 35-40 lbs unintentional weight loss over 1.5 years, mild midcentral chest pain for 1 day, and frequency of urination, R eye floaters, SOB. Neuro exam notable for L sided sensory loss, finger tapping/hand open/closing slow b/l (L>R), retropulsion, R hemiparesis, few beats of nystagmus fatigable b/l. CTH neg. CTA w/ mild R ICA stenosis, mild-mod L ICA stenosis.     Impression: episodic positional vertigo possibly 2/2 peripheral etiology (ex. BPPV) vs. orthostatic hypotension, r/o central etiology (mass, stroke)    Recommendations:  [] BP measurements in both arms + orthostatic VS  [] EKG to evaluate for arrhythmias  [] consider IVFs  [] Meclizine 12.5mg BID PRN (can increase to 50mg Q8)  [] If refractory to meclizine, can try Clonzepam 0.5mg now then Q8H PRN (for 2 to 3 days)  [] refer for Vestibular Rehab on discharge  [] Epley maneuver print out  [] MRI brain w/ and w/o contrast  [] continue home eliquis for secondary stroke prevention given atrial fibrillation  [] increase home atorvastatin 20 to 40mg PO (titrate to LDL <70)  [] check lipid panel, HA1c    Case to be seen and discussed with Dr. Pastor in AM 72F RH w/ HTN, multiple prior CVAs (pt reports asymptomatic), afib s/p ablation 3/2022 on eliquis, HARIS w/ CPAP, R knee replacement, Chiari malformation, b/l cataracts (s/p L cataract surgery) presents with intermittent vertigo for 1 month, worse in the last 4 days. Pt describes dizziness as "off balance" sensation, no room spinning, worse with position changes (sitting or standing up) and looking up. Pt reports that the dizziness has been significantly more frequent in the last 4 days causing her fall frequently. No tinnitus, hearing loss, ear pain, or ear fullness. Pt's  also notes that she has been leaning to the left and dropping things from her LUE for the last 2-3 weeks. Pt follows with outpatient neurologist Dr. Chari Sanchez for worsening memory issues for 1 year. Pt reports that she had an MRI 4-5 months ago showing chronic strokes but did not know she had strokes, and Chiari malformation. Pt reports several other symptoms including 35-40 lbs unintentional weight loss over 1.5 years, mild midcentral chest pain for 1 day, and frequency of urination, R eye floaters, SOB. Neuro exam notable for L sided sensory loss, finger tapping/hand open/closing slow b/l (L>R), retropulsion, R hemiparesis, few beats of nystagmus fatigable b/l. CTH neg. CTA w/ mild R ICA stenosis, mild-mod L ICA stenosis.     Impression: episodic positional vertigo possibly 2/2 peripheral etiology (ex. BPPV) vs. orthostatic hypotension, r/o central etiology (mass, stroke)    Recommendations:  [] BP measurements in both arms + orthostatic VS  [] EKG to evaluate for arrhythmias  [] consider IVFs  [] Meclizine 12.5mg BID PRN (can increase to 50mg Q8)  [] If refractory to meclizine, can try Clonzepam 0.5mg now then Q8H PRN (for 2 to 3 days)  [] refer for Vestibular Rehab on discharge  [] Epley maneuver print out  [] MRI brain and IACs w/ and w/o contrast  [] continue home eliquis for secondary stroke prevention given atrial fibrillation  [] increase home atorvastatin 20 to 40mg PO (titrate to LDL <70)  [] check lipid panel, HA1c    Case to be seen and discussed with Dr. Pastor in AM

## 2022-08-23 NOTE — H&P ADULT - PROBLEM SELECTOR PLAN 4
- HTN -  Stable  - DASH diet  - c/w home meds w/ holding parameters  - monitor BP & titrate BP meds PRN. Patient with atypical chest pain  Serial EKGs - no acute ischemic changes and cardiac enzymes - troponin 7  Monitor on telemetry  No caffeine  CXR, TTE already ordered

## 2022-08-23 NOTE — CHART NOTE - NSCHARTNOTEFT_GEN_A_CORE
[facility] ACP COVERAGE     Notified by RN that pt c/o chest pain and also would like to s/w provider re: BP medications, pt is unsure if they were restarted and wants to confirm her regimen.   On chart review, pt presented with atypical CP on admission. Admission EKG sinus sharon with no ischemic changes and Trop on admission 7. Pt has been on continuous tele monitoring and has remained NSR with occasional PVC's, no other arrhythmias noted.     Pt seen at bedside, pt states that chest pain is improving, more concerned about the discomfort from her tele leads. Pt states that CP remains left sided, slight pressure with no radiation. Pt denies any associated N/V, headache. Admits to dizziness, which she had presented with.   Pt appears in no acute distress, resting comfortably in bed. Cards: RRR Lungs: CTA B/L.    Vital Signs Last 24 Hrs  T(C): 36.6 (23 Aug 2022 18:56), Max: 36.7 (23 Aug 2022 16:49)  T(F): 97.8 (23 Aug 2022 18:56), Max: 98 (23 Aug 2022 16:49)  HR: 60 (23 Aug 2022 18:56) (53 - 72)  BP: 141/67 (23 Aug 2022 18:56) (141/67 - 179/58)  BP(mean): --  RR: 19 (23 Aug 2022 18:56) (13 - 19)  SpO2: 100% (23 Aug 2022 18:56) (100% - 100%)    Parameters below as of 23 Aug 2022 18:56  Patient On (Oxygen Delivery Method): room air    -Repeat 12 lead EKG with no acute changes  -Will obtain repeat CE if CP worsens  -Orthostatics pending (to be done in AM)  -Home meds were restarted, received losartan-HCTZ, next dose of cardizem for AM. Will be cautious with BP meds given dizziness and awaiting orthos.   -TTE pending     Will continue monitoring closely  MICHELLE Larsen PA-C  [id number]

## 2022-08-23 NOTE — CONSULT NOTE ADULT - SUBJECTIVE AND OBJECTIVE BOX
HPI:  72F w/ HTN, patient reports previous CVA with memory deficit, afib on eliquis, presents with 4 days of dizziness. Pt describes dizziness as "off balance" sensation, no room spinning,  Unable to ambulate without assistance, frequent falls, no known LOC, no body pain, no head injury. Presents today because she also developed some mild midcentral chest pain and frequency of urination. Has not had similar sx before. No fevers, abd pain, vomiting, loc. Does endorse some R eye floaters/spots.    NIHSS:   preMRS:       REVIEW OF SYSTEMS    A 10-system ROS was performed and is negative except for those items noted above and/or in the HPI.    PAST MEDICAL & SURGICAL HISTORY:  HTN (Hypertension)      Pericarditis  around age 30&#x27;s      Muscle Cramps at Night      HARIS (obstructive sleep apnea)  noncompliant with CPAP      Patient is Jehovah&#x27;s Witness  refuse blood products      Primary osteoarthritis of right knee      Morbid obesity due to excess calories      Overactive bladder      H/O constipation      GERD (gastroesophageal reflux disease)      Atrial fibrillation  persistent in       H/O fibromyalgia      Anxiety and depression      Latex allergy      Migraines      Status Post Breast Lumpectomy  right benign      Bladder Prolapse, Acquired  s/p repair      Tubal Ligation      Status post total right knee replacement  10/25/17      S/P AV melchor ablation  3/10/22        FAMILY HISTORY:  Family history of congenital heart disease (Sibling)    Family hx of lung cancer (Father)    FH: breast cancer (Sibling)      SOCIAL HISTORY:   T/E/D:   Occupation:   Lives with:     MEDICATIONS (HOME):  Home Medications:  acetaminophen 325 mg oral tablet: 2 tab(s) orally every 6 hours, As needed, Temp greater or equal to 38C (100.4F), Mild Pain (1 - 3), Moderate Pain (4 - 6) (27 Mar 2022 16:49)  apixaban 5 mg oral tablet: 1 tab(s) orally every 12 hours (27 Mar 2022 18:20)  atorvastatin 20 mg oral tablet: 1 tab(s) orally once a day (at bedtime) (27 Mar 2022 18:23)  dilTIAZem 360 mg/24 hours oral capsule, extended release: 1 cap(s) orally once a day (27 Mar 2022 18:20)  docusate sodium 100 mg oral tablet: 1 tab(s) orally 2 times a day, As Needed (24 Mar 2022 01:08)  lidocaine 2% mucous membrane solution: 15 milliliter(s) mucous membrane every 6 hours, As Needed for ulcer symptoms  (27 Mar 2022 16:49)  losartan-hydrochlorothiazide 100 mg-12.5 mg oral tablet: 1 tab(s) orally once a day PM (24 Mar 2022 01:08)  oxybutynin 10 mg/24 hr oral tablet, extended release: 1 tab(s) orally once a day (27 Mar 2022 16:49)  senna oral tablet: 2 tab(s) orally once a day (at bedtime) (27 Mar 2022 16:49)    MEDICATIONS  (STANDING):    MEDICATIONS  (PRN):    ALLERGIES/INTOLERANCES:  Allergies  adhesives (Rash)  latex (Rash)  metoprolol (Short breath)  nickel allergy- rash (Rash)    Intolerances    VITALS & EXAMINATION:  Vital Signs Last 24 Hrs  T(C): 36.4 (23 Aug 2022 00:39), Max: 36.4 (22 Aug 2022 18:28)  T(F): 97.6 (23 Aug 2022 00:39), Max: 97.6 (22 Aug 2022 18:28)  HR: 53 (23 Aug 2022 00:39) (53 - 59)  BP: 154/61 (23 Aug 2022 00:39) (138/60 - 157/67)  BP(mean): --  RR: 13 (23 Aug 2022 00:39) (13 - 18)  SpO2: 100% (23 Aug 2022 00:39) (99% - 100%)    Parameters below as of 23 Aug 2022 00:39  Patient On (Oxygen Delivery Method): room air      General:  Constitutional: Obese Female, appears stated age, in no apparent distress including pain  Head: Normocephalic & atraumatic.  ENT: Patent ear canals, intact TM, mucus membranes moist & pink, neck supple, no lymphadenopathy.   Respiratory: Patent airway. All lung fields are clear to auscultation bilaterally.  Extremities: No cyanosis, clubbing, or edema.  Skin: No rashes, bruising, or discoloration.    Cardiovascular (>2): RRR no murmurs. Carotid pulsations symmetric, no bruits. Normal capillary beds refill, 1-2 seconds or less.     Neurological (>12):  MS: Awake, alert, oriented to person, place, situation, time. Normal affect. Follows all commands.    Language: Speech is clear, fluent with good repetition & comprehension (able to name objects___)    CNs: PERRLA (R = 3mm, L = 3mm). VFF. EOMI no nystagmus, no diplopia. V1-3 intact to LT/pinprick, well developed masseter muscles b/l. No facial asymmetry b/l, full eye closure strength b/l. Hearing grossly normal (rubbing fingers) b/l. Symmetric palate elevation in midline. Gag reflex deferred. Head turning & shoulder shrug intact b/l. Tongue midline, normal movements, no atrophy.    Fundoscopic: pale w/ sharp discs margins No vascular changes.      Motor: Normal muscle bulk & tone. No noticeable tremor or seizure. No pronator drift.              Deltoid	Biceps	Triceps	Wrist	Finger ABd	   R	5	5	5	5	5		5 	  L	5	5	5	5	5		5    	H-Flex	H-Ext	H-ABd	H-ADd	K-Flex	K-Ext	D-Flex	P-Flex  R	5	5	5	5	5	5	5	5 	   L	5	5	5	5	5	5	5	5	     Sensation: Intact to LT/PP/Temp/Vibration/Position b/l throughout.     Cortical: Extinction on DSS (neglect): none    Reflexes:              Biceps(C5)       BR(C6)     Triceps(C7)               Patellar(L4)    Achilles(S1)    Plantar Resp  R	2	          2	             2		        2		    2		Down   L	2	          2	             2		        2		    2		Down     Coordination: intact rapid-alt movements. No dysmetria to FTN/HTS    Gait: Normal Romberg. No postural instability. Normal stance and tandem gait.     LABORATORY:  CBC                       12.8   11.23 )-----------( 340      ( 22 Aug 2022 22:30 )             40.2     Chem     137  |  103  |  18  ----------------------------<  108<H>  5.7<H>   |  19<L>  |  0.68    Ca    9.4      22 Aug 2022 22:30    TPro  7.6  /  Alb  3.9  /  TBili  <0.2  /  DBili  x   /  AST  33<H>  /  ALT  14  /  AlkPhos  90      LFTs LIVER FUNCTIONS - ( 22 Aug 2022 22:30 )  Alb: 3.9 g/dL / Pro: 7.6 g/dL / ALK PHOS: 90 U/L / ALT: 14 U/L / AST: 33 U/L / GGT: x           Coagulopathy PT/INR - ( 22 Aug 2022 22:30 )   PT: 12.6 sec;   INR: 1.09 ratio         PTT - ( 22 Aug 2022 22:30 )  PTT:35.3 sec  Lipid Panel   A1c   Cardiac enzymes     U/A Urinalysis Basic - ( 23 Aug 2022 00:20 )    Color: Light Yellow / Appearance: Clear / S.041 / pH: x  Gluc: x / Ketone: Negative  / Bili: Negative / Urobili: <2 mg/dL   Blood: x / Protein: Trace / Nitrite: Negative   Leuk Esterase: Large / RBC: 0-2 /HPF / WBC 0-2 /HPF   Sq Epi: x / Non Sq Epi: 3-5 /HPF / Bacteria: Occasional      STUDIES & IMAGING:  Studies (EKG, EEG, EMG, etc):     Radiology (XR, CT, MR, U/S, TTE/BESSIE): HPI:  72F w/ HTN, multiple prior CVAs (pt reports asymptomatic), afib s/p ablation 3/2022 on eliquis, HARIS w/ CPAP, R knee replacement, Chiari malformation, b/l cataracts (s/p L cataract surgery) presents with intermittent vertigo for 1 month, worse in the last 4 days. Pt describes dizziness as "off balance" sensation, no room spinning, worse with position changes (sitting or standing up) and looking up. Pt reports that the dizziness has been significantly more frequent in the last 4 days causing her fall frequently. No tinnitus, hearing loss, ear pain, or ear fullness. Pt's  also notes that she has been leaning to the left and dropping things from her LUE for the last 2-3 weeks. Pt follows with outpatient neurologist Dr. Chari Sanchez for worsening memory issues for 1 year. Pt reports that she had an MRI 4-5 months ago showing chronic strokes but did not know she had strokes, and Chiari malformation. Pt reports several other symptoms including 35-40 lbs unintentional weight loss over 1.5 years, mild midcentral chest pain for 1 day, and frequency of urination, R eye floaters. No fevers, abd pain, vomiting, loc, head injury.     NIHSS:   preMRS:       REVIEW OF SYSTEMS    A 10-system ROS was performed and is negative except for those items noted above and/or in the HPI.    PAST MEDICAL & SURGICAL HISTORY:  HTN (Hypertension)      Pericarditis  around age 29&#x27;s      Muscle Cramps at Night      HARIS (obstructive sleep apnea)  noncompliant with CPAP      Patient is Jehovah&#x27;s Witness  refuse blood products      Primary osteoarthritis of right knee      Morbid obesity due to excess calories      Overactive bladder      H/O constipation      GERD (gastroesophageal reflux disease)      Atrial fibrillation  persistent in       H/O fibromyalgia      Anxiety and depression      Latex allergy      Migraines      Status Post Breast Lumpectomy  right benign      Bladder Prolapse, Acquired  s/p repair      Tubal Ligation      Status post total right knee replacement  10/25/17      S/P AV melchor ablation  3/10/22        FAMILY HISTORY:  Family history of congenital heart disease (Sibling)    Family hx of lung cancer (Father)    FH: breast cancer (Sibling)      SOCIAL HISTORY:   T/E/D:   Occupation:   Lives with:     MEDICATIONS (HOME):  Home Medications:  acetaminophen 325 mg oral tablet: 2 tab(s) orally every 6 hours, As needed, Temp greater or equal to 38C (100.4F), Mild Pain (1 - 3), Moderate Pain (4 - 6) (27 Mar 2022 16:49)  apixaban 5 mg oral tablet: 1 tab(s) orally every 12 hours (27 Mar 2022 18:20)  atorvastatin 20 mg oral tablet: 1 tab(s) orally once a day (at bedtime) (27 Mar 2022 18:23)  dilTIAZem 360 mg/24 hours oral capsule, extended release: 1 cap(s) orally once a day (27 Mar 2022 18:20)  docusate sodium 100 mg oral tablet: 1 tab(s) orally 2 times a day, As Needed (24 Mar 2022 01:08)  lidocaine 2% mucous membrane solution: 15 milliliter(s) mucous membrane every 6 hours, As Needed for ulcer symptoms  (27 Mar 2022 16:49)  losartan-hydrochlorothiazide 100 mg-12.5 mg oral tablet: 1 tab(s) orally once a day PM (24 Mar 2022 01:08)  oxybutynin 10 mg/24 hr oral tablet, extended release: 1 tab(s) orally once a day (27 Mar 2022 16:49)  senna oral tablet: 2 tab(s) orally once a day (at bedtime) (27 Mar 2022 16:49)    MEDICATIONS  (STANDING):    MEDICATIONS  (PRN):    ALLERGIES/INTOLERANCES:  Allergies  adhesives (Rash)  latex (Rash)  metoprolol (Short breath)  nickel allergy- rash (Rash)    Intolerances    VITALS & EXAMINATION:  Vital Signs Last 24 Hrs  T(C): 36.4 (23 Aug 2022 00:39), Max: 36.4 (22 Aug 2022 18:28)  T(F): 97.6 (23 Aug 2022 00:39), Max: 97.6 (22 Aug 2022 18:28)  HR: 53 (23 Aug 2022 00:39) (53 - 59)  BP: 154/61 (23 Aug 2022 00:39) (138/60 - 157/67)  BP(mean): --  RR: 13 (23 Aug 2022 00:39) (13 - 18)  SpO2: 100% (23 Aug 2022 00:39) (99% - 100%)    Parameters below as of 23 Aug 2022 00:39  Patient On (Oxygen Delivery Method): room air      General:  Constitutional: Obese Female, appears stated age, in no apparent distress including pain  Head: Normocephalic & atraumatic.  ENT: Patent ear canals, intact TM, mucus membranes moist & pink, neck supple, no lymphadenopathy.   Respiratory: Patent airway. All lung fields are clear to auscultation bilaterally.  Extremities: No cyanosis, clubbing, or edema.  Skin: No rashes, bruising, or discoloration.    Cardiovascular (>2): RRR no murmurs. Carotid pulsations symmetric, no bruits. Normal capillary beds refill, 1-2 seconds or less.     Neurological (>12):  MS: Awake, alert, oriented to person, place, situation, time. Normal affect. Follows all commands.    Language: Speech is clear, fluent with good repetition & comprehension (able to name objects___)    CNs: PERRLA (R = 3mm, L = 3mm). VFF. EOMI no nystagmus, no diplopia. V1-3 intact to LT/pinprick, well developed masseter muscles b/l. No facial asymmetry b/l, full eye closure strength b/l. Hearing grossly normal (rubbing fingers) b/l. Symmetric palate elevation in midline. Gag reflex deferred. Head turning & shoulder shrug intact b/l. Tongue midline, normal movements, no atrophy.    Fundoscopic: pale w/ sharp discs margins No vascular changes.      Motor: Normal muscle bulk & tone. No noticeable tremor or seizure. No pronator drift.              Deltoid	Biceps	Triceps	Wrist	Finger ABd	   R	5	5	5	5	5		5 	  L	5	5	5	5	5		5    	H-Flex	H-Ext	H-ABd	H-ADd	K-Flex	K-Ext	D-Flex	P-Flex  R	5	5	5	5	5	5	5	5 	   L	5	5	5	5	5	5	5	5	     Sensation: Intact to LT/PP/Temp/Vibration/Position b/l throughout.     Cortical: Extinction on DSS (neglect): none    Reflexes:              Biceps(C5)       BR(C6)     Triceps(C7)               Patellar(L4)    Achilles(S1)    Plantar Resp  R	2	          2	             2		        2		    2		Down   L	2	          2	             2		        2		    2		Down     Coordination: intact rapid-alt movements. No dysmetria to FTN/HTS    Gait: Normal Romberg. No postural instability. Normal stance and tandem gait.     LABORATORY:  CBC                       12.8   11.23 )-----------( 340      ( 22 Aug 2022 22:30 )             40.2     Chem     137  |  103  |  18  ----------------------------<  108<H>  5.7<H>   |  19<L>  |  0.68    Ca    9.4      22 Aug 2022 22:30    TPro  7.6  /  Alb  3.9  /  TBili  <0.2  /  DBili  x   /  AST  33<H>  /  ALT  14  /  AlkPhos  90      LFTs LIVER FUNCTIONS - ( 22 Aug 2022 22:30 )  Alb: 3.9 g/dL / Pro: 7.6 g/dL / ALK PHOS: 90 U/L / ALT: 14 U/L / AST: 33 U/L / GGT: x           Coagulopathy PT/INR - ( 22 Aug 2022 22:30 )   PT: 12.6 sec;   INR: 1.09 ratio         PTT - ( 22 Aug 2022 22:30 )  PTT:35.3 sec  Lipid Panel   A1c   Cardiac enzymes     U/A Urinalysis Basic - ( 23 Aug 2022 00:20 )    Color: Light Yellow / Appearance: Clear / S.041 / pH: x  Gluc: x / Ketone: Negative  / Bili: Negative / Urobili: <2 mg/dL   Blood: x / Protein: Trace / Nitrite: Negative   Leuk Esterase: Large / RBC: 0-2 /HPF / WBC 0-2 /HPF   Sq Epi: x / Non Sq Epi: 3-5 /HPF / Bacteria: Occasional      STUDIES & IMAGING:  Studies (EKG, EEG, EMG, etc):     Radiology (XR, CT, MR, U/S, TTE/BESSIE):    < from: CT Angio Neck w/ IV Cont (22 @ 23:37) >  CT BRAIN:  There is no acuteintracranial mass-effect, hemorrhage, midline shift, or   abnormal extra-axial fluid collection. No hydrocephalus. Basal cisterns   are patent.    There is no abnormal enhancement on the post contrast images.    Ventricles, sulci, and cisterns are normal in size for the patient's age.   There are periventricular and subcortical white matter hypodensities   consistent with mild microvascular changes.    Status post left lens replacement.    Paranasal sinuses and mastoid air cells are clear. Calvarium is intact.      CT ANGIOGRAPHY NECK:    Thoracic aorta and branch vessels: Patent. Atherosclerotic   calcifications.  No flow-limiting stenosis.  No evidence of dissection.    Right carotid system: Patent. Mixed cirrhotic plaque contributes to mild   (less than 50%) luminal stenosis using NASCET criteria.  No evidence of   dissection.    Left carotid system: Patent. Mixed atherosclerotic plaque contributing to   mild to moderate (approximately 50%) luminal stenosis using NASCET   criteria.  Noevidence of dissection.    Vertebral arteries: Patent.  No atherosclerosis.  No flow-limiting   stenosis.  No evidence of dissection.    Soft tissues of the neck: Unremarkable.    Visualized spine: Multilevel degenerative change.    Visualized upper chest: Few clustered nodular opacities within the left   upper lobe.    CT ANGIOGRAPHY BRAIN:    Internal carotid arteries: Patent bilaterally. Atherosclerotic   calcifications No flow limiting stenosis.    Anterior cerebral arteries: Patent bilaterally without flow limiting   stenosis.    Middle cerebral arteries: Patent bilaterally without flow limiting    stenosis.    Anterior communicating artery: Visualized.    Posterior communicating arteries: Visualized on the left.    Posterior cerebral arteries: Patent bilaterally without stenosis. Left   fetal origin.    Vertebrobasilar: Patent without stenosis. Atherosclerotic calcification   of the vertebral arteries The distal vertebral arteries are similar in   caliber.    Vascular lesions: No evidence of intracranial aneurysm within limits of   CTA technique.  Tiny aneurysms may be beyond the resolution of this   examination.    Dural venous sinuses: Grossly patent.    IMPRESSION:    Noncontrast CT Head: No acute intracranial hemorrhage, mass effect, or   evidence of acute vascular territorial infarct. If clinical symptoms   persist or worsen, more sensitive evaluation with brain MRI may be   obtained, if no contraindications exist.    CTA Neck: Mixed calcified and noncalcified atherosclerosis of the carotid   bifurcations contributing to mild right and mild to moderate left   stenoses at the origins of the internal carotid arteries, respectively.    CTA Head: No proximal large vessel occlusion.    --- End of Report ---    < end of copied text >   HPI:  72F w/ HTN, multiple prior CVAs (pt reports asymptomatic), afib s/p ablation 3/2022 on eliquis, AHRIS w/ CPAP, R knee replacement, Chiari malformation, b/l cataracts (s/p L cataract surgery) presents with intermittent vertigo for 1 month, worse in the last 4 days. Pt describes dizziness as "off balance" sensation, no room spinning, worse with position changes (sitting or standing up) and looking up. Pt reports that the dizziness has been significantly more frequent in the last 4 days causing her fall frequently. No tinnitus, hearing loss, ear pain, or ear fullness. Pt's  also notes that she has been leaning to the left and dropping things from her LUE for the last 2-3 weeks. Pt follows with outpatient neurologist Dr. Chari Sanchez for worsening memory issues for 1 year. Pt reports that she had an MRI 4-5 months ago showing chronic strokes but did not know she had strokes, and Chiari malformation. Pt reports several other symptoms including 35-40 lbs unintentional weight loss over 1.5 years, mild midcentral chest pain for 1 day, and frequency of urination, R eye floaters, SOB. No fevers, abd pain, vomiting, loc, head injury.     NIHSS: 1  preMRS: 0      REVIEW OF SYSTEMS    A 10-system ROS was performed and is negative except for those items noted above and/or in the HPI.    PAST MEDICAL & SURGICAL HISTORY:  HTN (Hypertension)      Pericarditis  around age 29&#x27;s      Muscle Cramps at Night      HARIS (obstructive sleep apnea)  noncompliant with CPAP      Patient is Jehovah&#x27;s Witness  refuse blood products      Primary osteoarthritis of right knee      Morbid obesity due to excess calories      Overactive bladder      H/O constipation      GERD (gastroesophageal reflux disease)      Atrial fibrillation  persistent in       H/O fibromyalgia      Anxiety and depression      Latex allergy      Migraines      Status Post Breast Lumpectomy  right benign      Bladder Prolapse, Acquired  s/p repair      Tubal Ligation      Status post total right knee replacement  10/25/17      S/P AV melchor ablation  3/10/22        FAMILY HISTORY:  Family history of congenital heart disease (Sibling)    Family hx of lung cancer (Father)    FH: breast cancer (Sibling)      SOCIAL HISTORY:   T/E/D:   Occupation:   Lives with:     MEDICATIONS (HOME):  Home Medications:  acetaminophen 325 mg oral tablet: 2 tab(s) orally every 6 hours, As needed, Temp greater or equal to 38C (100.4F), Mild Pain (1 - 3), Moderate Pain (4 - 6) (27 Mar 2022 16:49)  apixaban 5 mg oral tablet: 1 tab(s) orally every 12 hours (27 Mar 2022 18:20)  atorvastatin 20 mg oral tablet: 1 tab(s) orally once a day (at bedtime) (27 Mar 2022 18:23)  dilTIAZem 360 mg/24 hours oral capsule, extended release: 1 cap(s) orally once a day (27 Mar 2022 18:20)  docusate sodium 100 mg oral tablet: 1 tab(s) orally 2 times a day, As Needed (24 Mar 2022 01:08)  lidocaine 2% mucous membrane solution: 15 milliliter(s) mucous membrane every 6 hours, As Needed for ulcer symptoms  (27 Mar 2022 16:49)  losartan-hydrochlorothiazide 100 mg-12.5 mg oral tablet: 1 tab(s) orally once a day PM (24 Mar 2022 01:08)  oxybutynin 10 mg/24 hr oral tablet, extended release: 1 tab(s) orally once a day (27 Mar 2022 16:49)  senna oral tablet: 2 tab(s) orally once a day (at bedtime) (27 Mar 2022 16:49)    MEDICATIONS  (STANDING):    MEDICATIONS  (PRN):    ALLERGIES/INTOLERANCES:  Allergies  adhesives (Rash)  latex (Rash)  metoprolol (Short breath)  nickel allergy- rash (Rash)    Intolerances    VITALS & EXAMINATION:  Vital Signs Last 24 Hrs  T(C): 36.4 (23 Aug 2022 00:39), Max: 36.4 (22 Aug 2022 18:28)  T(F): 97.6 (23 Aug 2022 00:39), Max: 97.6 (22 Aug 2022 18:28)  HR: 53 (23 Aug 2022 00:39) (53 - 59)  BP: 154/61 (23 Aug 2022 00:39) (138/60 - 157/67)  BP(mean): --  RR: 13 (23 Aug 2022 00:39) (13 - 18)  SpO2: 100% (23 Aug 2022 00:39) (99% - 100%)    Parameters below as of 23 Aug 2022 00:39  Patient On (Oxygen Delivery Method): room air      General:  Constitutional: Obese Female, appears stated age, in no apparent distress including pain  Head: Normocephalic & atraumatic.  Respiratory: No increased work of breathing  Extremities: No cyanosis, clubbing, or edema.  Skin: No rashes, bruising, or discoloration.    Neurological (>12):  MS: Awake, alert, oriented to person, place, situation, time. Normal affect. Follows all commands. Has some difficulty providing hx with  correcting time line of symptoms at bedside.    Language: Speech is clear, fluent with good repetition & comprehension (able to name objects mask, thumb)    CNs: PERRL (R = 3mm, L = 4mm and surgical). VFF. EOMI w/ fatigable few beats of nystagmus, no diplopia. V1-3 intact to LT/pinprick, well developed masseter muscles b/l. No facial asymmetry b/l, full eye closure strength b/l. Hearing grossly normal (rubbing fingers) b/l. Symmetric palate elevation in midline. Gag reflex deferred. Head turning & shoulder shrug intact b/l. Tongue midline, normal movements, no atrophy.    HINTS (Head Impulse, Nystagmus, Test of Skew):   1. Head Impulse: no corrective saccade  2. Nystagmus: few beats of nystagmus b/l  3. Test of Skew: Absent skew deviation     Motor: Normal muscle bulk & tone. No noticeable tremor or seizure. +R pronator drift.              Deltoid	Biceps	Triceps	Wrist	Finger ABd	   R	4+	4+	4+		4+		4+ 	  L	4+	5	5		5		5       LUE limited by chronic L shoulder pain    	H-Flex	K-Flex	K-Ext	D-Flex	P-Flex  R	4+	5	5	5	5		   L	5	5	4+	5	5	     Sensation: Intact to LT/PP/Temp/Vibration on LUE and LLE    Cortical: Extinction on DSS (neglect): none    Reflexes:              Biceps(C5)       BR(C6)     Triceps(C7)               Patellar(L4)    Achilles(S1)    Plantar Resp  R	2	          2	             2		        0		    1		Down             R knee replacement  L	2	          2	             2		        3		    1		Down     Coordination: Finger tapping and hand open/closing slow b/l (L>R), possible dysmetria on L to FTN    Gait: +retropulsion. Normal stance and gait although cautious. Dizziness reported when sitting up     LABORATORY:  CBC                       12.8   11.23 )-----------( 340      ( 22 Aug 2022 22:30 )             40.2     Chem     137  |  103  |  18  ----------------------------<  108<H>  5.7<H>   |  19<L>  |  0.68    Ca    9.4      22 Aug 2022 22:30    TPro  7.6  /  Alb  3.9  /  TBili  <0.2  /  DBili  x   /  AST  33<H>  /  ALT  14  /  AlkPhos  90      LFTs LIVER FUNCTIONS - ( 22 Aug 2022 22:30 )  Alb: 3.9 g/dL / Pro: 7.6 g/dL / ALK PHOS: 90 U/L / ALT: 14 U/L / AST: 33 U/L / GGT: x           Coagulopathy PT/INR - ( 22 Aug 2022 22:30 )   PT: 12.6 sec;   INR: 1.09 ratio         PTT - ( 22 Aug 2022 22:30 )  PTT:35.3 sec  Lipid Panel   A1c   Cardiac enzymes     U/A Urinalysis Basic - ( 23 Aug 2022 00:20 )    Color: Light Yellow / Appearance: Clear / S.041 / pH: x  Gluc: x / Ketone: Negative  / Bili: Negative / Urobili: <2 mg/dL   Blood: x / Protein: Trace / Nitrite: Negative   Leuk Esterase: Large / RBC: 0-2 /HPF / WBC 0-2 /HPF   Sq Epi: x / Non Sq Epi: 3-5 /HPF / Bacteria: Occasional      STUDIES & IMAGING:  Studies (EKG, EEG, EMG, etc):     Radiology (XR, CT, MR, U/S, TTE/BESSIE):    < from: CT Angio Neck w/ IV Cont (22 @ 23:37) >  CT BRAIN:  There is no acuteintracranial mass-effect, hemorrhage, midline shift, or   abnormal extra-axial fluid collection. No hydrocephalus. Basal cisterns   are patent.    There is no abnormal enhancement on the post contrast images.    Ventricles, sulci, and cisterns are normal in size for the patient's age.   There are periventricular and subcortical white matter hypodensities   consistent with mild microvascular changes.    Status post left lens replacement.    Paranasal sinuses and mastoid air cells are clear. Calvarium is intact.      CT ANGIOGRAPHY NECK:    Thoracic aorta and branch vessels: Patent. Atherosclerotic   calcifications.  No flow-limiting stenosis.  No evidence of dissection.    Right carotid system: Patent. Mixed cirrhotic plaque contributes to mild   (less than 50%) luminal stenosis using NASCET criteria.  No evidence of   dissection.    Left carotid system: Patent. Mixed atherosclerotic plaque contributing to   mild to moderate (approximately 50%) luminal stenosis using NASCET   criteria.  Noevidence of dissection.    Vertebral arteries: Patent.  No atherosclerosis.  No flow-limiting   stenosis.  No evidence of dissection.    Soft tissues of the neck: Unremarkable.    Visualized spine: Multilevel degenerative change.    Visualized upper chest: Few clustered nodular opacities within the left   upper lobe.    CT ANGIOGRAPHY BRAIN:    Internal carotid arteries: Patent bilaterally. Atherosclerotic   calcifications No flow limiting stenosis.    Anterior cerebral arteries: Patent bilaterally without flow limiting   stenosis.    Middle cerebral arteries: Patent bilaterally without flow limiting    stenosis.    Anterior communicating artery: Visualized.    Posterior communicating arteries: Visualized on the left.    Posterior cerebral arteries: Patent bilaterally without stenosis. Left   fetal origin.    Vertebrobasilar: Patent without stenosis. Atherosclerotic calcification   of the vertebral arteries The distal vertebral arteries are similar in   caliber.    Vascular lesions: No evidence of intracranial aneurysm within limits of   CTA technique.  Tiny aneurysms may be beyond the resolution of this   examination.    Dural venous sinuses: Grossly patent.    IMPRESSION:    Noncontrast CT Head: No acute intracranial hemorrhage, mass effect, or   evidence of acute vascular territorial infarct. If clinical symptoms   persist or worsen, more sensitive evaluation with brain MRI may be   obtained, if no contraindications exist.    CTA Neck: Mixed calcified and noncalcified atherosclerosis of the carotid   bifurcations contributing to mild right and mild to moderate left   stenoses at the origins of the internal carotid arteries, respectively.    CTA Head: No proximal large vessel occlusion.    --- End of Report ---    < end of copied text >   HPI:  72F RH w/ HTN, multiple prior CVAs (pt reports asymptomatic), afib s/p ablation 3/2022 on eliquis, HARIS w/ CPAP, R knee replacement, Chiari malformation, b/l cataracts (s/p L cataract surgery) presents with intermittent vertigo for 1 month, worse in the last 4 days. Pt describes dizziness as "off balance" sensation, no room spinning, worse with position changes (sitting or standing up) and looking up. Pt reports that the dizziness has been significantly more frequent in the last 4 days causing her fall frequently. No tinnitus, hearing loss, ear pain, or ear fullness. Pt's  also notes that she has been leaning to the left and dropping things from her LUE for the last 2-3 weeks. Pt follows with outpatient neurologist Dr. Chari Sanchez for worsening memory issues for 1 year. Pt reports that she had an MRI 4-5 months ago showing chronic strokes but did not know she had strokes, and Chiari malformation. Pt reports several other symptoms including 35-40 lbs unintentional weight loss over 1.5 years, mild midcentral chest pain for 1 day, and frequency of urination, R eye floaters, SOB. No fevers, abd pain, vomiting, loc, head injury.     NIHSS: 1  preMRS: 0      REVIEW OF SYSTEMS    A 10-system ROS was performed and is negative except for those items noted above and/or in the HPI.    PAST MEDICAL & SURGICAL HISTORY:  HTN (Hypertension)      Pericarditis  around age 29&#x27;s      Muscle Cramps at Night      HARIS (obstructive sleep apnea)  noncompliant with CPAP      Patient is [employer]&#x[employer];s [employer]  refuse blood products      Primary osteoarthritis of right knee      Morbid obesity due to excess calories      Overactive bladder      H/O constipation      GERD (gastroesophageal reflux disease)      Atrial fibrillation  persistent in       H/O fibromyalgia      Anxiety and depression      Latex allergy      Migraines      Status Post Breast Lumpectomy  right benign      Bladder Prolapse, Acquired  s/p repair      Tubal Ligation      Status post total right knee replacement  10/25/17      S/P AV melchor ablation  3/10/22        FAMILY HISTORY:  Family history of congenital heart disease (Sibling)    Family hx of lung cancer (Father)    FH: breast cancer (Sibling)      SOCIAL HISTORY:   T/E/D:   Occupation:   Lives with:     MEDICATIONS (HOME):  Home Medications:  acetaminophen 325 mg oral tablet: 2 tab(s) orally every 6 hours, As needed, Temp greater or equal to 38C (100.4F), Mild Pain (1 - 3), Moderate Pain (4 - 6) (27 Mar 2022 16:49)  apixaban 5 mg oral tablet: 1 tab(s) orally every 12 hours (27 Mar 2022 18:20)  atorvastatin 20 mg oral tablet: 1 tab(s) orally once a day (at bedtime) (27 Mar 2022 18:23)  dilTIAZem 360 mg/24 hours oral capsule, extended release: 1 cap(s) orally once a day (27 Mar 2022 18:20)  docusate sodium 100 mg oral tablet: 1 tab(s) orally 2 times a day, As Needed (24 Mar 2022 01:08)  lidocaine 2% mucous membrane solution: 15 milliliter(s) mucous membrane every 6 hours, As Needed for ulcer symptoms  (27 Mar 2022 16:49)  losartan-hydrochlorothiazide 100 mg-12.5 mg oral tablet: 1 tab(s) orally once a day PM (24 Mar 2022 01:08)  oxybutynin 10 mg/24 hr oral tablet, extended release: 1 tab(s) orally once a day (27 Mar 2022 16:49)  senna oral tablet: 2 tab(s) orally once a day (at bedtime) (27 Mar 2022 16:49)    MEDICATIONS  (STANDING):    MEDICATIONS  (PRN):    ALLERGIES/INTOLERANCES:  Allergies  adhesives (Rash)  latex (Rash)  metoprolol (Short breath)  nickel allergy- rash (Rash)    Intolerances    VITALS & EXAMINATION:  Vital Signs Last 24 Hrs  T(C): 36.4 (23 Aug 2022 00:39), Max: 36.4 (22 Aug 2022 18:28)  T(F): 97.6 (23 Aug 2022 00:39), Max: 97.6 (22 Aug 2022 18:28)  HR: 53 (23 Aug 2022 00:39) (53 - 59)  BP: 154/61 (23 Aug 2022 00:39) (138/60 - 157/67)  BP(mean): --  RR: 13 (23 Aug 2022 00:39) (13 - 18)  SpO2: 100% (23 Aug 2022 00:39) (99% - 100%)    Parameters below as of 23 Aug 2022 00:39  Patient On (Oxygen Delivery Method): room air      General:  Constitutional: Obese Female, appears stated age, in no apparent distress including pain  Head: Normocephalic & atraumatic.  Respiratory: No increased work of breathing  Extremities: No cyanosis, clubbing, or edema.  Skin: No rashes, bruising, or discoloration.    Neurological (>12):  MS: Awake, alert, oriented to person, place, situation, time. Normal affect. Follows all commands. Has some difficulty providing hx with  correcting time line of symptoms at bedside.    Language: Speech is clear, fluent with good repetition & comprehension (able to name objects mask, thumb)    CNs: PERRL (R = 3mm, L = 4mm and surgical). VFF. EOMI w/ fatigable few beats of nystagmus, no diplopia. V1-3 intact to LT/pinprick, well developed masseter muscles b/l. No facial asymmetry b/l, full eye closure strength b/l. Hearing grossly normal (rubbing fingers) b/l. Symmetric palate elevation in midline. Gag reflex deferred. Head turning & shoulder shrug intact b/l. Tongue midline, normal movements, no atrophy.    HINTS (Head Impulse, Nystagmus, Test of Skew):   1. Head Impulse: no corrective saccade  2. Nystagmus: few beats of nystagmus b/l  3. Test of Skew: Absent skew deviation     Motor: Normal muscle bulk & tone. No noticeable tremor or seizure. +R pronator drift.              Deltoid	Biceps	Triceps	Wrist	Finger ABd	   R	4+	4+	4+		4+		4+ 	  L	4+	5	5		5		5       LUE limited by chronic L shoulder pain    	H-Flex	K-Flex	K-Ext	D-Flex	P-Flex  R	4+	5	5	5	5		   L	5	5	4+	5	5	     Sensation: Intact to LT/PP/Temp/Vibration on LUE and LLE    Cortical: Extinction on DSS (neglect): none    Reflexes:              Biceps(C5)       BR(C6)     Triceps(C7)               Patellar(L4)    Achilles(S1)    Plantar Resp  R	2	          2	             2		        0		    1		Down             R knee replacement  L	2	          2	             2		        3		    1		Down     Coordination: Finger tapping and hand open/closing slow b/l (L>R), possible dysmetria on L to FTN    Gait: +retropulsion. Normal stance and gait although cautious. Dizziness reported when sitting up     LABORATORY:  CBC                       12.8   11.23 )-----------( 340      ( 22 Aug 2022 22:30 )             40.2     Chem     137  |  103  |  18  ----------------------------<  108<H>  5.7<H>   |  19<L>  |  0.68    Ca    9.4      22 Aug 2022 22:30    TPro  7.6  /  Alb  3.9  /  TBili  <0.2  /  DBili  x   /  AST  33<H>  /  ALT  14  /  AlkPhos  90      LFTs LIVER FUNCTIONS - ( 22 Aug 2022 22:30 )  Alb: 3.9 g/dL / Pro: 7.6 g/dL / ALK PHOS: 90 U/L / ALT: 14 U/L / AST: 33 U/L / GGT: x           Coagulopathy PT/INR - ( 22 Aug 2022 22:30 )   PT: 12.6 sec;   INR: 1.09 ratio         PTT - ( 22 Aug 2022 22:30 )  PTT:35.3 sec  Lipid Panel   A1c   Cardiac enzymes     U/A Urinalysis Basic - ( 23 Aug 2022 00:20 )    Color: Light Yellow / Appearance: Clear / S.041 / pH: x  Gluc: x / Ketone: Negative  / Bili: Negative / Urobili: <2 mg/dL   Blood: x / Protein: Trace / Nitrite: Negative   Leuk Esterase: Large / RBC: 0-2 /HPF / WBC 0-2 /HPF   Sq Epi: x / Non Sq Epi: 3-5 /HPF / Bacteria: Occasional      STUDIES & IMAGING:  Studies (EKG, EEG, EMG, etc):     Radiology (XR, CT, MR, U/S, TTE/BESSIE):    < from: CT Angio Neck w/ IV Cont (22 @ 23:37) >  CT BRAIN:  There is no acuteintracranial mass-effect, hemorrhage, midline shift, or   abnormal extra-axial fluid collection. No hydrocephalus. Basal cisterns   are patent.    There is no abnormal enhancement on the post contrast images.    Ventricles, sulci, and cisterns are normal in size for the patient's age.   There are periventricular and subcortical white matter hypodensities   consistent with mild microvascular changes.    Status post left lens replacement.    Paranasal sinuses and mastoid air cells are clear. Calvarium is intact.      CT ANGIOGRAPHY NECK:    Thoracic aorta and branch vessels: Patent. Atherosclerotic   calcifications.  No flow-limiting stenosis.  No evidence of dissection.    Right carotid system: Patent. Mixed cirrhotic plaque contributes to mild   (less than 50%) luminal stenosis using NASCET criteria.  No evidence of   dissection.    Left carotid system: Patent. Mixed atherosclerotic plaque contributing to   mild to moderate (approximately 50%) luminal stenosis using NASCET   criteria.  Noevidence of dissection.    Vertebral arteries: Patent.  No atherosclerosis.  No flow-limiting   stenosis.  No evidence of dissection.    Soft tissues of the neck: Unremarkable.    Visualized spine: Multilevel degenerative change.    Visualized upper chest: Few clustered nodular opacities within the left   upper lobe.    CT ANGIOGRAPHY BRAIN:    Internal carotid arteries: Patent bilaterally. Atherosclerotic   calcifications No flow limiting stenosis.    Anterior cerebral arteries: Patent bilaterally without flow limiting   stenosis.    Middle cerebral arteries: Patent bilaterally without flow limiting    stenosis.    Anterior communicating artery: Visualized.    Posterior communicating arteries: Visualized on the left.    Posterior cerebral arteries: Patent bilaterally without stenosis. Left   fetal origin.    Vertebrobasilar: Patent without stenosis. Atherosclerotic calcification   of the vertebral arteries The distal vertebral arteries are similar in   caliber.    Vascular lesions: No evidence of intracranial aneurysm within limits of   CTA technique.  Tiny aneurysms may be beyond the resolution of this   examination.    Dural venous sinuses: Grossly patent.    IMPRESSION:    Noncontrast CT Head: No acute intracranial hemorrhage, mass effect, or   evidence of acute vascular territorial infarct. If clinical symptoms   persist or worsen, more sensitive evaluation with brain MRI may be   obtained, if no contraindications exist.    CTA Neck: Mixed calcified and noncalcified atherosclerosis of the carotid   bifurcations contributing to mild right and mild to moderate left   stenoses at the origins of the internal carotid arteries, respectively.    CTA Head: No proximal large vessel occlusion.    --- End of Report ---    < end of copied text >

## 2022-08-23 NOTE — H&P ADULT - PROBLEM SELECTOR PLAN 2
Patient with dizziness and possible vertigo-   - House neurology consulted and following and recommending:  - BP measurements in both arms and orthostatic VS  - Meclizine 12.5mg BID PRN (can increase to 50mg Q8)  - If refractory to meclizine, can try Clonazepam 0.5mg now then Q8H PRN (for 2 to 3 days)  - refer for Vestibular Rehab on discharge  - Epley maneuver print out  - Fall precautions   - PT/OT consult

## 2022-08-23 NOTE — H&P ADULT - NS ATTEND AMEND GEN_ALL_CORE FT
71 y/o female [place] with PMH of HTN, multiple prior CVAs (pt reports asymptomatic), Atrial fibrillation diagnosed on 7/2021 s/p ablation 3/2022 on Eliquis, HARIS w/ CPAP, right knee replacement 2017, presents to ED c/o dizziness and difficulty walking. Patient admitted for management of vertigo with possible stroke.       #vertigo/dizziness: CVA vs peripheral vertigo vs vestibular dysfx. CTH and CT neck results reviewed. Strength and sensation grossly intact. Neuro following, f/u recs. Will check A1c, tsh. PT/OT eval.    #afib: sinus sharon on EKG. Per chart review in march 2022, patient's HR has been in the 50-60s hence less likely the cause of her dizziness. c/w home eliquis and cardizem. c/w eliquis    #atypical chest pain: trop 7, EKG no acute ischemic changes. L chest with radiation to shoulder and arm, reproducible on palpation, constant for 1 day, nonexertional. No SOB. BP stable. Low suspicion for ACS at this time. Monitor on tele. hx of esophageal ulcer in march 2022, will trial carafate and c/w home PPI    #HTN: c/w home meds with parameters    #UTI: increased urinary frequency for the past few days, UA noted for occasional bacteria, leukocytesterase. Borderline elevated leukocytosis. Will rx with 3 days of CTX. F/u urine cx    Discussed with CK Hathaway.

## 2022-08-23 NOTE — H&P ADULT - PROBLEM SELECTOR PLAN 5
Patient with history of Gastroesophageal reflux disease  - Continue with PPI from home   - Avoid spicy and aggravating foods. - HTN -  Stable  - DASH diet  - c/w home meds w/ holding parameters  - monitor BP & titrate BP meds PRN.

## 2022-08-23 NOTE — CONSULT NOTE ADULT - ATTENDING COMMENTS
Exam:  Head impulse - no corective saccade  No nystagmus  No skew  Subtle decreased movement of left face compared to the right.   Decreased PP on the left side - F/A/L  No ataxia - FNF, SANJEEV, HKS  No UE drift - arm in partial pronation due to shoulder disease, B.   Slower RSM on the left.   B Delt weakness due to shoulder disease - otherwise 5/5 throughout.   No ataxia - FNF, SANJEEV, HKS.   Reflexes 2 throughout except absent ankle reflexes.         A/P  Ms. Connolly is a 71 yo woman with several issues.   Acute issue is most c/w peripheral vertigo. I agree with MRI brain/IAC w/wo to exclude central lesion.  Chiari malformation can present with these symptoms. Need sagittal images of lower brainstem and upper cervical spine.   No clinical stroke but infarcts seen on MRI brain in the past.  Mild B ICA stenosis.   She is already on A/C and statin for stroke risk reduction; continue maximal medical management.   Other chronic issues to f/u with her neurologist - cognitive impairment, Vitamin B12 level, TSH.    Thank you    Please call us for any further question.

## 2022-08-23 NOTE — H&P ADULT - PROBLEM SELECTOR PLAN 3
Patient with history of Afib on Eliquis- S/P Ablation on 3/2022  As per neuro, ok to continue with Eliquis for Afib for secondary stroke prevention.

## 2022-08-23 NOTE — H&P ADULT - PROBLEM SELECTOR PLAN 7
DVT prophylaxis - Patient on Eliquis 5 mg BID for Afib.  - Patient urinalysis with large leukocytes with frequency- urine culture was sent from ED- Will treat with 3 day course of Ceftriaxone.   - Patient with history of HARIS on CPAP - will continue at hs.

## 2022-08-23 NOTE — ED ADULT NURSE REASSESSMENT NOTE - NS ED NURSE REASSESS COMMENT FT1
received report from break rn. Pt is resting comfortably, offers no complaints. VSS. RR even and unlabored. Endorsing mild dizziness. Awaiting further orders from provider.
Break coverage RN. Patient A&Ox4, respirations even and unlabored. Patient comfortable, denies any complaints at this time. Vitals stable. Patient and family aware of plan for admission to hospital. Stretcher in lowest position, wheels locked, appropriate side rails in place, call bell in reach.
pt is alert and oriented. pt denies complaints at this time. pt is sinus sharon on cardiac monitor. RR are even and unlabored. Will continue to monitor. Pending CT results
Received report from night Rn. Pt is A&Ox4, ambulatory with assist. Afib on monitor on eliquis, complains of dizziness. States multiple falls, no known LOC. No bruising/ecchymosis noted on body or head. States urinary frequency and mild chest pain, denies currently. Denies CP, SOB, N+V, diarrhea, headache, dizziness, pain. VSS. RR even and unlabored. Awaiting stroke scale, dysphagia, labs, further orders

## 2022-08-23 NOTE — H&P ADULT - ASSESSMENT
71 y/o female [employer] with PMH of HTN, multiple prior CVAs (pt reports asymptomatic), Atrial fibrillation diagnosed on 7/2021 s/p ablation 3/2022 on Eliquis, HARIS w/ CPAP, right knee replacement 2017, presents to ED c/o dizziness and difficulty walking. Patient admitted for management of vertigo with possible stroke.     In ED, Noncontrast CT Head: No acute intracranial hemorrhage, mass effect, or evidence of acute vascular territorial infarct.   CTA Neck: Mixed calcified and noncalcified atherosclerosis of the carotid bifurcations contributing to mild right and mild to moderate left stenoses at the origins of the internal carotid arteries, respectively.  CTA Head: No proximal large vessel occlusion.

## 2022-08-23 NOTE — H&P ADULT - PROBLEM SELECTOR PLAN 1
Patient with dizziness and unsteady gait r/o CVA-   In ED, Noncontrast CT Head: No acute intracranial hemorrhage, mass effect, or evidence of acute vascular territorial infarct.   CTA Neck: Mixed calcified and noncalcified atherosclerosis of the carotid bifurcations contributing to mild right and mild to moderate left stenoses at the origins of the internal carotid arteries, respectively.  CTA Head: No proximal large vessel occlusion.  - House neurology consulted and following  - MRI head w and w/o contrast  - Monitor on telemetry, serial EKGs  - Orthostatic BP  - TTE with bubble study ordered   - Continue with Eliquis 5 mg PO BID and Atorvastatin 40 mg PO QHS  - Elevate head of the bed to 30 degree  - Neuro checks Q4-6 Hrs  - RN dysphagia screen  - Speech and swallow if needed  - Seizure, fall and aspiration precautions  - HgbA1C, TSH, lipid profile, CBC, CMP in AM  - PT/OT consult

## 2022-08-23 NOTE — H&P ADULT - PROBLEM SELECTOR PLAN 6
DVT prophylaxis - Patient on Eliquis 5 mg BID for Afib.  - Patient urinalysis with large leukocytes but no symptoms of dysuria or frequency- urine culture was sent from ED- will followup results  - Patient with history of HARIS but non compliant with CPAP Patient with history of Gastroesophageal reflux disease  - Continue with PPI from home with trial of Carafate 1gm Q6hrs  - Avoid spicy and aggravating foods.

## 2022-08-24 DIAGNOSIS — N39.0 URINARY TRACT INFECTION, SITE NOT SPECIFIED: ICD-10-CM

## 2022-08-24 DIAGNOSIS — G47.33 OBSTRUCTIVE SLEEP APNEA (ADULT) (PEDIATRIC): ICD-10-CM

## 2022-08-24 LAB
ALBUMIN SERPL ELPH-MCNC: 3.9 G/DL — SIGNIFICANT CHANGE UP (ref 3.3–5)
ALP SERPL-CCNC: 87 U/L — SIGNIFICANT CHANGE UP (ref 40–120)
ALT FLD-CCNC: 13 U/L — SIGNIFICANT CHANGE UP (ref 4–33)
ANION GAP SERPL CALC-SCNC: 13 MMOL/L — SIGNIFICANT CHANGE UP (ref 7–14)
AST SERPL-CCNC: 21 U/L — SIGNIFICANT CHANGE UP (ref 4–32)
BILIRUB SERPL-MCNC: 0.4 MG/DL — SIGNIFICANT CHANGE UP (ref 0.2–1.2)
BUN SERPL-MCNC: 12 MG/DL — SIGNIFICANT CHANGE UP (ref 7–23)
CALCIUM SERPL-MCNC: 9.7 MG/DL — SIGNIFICANT CHANGE UP (ref 8.4–10.5)
CHLORIDE SERPL-SCNC: 101 MMOL/L — SIGNIFICANT CHANGE UP (ref 98–107)
CO2 SERPL-SCNC: 22 MMOL/L — SIGNIFICANT CHANGE UP (ref 22–31)
CREAT SERPL-MCNC: 0.68 MG/DL — SIGNIFICANT CHANGE UP (ref 0.5–1.3)
EGFR: 92 ML/MIN/1.73M2 — SIGNIFICANT CHANGE UP
GLUCOSE SERPL-MCNC: 102 MG/DL — HIGH (ref 70–99)
HCT VFR BLD CALC: 39.3 % — SIGNIFICANT CHANGE UP (ref 34.5–45)
HGB BLD-MCNC: 13 G/DL — SIGNIFICANT CHANGE UP (ref 11.5–15.5)
MAGNESIUM SERPL-MCNC: 1.9 MG/DL — SIGNIFICANT CHANGE UP (ref 1.6–2.6)
MCHC RBC-ENTMCNC: 28.8 PG — SIGNIFICANT CHANGE UP (ref 27–34)
MCHC RBC-ENTMCNC: 33.1 GM/DL — SIGNIFICANT CHANGE UP (ref 32–36)
MCV RBC AUTO: 86.9 FL — SIGNIFICANT CHANGE UP (ref 80–100)
NRBC # BLD: 0 /100 WBCS — SIGNIFICANT CHANGE UP (ref 0–0)
NRBC # FLD: 0 K/UL — SIGNIFICANT CHANGE UP (ref 0–0)
PHOSPHATE SERPL-MCNC: 4.1 MG/DL — SIGNIFICANT CHANGE UP (ref 2.5–4.5)
PLATELET # BLD AUTO: 327 K/UL — SIGNIFICANT CHANGE UP (ref 150–400)
POTASSIUM SERPL-MCNC: 4.3 MMOL/L — SIGNIFICANT CHANGE UP (ref 3.5–5.3)
POTASSIUM SERPL-SCNC: 4.3 MMOL/L — SIGNIFICANT CHANGE UP (ref 3.5–5.3)
PROT SERPL-MCNC: 7.6 G/DL — SIGNIFICANT CHANGE UP (ref 6–8.3)
RBC # BLD: 4.52 M/UL — SIGNIFICANT CHANGE UP (ref 3.8–5.2)
RBC # FLD: 13.3 % — SIGNIFICANT CHANGE UP (ref 10.3–14.5)
SODIUM SERPL-SCNC: 136 MMOL/L — SIGNIFICANT CHANGE UP (ref 135–145)
WBC # BLD: 8.86 K/UL — SIGNIFICANT CHANGE UP (ref 3.8–10.5)
WBC # FLD AUTO: 8.86 K/UL — SIGNIFICANT CHANGE UP (ref 3.8–10.5)

## 2022-08-24 PROCEDURE — 99233 SBSQ HOSP IP/OBS HIGH 50: CPT

## 2022-08-24 PROCEDURE — 93306 TTE W/DOPPLER COMPLETE: CPT | Mod: 26

## 2022-08-24 RX ORDER — ACETAMINOPHEN 500 MG
650 TABLET ORAL EVERY 6 HOURS
Refills: 0 | Status: DISCONTINUED | OUTPATIENT
Start: 2022-08-24 | End: 2022-08-30

## 2022-08-24 RX ADMIN — Medication 1 GRAM(S): at 23:10

## 2022-08-24 RX ADMIN — LOSARTAN POTASSIUM 100 MILLIGRAM(S): 100 TABLET, FILM COATED ORAL at 05:32

## 2022-08-24 RX ADMIN — Medication 12.5 MILLIGRAM(S): at 15:20

## 2022-08-24 RX ADMIN — PANTOPRAZOLE SODIUM 40 MILLIGRAM(S): 20 TABLET, DELAYED RELEASE ORAL at 07:26

## 2022-08-24 RX ADMIN — Medication 1 GRAM(S): at 12:04

## 2022-08-24 RX ADMIN — Medication 1 TABLET(S): at 12:04

## 2022-08-24 RX ADMIN — Medication 15 MILLIGRAM(S): at 12:04

## 2022-08-24 RX ADMIN — CEFTRIAXONE 100 MILLIGRAM(S): 500 INJECTION, POWDER, FOR SOLUTION INTRAMUSCULAR; INTRAVENOUS at 12:03

## 2022-08-24 RX ADMIN — Medication 1 GRAM(S): at 05:31

## 2022-08-24 RX ADMIN — Medication 300 MILLIGRAM(S): at 06:02

## 2022-08-24 RX ADMIN — ATORVASTATIN CALCIUM 40 MILLIGRAM(S): 80 TABLET, FILM COATED ORAL at 00:25

## 2022-08-24 RX ADMIN — Medication 1 GRAM(S): at 18:12

## 2022-08-24 RX ADMIN — ATORVASTATIN CALCIUM 40 MILLIGRAM(S): 80 TABLET, FILM COATED ORAL at 21:47

## 2022-08-24 RX ADMIN — APIXABAN 5 MILLIGRAM(S): 2.5 TABLET, FILM COATED ORAL at 18:12

## 2022-08-24 RX ADMIN — APIXABAN 5 MILLIGRAM(S): 2.5 TABLET, FILM COATED ORAL at 05:31

## 2022-08-24 RX ADMIN — SENNA PLUS 2 TABLET(S): 8.6 TABLET ORAL at 21:47

## 2022-08-24 RX ADMIN — Medication 650 MILLIGRAM(S): at 23:10

## 2022-08-24 RX ADMIN — Medication 12.5 MILLIGRAM(S): at 05:31

## 2022-08-24 NOTE — PHYSICAL THERAPY INITIAL EVALUATION ADULT - GAIT DEVIATIONS NOTED, PT EVAL
decreased trent/increased time in double stance/decreased velocity of limb motion/decreased step length/decreased weight-shifting ability

## 2022-08-24 NOTE — OCCUPATIONAL THERAPY INITIAL EVALUATION ADULT - DIAGNOSIS, OT EVAL
s/p dizziness, s/p difficulty walking, s/p r/o vertigo vs stroke; decreased functional mobility, decreased ADL performance

## 2022-08-24 NOTE — OCCUPATIONAL THERAPY INITIAL EVALUATION ADULT - PERTINENT HX OF CURRENT PROBLEM, REHAB EVAL
72 year old female with history of HTN, multiple prior CVAs (pt reports asymptomatic), Atrial fibrillation diagnosed on 7/2021 s/p ablation 3/2022 on Eliquis, HARIS w/ CPAP, right knee replacement 2017, presents to ED with c/o dizziness and difficulty walking. Patient admitted for management of vertigo with possible stroke. Found to have acute UTI.

## 2022-08-24 NOTE — OCCUPATIONAL THERAPY INITIAL EVALUATION ADULT - LIVES WITH, PROFILE
Lives on the 1st floor of a private home with her , son lives on 2nd floor. Has steps to manage./children/spouse

## 2022-08-24 NOTE — PROGRESS NOTE ADULT - PROBLEM SELECTOR PLAN 4
Patient with atypical chest pain, reproducible with movement of her left shoulder, likely MSK pain   Serial EKGs - no acute ischemic changes and cardiac enzymes - troponin 7  TTE with preserved EF and without evidence of WMA   Monitor on telemetry  [ ] left shoulder XR

## 2022-08-24 NOTE — OCCUPATIONAL THERAPY INITIAL EVALUATION ADULT - MD ORDER
Occupational therapy to evaluate and treat. Occupational therapy to evaluate and treat.  Out of bed with assistance.

## 2022-08-24 NOTE — PROGRESS NOTE ADULT - SUBJECTIVE AND OBJECTIVE BOX
Patient is a 72y old  Female who presents with a chief complaint of Dizziness and unsteady gait (23 Aug 2022 08:15)    rApan Grewal MD   [facility]   Pager [phone]  Reachable on [employer] Teams     SUBJECTIVE / OVERNIGHT EVENTS:  Patient seen and examined this morning. She states that she continues to have the spinning sensation, not much has changed.   She denies any headaches or vision changes.       MEDICATIONS  (STANDING):  apixaban 5 milliGRAM(s) Oral every 12 hours  atorvastatin 40 milliGRAM(s) Oral at bedtime  cefTRIAXone   IVPB 1000 milliGRAM(s) IV Intermittent every 24 hours  diltiazem    milliGRAM(s) Oral daily  hydrochlorothiazide 12.5 milliGRAM(s) Oral daily  losartan 100 milliGRAM(s) Oral daily  multivitamin 1 Tablet(s) Oral daily  oxybutynin 15 milliGRAM(s) Oral daily  pantoprazole    Tablet 40 milliGRAM(s) Oral before breakfast  senna 2 Tablet(s) Oral at bedtime  sucralfate 1 Gram(s) Oral every 6 hours    MEDICATIONS  (PRN):  meclizine 12.5 milliGRAM(s) Oral two times a day PRN Dizziness      Vital Signs Last 24 Hrs  T(C): 36.8 (24 Aug 2022 09:18), Max: 36.9 (24 Aug 2022 05:26)  T(F): 98.2 (24 Aug 2022 09:18), Max: 98.5 (24 Aug 2022 05:26)  HR: 56 (24 Aug 2022 13:42) (56 - 88)  BP: 111/71 (24 Aug 2022 13:42) (111/71 - 170/70)  BP(mean): --  RR: 18 (24 Aug 2022 09:18) (17 - 19)  SpO2: 96% (24 Aug 2022 10:04) (96% - 100%)  CAPILLARY BLOOD GLUCOSE        I&O's Summary      General: NAD, awake and alert  Eyes: no nystagmus, conjunctiva clear, nonicteric sclera  HENMT: NCAT, MMM  Neck: Supple, trachea midline   Respiratory: No respiratory distress, CTABL, No rales, rhonchi, wheezing.  Cardiovascular: S1,S2; Regular rate and rhythm; No m/g/r. 2+ peripheral pulses  Gastrointestinal: Soft, Nontender, Nondistended; +BS.   Extremities: No c/c/e; warm to touch  Neurological: CN II-XII in tact. 5/5 strength in Bl biceps, tricpeps, handgrip, knee flexion, knee extension plantal flexion. Sensation to LT grossly in tact in BLEs.   Skin: No rashes, No erythema   Psych: AAOx3; appropriate mood and affect    LABS:                        13.0   8.86  )-----------( 327      ( 24 Aug 2022 05:12 )             39.3         136  |  101  |  12  ----------------------------<  102<H>  4.3   |  22  |  0.68    Ca    9.7      24 Aug 2022 05:12  Phos  4.1       Mg     1.90         TPro  7.6  /  Alb  3.9  /  TBili  0.4  /  DBili  x   /  AST  21  /  ALT  13  /  AlkPhos  87      PT/INR - ( 22 Aug 2022 22:30 )   PT: 12.6 sec;   INR: 1.09 ratio         PTT - ( 22 Aug 2022 22:30 )  PTT:35.3 sec      Urinalysis Basic - ( 23 Aug 2022 00:20 )    Color: Light Yellow / Appearance: Clear / S.041 / pH: x  Gluc: x / Ketone: Negative  / Bili: Negative / Urobili: <2 mg/dL   Blood: x / Protein: Trace / Nitrite: Negative   Leuk Esterase: Large / RBC: 0-2 /HPF / WBC 0-2 /HPF   Sq Epi: x / Non Sq Epi: 3-5 /HPF / Bacteria: Occasional        RADIOLOGY & ADDITIONAL TESTS:    Imaging Personally Reviewed:    Consultant(s) Notes Reviewed:      Care Discussed with Consultants/Other Providers:

## 2022-08-24 NOTE — PROGRESS NOTE ADULT - PROBLEM SELECTOR PLAN 1
CVA vs peripheral vertigo vs vestibular dysfx.   -CT Brain: No acute intracranial hemorrhage, mass effect, or evidence of acute vascular territorial infarct.   -CTA H&N - without evidence of dissection, LVO; showing mild right and mild to moderate left stenoses at the origins of the internal carotid arteries  -Orthostatics negative   -TTE:  Mild diastolic dysfunction, with normal LVEF, negative bubble   -Neurology consult appreciated   [ ]  MRI head, IAC w and w/o contrast  - c/w meclizine 12.5, If refractory to meclizine, can try Clonazepam 0.5mg now then Q8H PRN (for 2 to 3 days)  - refer for Vestibular Rehab on discharge  - Monitor on telemetry  - Elevate head of the bed to 30 degree  - Neuro checks Q4-6 Hrs  -  PT/OT consult -> subacute rehab

## 2022-08-25 LAB
ALBUMIN SERPL ELPH-MCNC: 3.9 G/DL — SIGNIFICANT CHANGE UP (ref 3.3–5)
ALP SERPL-CCNC: 88 U/L — SIGNIFICANT CHANGE UP (ref 40–120)
ALT FLD-CCNC: 15 U/L — SIGNIFICANT CHANGE UP (ref 4–33)
ANION GAP SERPL CALC-SCNC: 10 MMOL/L — SIGNIFICANT CHANGE UP (ref 7–14)
ANION GAP SERPL CALC-SCNC: 11 MMOL/L — SIGNIFICANT CHANGE UP (ref 7–14)
AST SERPL-CCNC: 19 U/L — SIGNIFICANT CHANGE UP (ref 4–32)
BILIRUB DIRECT SERPL-MCNC: <0.2 MG/DL — SIGNIFICANT CHANGE UP (ref 0–0.3)
BILIRUB INDIRECT FLD-MCNC: >0.1 MG/DL — SIGNIFICANT CHANGE UP (ref 0–1)
BILIRUB SERPL-MCNC: 0.3 MG/DL — SIGNIFICANT CHANGE UP (ref 0.2–1.2)
BUN SERPL-MCNC: 15 MG/DL — SIGNIFICANT CHANGE UP (ref 7–23)
BUN SERPL-MCNC: 16 MG/DL — SIGNIFICANT CHANGE UP (ref 7–23)
CALCIUM SERPL-MCNC: 9.4 MG/DL — SIGNIFICANT CHANGE UP (ref 8.4–10.5)
CALCIUM SERPL-MCNC: 9.8 MG/DL — SIGNIFICANT CHANGE UP (ref 8.4–10.5)
CHLORIDE SERPL-SCNC: 96 MMOL/L — LOW (ref 98–107)
CHLORIDE SERPL-SCNC: 96 MMOL/L — LOW (ref 98–107)
CK SERPL-CCNC: 117 U/L — SIGNIFICANT CHANGE UP (ref 25–170)
CO2 SERPL-SCNC: 25 MMOL/L — SIGNIFICANT CHANGE UP (ref 22–31)
CO2 SERPL-SCNC: 26 MMOL/L — SIGNIFICANT CHANGE UP (ref 22–31)
CREAT SERPL-MCNC: 0.78 MG/DL — SIGNIFICANT CHANGE UP (ref 0.5–1.3)
CREAT SERPL-MCNC: 0.78 MG/DL — SIGNIFICANT CHANGE UP (ref 0.5–1.3)
EGFR: 80 ML/MIN/1.73M2 — SIGNIFICANT CHANGE UP
EGFR: 80 ML/MIN/1.73M2 — SIGNIFICANT CHANGE UP
FOLATE SERPL-MCNC: 12.7 NG/ML — SIGNIFICANT CHANGE UP (ref 3.1–17.5)
GLUCOSE SERPL-MCNC: 104 MG/DL — HIGH (ref 70–99)
GLUCOSE SERPL-MCNC: 104 MG/DL — HIGH (ref 70–99)
HCT VFR BLD CALC: 37.5 % — SIGNIFICANT CHANGE UP (ref 34.5–45)
HCT VFR BLD CALC: 40.4 % — SIGNIFICANT CHANGE UP (ref 34.5–45)
HGB BLD-MCNC: 12.7 G/DL — SIGNIFICANT CHANGE UP (ref 11.5–15.5)
HGB BLD-MCNC: 13.3 G/DL — SIGNIFICANT CHANGE UP (ref 11.5–15.5)
MAGNESIUM SERPL-MCNC: 1.7 MG/DL — SIGNIFICANT CHANGE UP (ref 1.6–2.6)
MAGNESIUM SERPL-MCNC: 1.9 MG/DL — SIGNIFICANT CHANGE UP (ref 1.6–2.6)
MCHC RBC-ENTMCNC: 28.7 PG — SIGNIFICANT CHANGE UP (ref 27–34)
MCHC RBC-ENTMCNC: 29.4 PG — SIGNIFICANT CHANGE UP (ref 27–34)
MCHC RBC-ENTMCNC: 32.9 GM/DL — SIGNIFICANT CHANGE UP (ref 32–36)
MCHC RBC-ENTMCNC: 33.9 GM/DL — SIGNIFICANT CHANGE UP (ref 32–36)
MCV RBC AUTO: 86.8 FL — SIGNIFICANT CHANGE UP (ref 80–100)
MCV RBC AUTO: 87.1 FL — SIGNIFICANT CHANGE UP (ref 80–100)
NRBC # BLD: 0 /100 WBCS — SIGNIFICANT CHANGE UP (ref 0–0)
NRBC # BLD: 0 /100 WBCS — SIGNIFICANT CHANGE UP (ref 0–0)
NRBC # FLD: 0 K/UL — SIGNIFICANT CHANGE UP (ref 0–0)
NRBC # FLD: 0 K/UL — SIGNIFICANT CHANGE UP (ref 0–0)
PHOSPHATE SERPL-MCNC: 3.5 MG/DL — SIGNIFICANT CHANGE UP (ref 2.5–4.5)
PHOSPHATE SERPL-MCNC: 4.3 MG/DL — SIGNIFICANT CHANGE UP (ref 2.5–4.5)
PLATELET # BLD AUTO: 292 K/UL — SIGNIFICANT CHANGE UP (ref 150–400)
PLATELET # BLD AUTO: 295 K/UL — SIGNIFICANT CHANGE UP (ref 150–400)
POTASSIUM SERPL-MCNC: 3.6 MMOL/L — SIGNIFICANT CHANGE UP (ref 3.5–5.3)
POTASSIUM SERPL-MCNC: 4.3 MMOL/L — SIGNIFICANT CHANGE UP (ref 3.5–5.3)
POTASSIUM SERPL-SCNC: 3.6 MMOL/L — SIGNIFICANT CHANGE UP (ref 3.5–5.3)
POTASSIUM SERPL-SCNC: 4.3 MMOL/L — SIGNIFICANT CHANGE UP (ref 3.5–5.3)
PROT SERPL-MCNC: 7.4 G/DL — SIGNIFICANT CHANGE UP (ref 6–8.3)
RBC # BLD: 4.32 M/UL — SIGNIFICANT CHANGE UP (ref 3.8–5.2)
RBC # BLD: 4.64 M/UL — SIGNIFICANT CHANGE UP (ref 3.8–5.2)
RBC # FLD: 12.8 % — SIGNIFICANT CHANGE UP (ref 10.3–14.5)
RBC # FLD: 13 % — SIGNIFICANT CHANGE UP (ref 10.3–14.5)
SODIUM SERPL-SCNC: 132 MMOL/L — LOW (ref 135–145)
SODIUM SERPL-SCNC: 132 MMOL/L — LOW (ref 135–145)
VIT B12 SERPL-MCNC: 728 PG/ML — SIGNIFICANT CHANGE UP (ref 200–900)
WBC # BLD: 10.33 K/UL — SIGNIFICANT CHANGE UP (ref 3.8–10.5)
WBC # BLD: 8.42 K/UL — SIGNIFICANT CHANGE UP (ref 3.8–10.5)
WBC # FLD AUTO: 10.33 K/UL — SIGNIFICANT CHANGE UP (ref 3.8–10.5)
WBC # FLD AUTO: 8.42 K/UL — SIGNIFICANT CHANGE UP (ref 3.8–10.5)

## 2022-08-25 PROCEDURE — 73030 X-RAY EXAM OF SHOULDER: CPT | Mod: 26,LT

## 2022-08-25 PROCEDURE — 70553 MRI BRAIN STEM W/O & W/DYE: CPT | Mod: 26,76

## 2022-08-25 PROCEDURE — 99233 SBSQ HOSP IP/OBS HIGH 50: CPT

## 2022-08-25 RX ORDER — CLONAZEPAM 1 MG
0.5 TABLET ORAL AT BEDTIME
Refills: 0 | Status: DISCONTINUED | OUTPATIENT
Start: 2022-08-25 | End: 2022-08-25

## 2022-08-25 RX ADMIN — CEFTRIAXONE 100 MILLIGRAM(S): 500 INJECTION, POWDER, FOR SOLUTION INTRAMUSCULAR; INTRAVENOUS at 12:46

## 2022-08-25 RX ADMIN — Medication 1 GRAM(S): at 12:47

## 2022-08-25 RX ADMIN — Medication 1 GRAM(S): at 17:20

## 2022-08-25 RX ADMIN — Medication 15 MILLIGRAM(S): at 12:47

## 2022-08-25 RX ADMIN — Medication 1 GRAM(S): at 05:40

## 2022-08-25 RX ADMIN — PANTOPRAZOLE SODIUM 40 MILLIGRAM(S): 20 TABLET, DELAYED RELEASE ORAL at 05:40

## 2022-08-25 RX ADMIN — Medication 1 TABLET(S): at 12:47

## 2022-08-25 RX ADMIN — APIXABAN 5 MILLIGRAM(S): 2.5 TABLET, FILM COATED ORAL at 05:40

## 2022-08-25 RX ADMIN — APIXABAN 5 MILLIGRAM(S): 2.5 TABLET, FILM COATED ORAL at 17:20

## 2022-08-25 RX ADMIN — LOSARTAN POTASSIUM 100 MILLIGRAM(S): 100 TABLET, FILM COATED ORAL at 05:40

## 2022-08-25 RX ADMIN — Medication 12.5 MILLIGRAM(S): at 05:40

## 2022-08-25 RX ADMIN — Medication 650 MILLIGRAM(S): at 00:10

## 2022-08-25 RX ADMIN — Medication 0.5 MILLIGRAM(S): at 22:07

## 2022-08-25 NOTE — PROVIDER CONTACT NOTE (OTHER) - REASON
Patient complaining of dizziness
Patient HR sustaining 50-55
Patient complaining of muscle cramps
Patient HR sustaining 53;  dipped to 46 non sustained. Patient asymptomatic.

## 2022-08-25 NOTE — PROVIDER CONTACT NOTE (OTHER) - BACKGROUND
Patient admitted for r/o stroke
Patient admitted for r/o stroke
Admitted for r/o stroke.
Patient admitted for falls; r/o stroke

## 2022-08-25 NOTE — PROGRESS NOTE ADULT - SUBJECTIVE AND OBJECTIVE BOX
Patient is a 72y old  Female who presents with a chief complaint of Dizziness and unsteady gait (24 Aug 2022 15:31)    Arpan Grewal MD   [facility] of Hospital Medicine   Pager [phone]  Reachable on Microsoft Teams     SUBJECTIVE / OVERNIGHT EVENTS:  Patient seen and examined this morning. No overnight events. No events on tele.   Continues to have dizziness with room spinning. Not much better or worse   Feels like she is on a boat when laying down to sleep  Has not been requesting meclizine.     MEDICATIONS  (STANDING):  apixaban 5 milliGRAM(s) Oral every 12 hours  atorvastatin 40 milliGRAM(s) Oral at bedtime  diltiazem    milliGRAM(s) Oral daily  hydrochlorothiazide 12.5 milliGRAM(s) Oral daily  losartan 100 milliGRAM(s) Oral daily  multivitamin 1 Tablet(s) Oral daily  oxybutynin 15 milliGRAM(s) Oral daily  pantoprazole    Tablet 40 milliGRAM(s) Oral before breakfast  senna 2 Tablet(s) Oral at bedtime  sucralfate 1 Gram(s) Oral every 6 hours    MEDICATIONS  (PRN):  acetaminophen     Tablet .. 650 milliGRAM(s) Oral every 6 hours PRN Temp greater or equal to 38C (100.4F), Mild Pain (1 - 3), Moderate Pain (4 - 6)  meclizine 12.5 milliGRAM(s) Oral two times a day PRN Dizziness      Vital Signs Last 24 Hrs  T(C): 36.5 (25 Aug 2022 09:31), Max: 36.8 (25 Aug 2022 04:55)  T(F): 97.7 (25 Aug 2022 09:31), Max: 98.2 (25 Aug 2022 04:55)  HR: 89 (25 Aug 2022 09:48) (49 - 89)  BP: 119/54 (25 Aug 2022 09:31) (119/54 - 130/65)  BP(mean): --  RR: 17 (25 Aug 2022 09:31) (16 - 17)  SpO2: 98% (25 Aug 2022 09:48) (97% - 100%)  CAPILLARY BLOOD GLUCOSE        I&O's Summary      General: NAD, awake and alert  Eyes: no nystagmus, conjunctiva clear, nonicteric sclera  HENMT: NCAT, MMM  Respiratory: No respiratory distress, CTABL, No rales, rhonchi, wheezing.  Cardiovascular: S1,S2; Regular rate and rhythm; No m/g/r. 2+ peripheral pulses  Gastrointestinal: Soft, Nontender, Nondistended; +BS.   Extremities: No c/c/e; warm to touch. ROM limited on left shoulder due to pain  Neurological: CN II-XII in tact. 5/5 strength in b/l biceps, triceps, handgrip, knee flexion, knee extension, plantar flexion. Sensation to LT grossly in tact in BLEs.   Skin: No rashes, No erythema   Psych: appropriate mood and affect    LABS:                        13.3   8.42  )-----------( 295      ( 25 Aug 2022 05:15 )             40.4     08-25    132<L>  |  96<L>  |  15  ----------------------------<  104<H>  4.3   |  26  |  0.78    Ca    9.8      25 Aug 2022 05:15  Phos  4.3     08-25  Mg     1.90     08-25    TPro  7.6  /  Alb  3.9  /  TBili  0.4  /  DBili  x   /  AST  21  /  ALT  13  /  AlkPhos  87  08-24              RADIOLOGY & ADDITIONAL TESTS:    Imaging Personally Reviewed:    Consultant(s) Notes Reviewed:      Care Discussed with Consultants/Other Providers:

## 2022-08-25 NOTE — PROVIDER CONTACT NOTE (OTHER) - ACTION/TREATMENT ORDERED:
No interventions at this time, will continue to monitor.
Patient educated to bring in home rosuvastatin to be verified by pharmacy. Will continue to monitor.
ACP notified, will continue to monitor.
Will continue to monitor.

## 2022-08-25 NOTE — PROVIDER CONTACT NOTE (OTHER) - SITUATION
Patient HR sustaining 53;  dipped to 46 non sustained. Patient asymptomatic.
Patient complaining of dizziness
Patient complaining of muscle cramps. Per patient, has gotten cramps from lipitor in the past. Patient is on rosuvastatin at home.
Patient HR sustaining 50-55

## 2022-08-25 NOTE — PROVIDER CONTACT NOTE (OTHER) - RECOMMENDATIONS
ACP notified. No interventions at this time. As per ACP ok to go off tele for MRI.
ACP notified, will continue to monitor.
ACP notified, will continue to monitor.
ACP notified. As per ACP, no interventions at this time.

## 2022-08-25 NOTE — CHART NOTE - NSCHARTNOTEFT_GEN_A_CORE
[facility] NIGHT MEDICINE COVERAGE    Notified by RN that pt c/o b/l calf pain and cramping, pt saying she gets this pain when she takes Atorvastatin.  Pt assessed at bedside.  Pt reports she takes Crestor for her cholesterol as she was unable to tolerate Atorvastatin previously.  She reports she experienced b/l severe calf pain and cramping earlier, it has improved significantly from earlier, she said she received one dose of Atorvastatin last night.  She denies chest pain, difficulty breathing, dizziness, numbness, or tingling.    Vital Signs Last 24 Hrs  T(C): 36.5 (25 Aug 2022 21:53), Max: 36.8 (25 Aug 2022 04:55)  T(F): 97.7 (25 Aug 2022 21:53), Max: 98.2 (25 Aug 2022 04:55)  HR: 62 (25 Aug 2022 21:53) (49 - 89)  BP: 139/68 (25 Aug 2022 21:53) (119/54 - 139/68)  RR: 17 (25 Aug 2022 21:53) (16 - 18)  SpO2: 99% (25 Aug 2022 21:53) (97% - 99%)    O2 Parameters below as of 25 Aug 2022 21:53  Patient On (Oxygen Delivery Method): room air    PE:  GS: A&Ox3, in NAD, calm and cooperative  Neuro: CHAVEZ x4, non-focal  Extremities: No LE edema b/l, no TTP over b/l calves, DP pulses 2+ b/l.    Plan:  -Check CMP, CK, CBC, and repeat UA  -Check b/l LE DVT study given acute b/l calf pain  -Pain control w/ PRN Tylenol  -D/c Atorvastatin, will add as intolerance to pt allergy summary, pt said she can have family bring in her home Crestor for verification.  -Pt hemodynamically stable    Plan d/w RN and pt, all questions answered.  Pt stable at this time, will continue to monitor.    Berhane Galvin PA-C  Department of Medicine - [facility]  In-House Pager: #[phone]

## 2022-08-25 NOTE — PROGRESS NOTE ADULT - PROBLEM SELECTOR PLAN 1
CVA vs peripheral vertigo vs vestibular dysfx.   -CT Brain: No acute intracranial hemorrhage, mass effect, or evidence of acute vascular territorial infarct.   -CTA H&N - without evidence of dissection, LVO; showing mild right and mild to moderate left stenoses at the origins of the internal carotid arteries  -Orthostatics negative   -TTE:  Mild diastolic dysfunction, with normal LVEF, negative bubble   -Neurology consult appreciated   [ ]  MRI head, IAC w and w/o contrast  - c/w meclizine 12.5, If refractory to meclizine, will try clonazepam x1 dose tonight  - refer for Vestibular Rehab on discharge  - Monitor on telemetry  - Elevate head of the bed to 30 degree  - Neuro checks Q4-6 Hrs  -  PT/OT consult -> subacute rehab

## 2022-08-25 NOTE — PROVIDER CONTACT NOTE (OTHER) - ASSESSMENT
Complaining of muscle cramps
Patient asymptomatic, denies HA, CP, dizziness, SOB at this time
Patient complaining of dizziness; VSS; patient denies any other symptoms.
Denies ha, cp, dizziness, or any other symptoms at this time.

## 2022-08-26 ENCOUNTER — TRANSCRIPTION ENCOUNTER (OUTPATIENT)
Age: 72
End: 2022-08-26

## 2022-08-26 PROCEDURE — 99232 SBSQ HOSP IP/OBS MODERATE 35: CPT

## 2022-08-26 PROCEDURE — 93970 EXTREMITY STUDY: CPT | Mod: 26

## 2022-08-26 RX ORDER — ROSUVASTATIN CALCIUM 5 MG/1
10 TABLET ORAL AT BEDTIME
Refills: 0 | Status: DISCONTINUED | OUTPATIENT
Start: 2022-08-26 | End: 2022-08-30

## 2022-08-26 RX ORDER — POLYETHYLENE GLYCOL 3350 17 G/17G
17 POWDER, FOR SOLUTION ORAL DAILY
Refills: 0 | Status: DISCONTINUED | OUTPATIENT
Start: 2022-08-26 | End: 2022-08-30

## 2022-08-26 RX ORDER — LANOLIN ALCOHOL/MO/W.PET/CERES
3 CREAM (GRAM) TOPICAL AT BEDTIME
Refills: 0 | Status: DISCONTINUED | OUTPATIENT
Start: 2022-08-26 | End: 2022-08-30

## 2022-08-26 RX ADMIN — Medication 1 GRAM(S): at 18:25

## 2022-08-26 RX ADMIN — ROSUVASTATIN CALCIUM 10 MILLIGRAM(S): 5 TABLET ORAL at 22:25

## 2022-08-26 RX ADMIN — APIXABAN 5 MILLIGRAM(S): 2.5 TABLET, FILM COATED ORAL at 18:25

## 2022-08-26 RX ADMIN — LOSARTAN POTASSIUM 100 MILLIGRAM(S): 100 TABLET, FILM COATED ORAL at 06:25

## 2022-08-26 RX ADMIN — Medication 1 GRAM(S): at 12:38

## 2022-08-26 RX ADMIN — Medication 12.5 MILLIGRAM(S): at 08:56

## 2022-08-26 RX ADMIN — SENNA PLUS 2 TABLET(S): 8.6 TABLET ORAL at 22:24

## 2022-08-26 RX ADMIN — Medication 300 MILLIGRAM(S): at 06:25

## 2022-08-26 RX ADMIN — PANTOPRAZOLE SODIUM 40 MILLIGRAM(S): 20 TABLET, DELAYED RELEASE ORAL at 06:24

## 2022-08-26 RX ADMIN — Medication 15 MILLIGRAM(S): at 12:38

## 2022-08-26 RX ADMIN — Medication 3 MILLIGRAM(S): at 22:25

## 2022-08-26 RX ADMIN — APIXABAN 5 MILLIGRAM(S): 2.5 TABLET, FILM COATED ORAL at 06:24

## 2022-08-26 RX ADMIN — Medication 12.5 MILLIGRAM(S): at 06:25

## 2022-08-26 RX ADMIN — Medication 1 TABLET(S): at 12:38

## 2022-08-26 RX ADMIN — Medication 1 GRAM(S): at 06:23

## 2022-08-26 RX ADMIN — POLYETHYLENE GLYCOL 3350 17 GRAM(S): 17 POWDER, FOR SOLUTION ORAL at 18:24

## 2022-08-26 NOTE — DISCHARGE NOTE PROVIDER - DISCHARGE DIET
0700- Assumed care of Mr. Vaughn Verduzco from off going nurse. Bedside report received. He is currently resting in bed. Denies pain, voices no other concerns. Call bell within reach. All needs have currently been met. Patient's most recent vital signs:  Blood pressure (!) 163/81, pulse (!) 57, temperature 98.1 °F (36.7 °C), resp. rate 18, height 6' 2\" (1.88 m), weight 101.4 kg (223 lb 8 oz), SpO2 96 %. 3370- In to give morning meds, MD in as well. Answered all patient questions. Will prepare discharge paperwork after orders are placed. DASH Diet

## 2022-08-26 NOTE — DISCHARGE NOTE PROVIDER - NSDCMRMEDTOKEN_GEN_ALL_CORE_FT
apixaban 5 mg oral tablet: 1 tab(s) orally every 12 hours  DILTIAZEM 24H ER(CD) 300 MG CP: TAKE 1 CAPSULE BY MOUTH EVERY DAY  docusate sodium 100 mg oral tablet: 1 tab(s) orally 2 times a day, As Needed  losartan-hydrochlorothiazide 100 mg-12.5 mg oral tablet: 1 tab(s) orally once a day PM  OXYBUTYNIN CHLORIDE ER 15MG ER TAB:   pantoprazole 40 mg oral delayed release tablet: 1 tab(s) orally once a day   apixaban 5 mg oral tablet: 1 tab(s) orally every 12 hours  DILTIAZEM 24H ER(CD) 300 MG CP: TAKE 1 CAPSULE BY MOUTH EVERY DAY  docusate sodium 100 mg oral tablet: 1 tab(s) orally 2 times a day, As Needed  losartan-hydrochlorothiazide 100 mg-12.5 mg oral tablet: 1 tab(s) orally once a day PM  OXYBUTYNIN CHLORIDE ER 15MG ER TAB:   pantoprazole 40 mg oral delayed release tablet: 1 tab(s) orally once a day  Physical Therapy: 1 session 2 to 3 times a week for 6 weeks   apixaban 5 mg oral tablet: 1 tab(s) orally every 12 hours  DILTIAZEM 24H ER(CD) 300 MG CP: TAKE 1 CAPSULE BY MOUTH EVERY DAY  docusate sodium 100 mg oral tablet: 1 tab(s) orally 2 times a day, As Needed  losartan-hydrochlorothiazide 100 mg-12.5 mg oral tablet: 1 tab(s) orally once a day PM  meclizine 12.5 mg oral tablet: 1 tab(s) orally 2 times a day, As needed, Dizziness  Multiple Vitamins oral tablet: 1 tab(s) orally once a day  OXYBUTYNIN CHLORIDE ER 15MG ER TAB:   pantoprazole 40 mg oral delayed release tablet: 1 tab(s) orally once a day  Physical Therapy: 1 session 2 to 3 times a week for 6 weeks  rosuvastatin 10 mg oral tablet: 1 tab(s) orally once a day (at bedtime)

## 2022-08-26 NOTE — PROGRESS NOTE ADULT - PROBLEM SELECTOR PLAN 3
Patient with history of Afib on Eliquis- S/P Ablation on 3/2022  As per neuro, ok to continue with Eliquis for Afib for secondary stroke prevention. Patient with history of Afib on Eliquis- S/P Ablation on 3/2022  continue with Eliquis at home dose of 5 BID

## 2022-08-26 NOTE — DISCHARGE NOTE PROVIDER - PROVIDER TOKENS
FREE:[LAST:[Your],FIRST:[Neurologist],PHONE:[(   )    -],FAX:[(   )    -],FOLLOWUP:[2 weeks]],FREE:[LAST:[Your],FIRST:[Primary Care Doctor],PHONE:[(   )    -],FAX:[(   )    -],FOLLOWUP:[2 weeks]]

## 2022-08-26 NOTE — DISCHARGE NOTE PROVIDER - NSFOLLOWUPCLINICS_GEN_ALL_ED_FT
[facility]  Neurology  [address] [place] [address] [address] [place] [address]  Phone: [phone]  Fax:   Follow Up Time: 1 month

## 2022-08-26 NOTE — PROGRESS NOTE ADULT - SUBJECTIVE AND OBJECTIVE BOX
Patient is a 72y old  Female who presents with a chief complaint of Dizziness and unsteady gait (26 Aug 2022 08:10)    Arpan Grewal MD   [facility] Division of Hospital Medicine   Pager [phone]  Reachable on [employer] Teams     SUBJECTIVE / OVERNIGHT EVENTS:  Patient seen and examined this morning. Still with dizziness, but was able to get sleep last night.   Having some aura even when she is closing her eyes. Discomfort in shoulder/ chest is improving       MEDICATIONS  (STANDING):  apixaban 5 milliGRAM(s) Oral every 12 hours  diltiazem    milliGRAM(s) Oral daily  hydrochlorothiazide 12.5 milliGRAM(s) Oral daily  losartan 100 milliGRAM(s) Oral daily  multivitamin 1 Tablet(s) Oral daily  oxybutynin 15 milliGRAM(s) Oral daily  pantoprazole    Tablet 40 milliGRAM(s) Oral before breakfast  polyethylene glycol 3350 17 Gram(s) Oral daily  senna 2 Tablet(s) Oral at bedtime  sucralfate 1 Gram(s) Oral every 6 hours    MEDICATIONS  (PRN):  acetaminophen     Tablet .. 650 milliGRAM(s) Oral every 6 hours PRN Temp greater or equal to 38C (100.4F), Mild Pain (1 - 3), Moderate Pain (4 - 6)  meclizine 12.5 milliGRAM(s) Oral two times a day PRN Dizziness      Vital Signs Last 24 Hrs  T(C): 36.6 (26 Aug 2022 08:53), Max: 36.6 (25 Aug 2022 18:00)  T(F): 97.9 (26 Aug 2022 08:53), Max: 97.9 (26 Aug 2022 08:53)  HR: 62 (26 Aug 2022 08:53) (56 - 62)  BP: 122/58 (26 Aug 2022 08:53) (122/58 - 141/65)  BP(mean): --  RR: 17 (26 Aug 2022 08:53) (17 - 18)  SpO2: 100% (26 Aug 2022 08:53) (97% - 100%)  CAPILLARY BLOOD GLUCOSE        I&O's Summary      General: NAD, awake and alert  Eyes: no nystagmus, conjunctiva clear, nonicteric sclera  HENMT: NCAT, MMM  Respiratory: No respiratory distress, CTABL, No rales, rhonchi, wheezing.  Cardiovascular: S1,S2; Regular rate and rhythm; No m/g/r. 2+ peripheral pulses  Gastrointestinal: Soft, Nontender, Nondistended; +BS.   Extremities: No c/c/e; warm to touch. ROM limited on left shoulder due to pain  Skin: No rashes, No erythema   Psych: appropriate mood and affect    LABS:                        12.7   10.33 )-----------( 292      ( 25 Aug 2022 23:02 )             37.5     08-25    132<L>  |  96<L>  |  16  ----------------------------<  104<H>  3.6   |  25  |  0.78    Ca    9.4      25 Aug 2022 23:02  Phos  3.5     08-25  Mg     1.70     08-25    TPro  7.4  /  Alb  3.9  /  TBili  0.3  /  DBili  <0.2  /  AST  19  /  ALT  15  /  AlkPhos  88  08-25      CARDIAC MARKERS ( 25 Aug 2022 23:02 )  x     / x     / 117 U/L / x     / x              RADIOLOGY & ADDITIONAL TESTS:    Imaging Personally Reviewed:    Consultant(s) Notes Reviewed:      Care Discussed with Consultants/Other Providers:

## 2022-08-26 NOTE — CHART NOTE - NSCHARTNOTEFT_GEN_A_CORE
Neurology reviewed MRI result as noted below:    < from: MR IAC w/wo IV Cont (08.25.22 @ 13:26) >  IMPRESSION:  -No acute infarct, hemorrhage, or mass.  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 No hydrocephalus.  -Mild chronic small vessel disease.  --- End of Report ---  < end of copied text >    At this time, vertigo likely of peripheral etiology, likely BPPV  LDL noted to be 110 and a1c 6.0    Recommendations:  [] No further inpatient neurology work up indicated at this time  [] Meclizine 12.5mg BID PRN (can increase to 50mg Q8)  [] If refractory to meclizine, can try Clonzepam 0.5mg now then Q8H PRN (for 2 to 3 days)  [] Refer for [facility] on discharge  [] Epley maneuver print out  [] C/w home eliquis for secondary stroke prevention given atrial fibrillation  [] Would start atorvastatin 40mg PO (titrate to LDL <70)  [] A1c management per primary care  [] Neurology to sign off at this time. Please call [phone] for further questions or concerns    Nilda Mullins  Neurology PGY3 Neurology reviewed MRI result as noted below:    < from: MR IAC w/wo IV Cont (08.25.22 @ 13:26) >  IMPRESSION:  -No acute infarct, hemorrhage, or mass.  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 No hydrocephalus.  -Mild chronic small vessel disease.  --- End of Report ---  < end of copied text >    At this time, vertigo likely of peripheral etiology, likely BPPV  LDL noted to be 110 and a1c 6.0    Recommendations:  [] No further inpatient neurology work up indicated at this time  [] Meclizine 12.5mg BID PRN (can increase to 50mg Q8)  [] If refractory to meclizine, can try Clonzepam 0.5mg now then Q8H PRN (for 2 to 3 days)  [] Refer for [facility] on discharge  [] Epley maneuver print out  [] C/w home eliquis for secondary stroke prevention given atrial fibrillation  [] Would start atorvastatin 40mg PO (titrate to LDL <70)  [] A1c management per primary care  [] Neurology to sign off at this time. Please call [phone] for further questions or concerns    Nilda Mullins  Neurology PGY3    Thank you

## 2022-08-26 NOTE — DISCHARGE NOTE PROVIDER - HOSPITAL COURSE
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 Patient admitted for management of vertigo with possible stroke.     Vertigo.   ·  Plan: CVA vs peripheral vertigo vs vestibular dysfx.   -CT Brain: No acute intracranial hemorrhage, mass effect, or evidence of acute vascular territorial infarct.   -CTA H&N - without evidence of dissection, LVO; showing mild right and mild to moderate left stenoses at the origins of the internal carotid arteries  -Orthostatics negative   -TTE:  Mild diastolic dysfunction, with normal LVEF, negative bubble   -Neurology consult appreciated   [ ]  MRI head, IAC w and w/o contrast  - c/w meclizine 12.5, If refractory to meclizine, will try clonazepam x1 dose tonight  - refer for Vestibular Rehab on discharge  - Monitor on telemetry  - Elevate head of the bed to 30 degree  - Neuro checks Q4-6 Hrs  -  PT/OT consult -> subacute rehab.    Acute lower UTI (urinary tract infection).   ·  Plan: increased urinary frequency for the past few days,   UA noted for occasional bacteria, leukocytesterase , however no pyuria   UCx with GP organisms, f/u sensitivities   c/w ceftriaxone, likely 3 day course.    Atrial fibrillation.   ·  Plan: Patient with history of Afib on Eliquis- S/P Ablation on 3/2022  As per neuro, ok to continue with Eliquis for Afib for secondary stroke prevention.    Atypical chest pain.   ·  Plan: Patient with atypical chest pain, reproducible with movement of her left shoulder, likely MSK pain   Serial EKGs - no acute ischemic changes and cardiac enzymes - troponin 7  TTE with preserved EF and without evidence of WMA   Monitor on telemetry  [ ] left shoulder XR.     HTN (hypertension).   ·  Plan: - HTN -  Stable  - DASH diet  - c/w home meds w/ holding parameters  - monitor BP & titrate BP meds PRN.    GERD (gastroesophageal reflux disease).   ·  Plan: Patient with history of Gastroesophageal reflux disease  - Continue with PPI from home with trial of Carafate 1gm Q6hrs  - Avoid spicy and aggravating foods.     HARIS on CPAP.   ·  Plan: c/w CPAP at home settings.     Preventive measure.   ·  Plan: DVT prophylaxis - Patient on Eliquis 5 mg BID for Afib.  Diet: DASH  Dispo: PT-> [facility].    On ___ this case was reviewed with  ____, the patient is medically stable and optimized for discharge. All medications were reviewed and prescriptions were sent to mutually agreed upon pharmacy.     73 y/o female Jewish with PMH of HTN, multiple prior CVAs (pt reports asymptomatic), Atrial fibrillation diagnosed on 7/2021 s/p ablation 3/2022 on Eliquis, HARIS w/ CPAP, right knee replacement 2017, presents to ED c/o dizziness and difficulty walking. Patient admitted for management of vertigo with possible stroke.     Vertigo.   -CVA vs peripheral vertigo vs vestibular dysfx.   -CT Brain: No acute intracranial hemorrhage, mass effect, or evidence of acute vascular territorial infarct.   -CTA H&N - without evidence of dissection, LVO; showing mild right and mild to moderate left stenoses at the origins of the internal carotid arteries  -Orthostatics negative   -TTE:  Mild diastolic dysfunction, with normal LVEF, negative bubble   -MRI head, IAC w and w/o contrast- No acute infarct, hemorrhage, or mass, Chiari I malformation is suggested.  -Neurology consult appreciated, no further inpatient workup required  -c/w meclizine 12.5 BID PRN  -Vestibular Rehab on discharge  -PT/OT consult -> subacute rehab, potential discharge today.    Acute lower UTI (urinary tract infection).   -increased urinary frequency for the past few days,   -UA noted for occasional bacteria, leukocytesterase , however no pyuria   -UCx with GP organisms, f/u sensitivities   -c/w ceftriaxone, likely 3 day course.    Atrial fibrillation.   - Patient with history of Afib on Eliquis- S/P Ablation on 3/2022  - As per neuro, ok to continue with Eliquis for Afib for secondary stroke prevention.    Atypical chest pain.   -Patient with atypical chest pain, reproducible with movement of her left shoulder, likely MSK pain   -Serial EKGs - no acute ischemic changes and cardiac enzymes - troponin 7  -TTE with preserved EF and without evidence of WMA   -Monitor on telemetry  -Left shoulder XR without fracture and unchanged prior studies --> preserved joint spaces, and suggestive of possible rotator cuff injury   -resolved  -OP follow up with ortho/PT.    HTN (hypertension).   - HTN -  Stable  - DASH diet  - c/w home meds w/ holding parameters  - monitor BP & titrate BP meds PRN.    GERD (gastroesophageal reflux disease).   - Patient with history of Gastroesophageal reflux disease  - Continue with PPI from home with trial of Carafate 1gm Q6hrs  - Avoid spicy and aggravating foods.    HLD (hyperlipidemia).   -not tolerating atorvastatin   -c/w home crestor.    HARIS on CPAP.   - c/w CPAP at home settings.    Memory loss.   -1 year of memory loss per patient, sometimes difficulty remembering phone numbers and names  -OP follow with neurology.    DVT prophylaxis - Patient on Eliquis 5 mg BID for Afib.  PT: [facility].    On ___ this case was reviewed with Dr. Samy Coy, the patient is medically stable and optimized for discharge. All medications were reviewed and prescriptions were sent to mutually agreed upon pharmacy.     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 Patient admitted for management of vertigo with possible stroke.     Vertigo.   -CVA vs peripheral vertigo vs vestibular dysfx.   -CT Brain: No acute intracranial hemorrhage, mass effect, or evidence of acute vascular territorial infarct.   -CTA H&N - without evidence of dissection, LVO; showing mild right and mild to moderate left stenoses at the origins of the internal carotid arteries  -Orthostatics negative   -TTE:  Mild diastolic dysfunction, with normal LVEF, negative bubble   -MRI head, IAC w and w/o contrast- No acute infarct, hemorrhage, or mass, Chiari I malformation is suggested.  -Neurology consult appreciated, no further inpatient workup required  -c/w meclizine 12.5 BID PRN  -Vestibular Rehab on discharge  -PT/OT consult -> subacute rehab, potential discharge today.    Acute lower UTI (urinary tract infection).   -increased urinary frequency for the past few days,   -UA noted for occasional bacteria, leukocytesterase , however no pyuria   -UCx with GP organisms, f/u sensitivities   -c/w ceftriaxone, likely 3 day course.    Atrial fibrillation.   - Patient with history of Afib on Eliquis- S/P Ablation on 3/2022  - As per neuro, ok to continue with Eliquis for Afib for secondary stroke prevention.    Atypical chest pain.   -Patient with atypical chest pain, reproducible with movement of her left shoulder, likely MSK pain   -Serial EKGs - no acute ischemic changes and cardiac enzymes - troponin 7  -TTE with preserved EF and without evidence of WMA   -Monitor on telemetry  -Left shoulder XR without fracture and unchanged prior studies --> preserved joint spaces, and suggestive of possible rotator cuff injury   -resolved  -OP follow up with ortho/PT.    HTN (hypertension).   - HTN -  Stable  - DASH diet  - c/w home meds w/ holding parameters  - monitor BP & titrate BP meds PRN.    GERD (gastroesophageal reflux disease).   - Patient with history of Gastroesophageal reflux disease  - Continue with PPI from home with trial of Carafate 1gm Q6hrs  - Avoid spicy and aggravating foods.    HLD (hyperlipidemia).   -not tolerating atorvastatin   -c/w home crestor.    HARIS on CPAP.   - c/w CPAP at home settings.    Memory loss.   -1 year of memory loss per patient, sometimes difficulty remembering phone numbers and names  -OP follow with neurology.    DVT prophylaxis - Patient on Eliquis 5 mg BID for Afib.      On 8/30/2022, this case was reviewed with Dr. Samy Coy, the patient is medically stable for discharge home. All medications were reviewed and prescriptions were sent to mutually agreed upon pharmacy.     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 Patient admitted for management of vertigo.     Vertigo.   -CVA vs peripheral vertigo vs vestibular dysfx.   -CT Brain: No acute intracranial hemorrhage, mass effect, or evidence of acute vascular territorial infarct.   -CTA H&N - without evidence of dissection, LVO; showing mild right and mild to moderate left stenoses at the origins of the internal carotid arteries  -Orthostatics negative   -TTE:  Mild diastolic dysfunction, with normal LVEF, negative bubble   -MRI head, IAC w and w/o contrast- No acute infarct, hemorrhage, or mass, Chiari I malformation is suggested.  -Neurology consult appreciated, no further inpatient workup required  -c/w meclizine 12.5 BID PRN  -Vestibular Rehab on discharge  -PT/OT consult -> subacute rehab, patient preferred to go home     Acute lower UTI (urinary tract infection).   -increased urinary frequency for the past few days,   -UA noted for occasional bacteria, leukocytesterase , however no pyuria   -UCx with GP organisms  -c/w received 3 day course of ceftriaxone     Atrial fibrillation.   - Patient with history of Afib on Eliquis- S/P Ablation on 3/2022  - As per neuro, ok to continue with Eliquis for Afib for secondary stroke prevention.    Atypical chest pain.   -Patient with atypical chest pain, reproducible with movement of her left shoulder, likely MSK pain   -Serial EKGs - no acute ischemic changes and cardiac enzymes - troponin 7  -TTE with preserved EF and without evidence of WMA   -Monitor on telemetry  -Left shoulder XR without fracture and unchanged prior studies --> preserved joint spaces, and suggestive of possible rotator cuff injury   -resolved  -OP follow up with ortho/PT.    HTN (hypertension).   - HTN -  Stable  - DASH diet  - c/w home meds w/ holding parameters  - monitor BP & titrate BP meds PRN.    GERD (gastroesophageal reflux disease).   - Patient with history of Gastroesophageal reflux disease  - Continue with PPI from home with trial of Carafate 1gm Q6hrs  - Avoid spicy and aggravating foods.    HLD (hyperlipidemia).   -not tolerating atorvastatin   -c/w home crestor.    HARIS on CPAP.   - c/w CPAP at home settings.    Memory loss.   -1 year of memory loss per patient, sometimes difficulty remembering phone numbers and names  -OP follow with neurology.    DVT prophylaxis - Patient on Eliquis 5 mg BID for Afib.      On 8/30/2022, this case was reviewed with Dr. Samy Coy, the patient is medically stable for discharge home. All medications were reviewed and prescriptions were sent to mutually agreed upon pharmacy.

## 2022-08-26 NOTE — PROGRESS NOTE ADULT - PROBLEM SELECTOR PLAN 4
Patient with atypical chest pain, reproducible with movement of her left shoulder, likely MSK pain   Serial EKGs - no acute ischemic changes and cardiac enzymes - troponin 7  TTE with preserved EF and without evidence of WMA   Monitor on telemetry  [ ] left shoulder XR Patient with atypical chest pain, reproducible with movement of her left shoulder, likely MSK pain   Serial EKGs - no acute ischemic changes and cardiac enzymes - troponin 7  TTE with preserved EF and without evidence of WMA   Monitor on telemetry  left shoulder without fracture and unchanged prior studies --> preserved joint spaces, and suggestive of possible rotator cuff injury   OP follow up with ortho/PT

## 2022-08-26 NOTE — DISCHARGE NOTE PROVIDER - NSDCCPCAREPLAN_GEN_ALL_CORE_FT
PRINCIPAL DISCHARGE DIAGNOSIS  Diagnosis: Vertigo  Assessment and Plan of Treatment: You had  CT Brain: No acute intracranial hemorrhage, mass effect, or evidence of acute vascular territorial infarct. CTAngio scan of your Head and Neck - without evidence of dissection,  mild right and mild to moderate left carotid stenoses at the origins of the internal carotid arteries  Your orthostatic blood pressure was negative.   You had an echocardiogram that revealed preserved heart function. Neurology was consulted.   [ ]  MRI head, IAC w and w/o contrast  continue on meclizine   - refer for Vestibular Rehab on discharge  -  PT/OT consult -> subacute rehab.      SECONDARY DISCHARGE DIAGNOSES  Diagnosis: Atrial fibrillation  Assessment and Plan of Treatment: Continue Apixaban.  Follow up with cardiologist within one week of discharge. Call for appointment.  Continue to take your blood thinner as prescribed and follow with your physician to monitor your levels.  Low fat diet, reduce caffeine intake, and exercise at least 30 minutes daily.      Diagnosis: HARIS on CPAP  Assessment and Plan of Treatment: Continue CPAP at bedtime on discharge    Diagnosis: Acute lower UTI (urinary tract infection)  Assessment and Plan of Treatment: You completed antibiotic course. Monitor for signs/symptoms of infection, such as, fever/chills, burning/pain with urination, urinary frequency/hesitancy, cloudy urine, or blood in urine. Please follow up with your primary care physician regarding your hospitalization      Diagnosis: GERD (gastroesophageal reflux disease)  Assessment and Plan of Treatment: Continue over-the-counter/prescribed acid-reducing agents and avoid acidic/spicy foods in order to decrease your symptom frequency. Optimize your weight with proper diet and exercise. Follow-up with your primary care provider for further management.      Diagnosis: HTN (hypertension)  Assessment and Plan of Treatment: Continue blood pressure medication regimen as directed. Monitor for any visual changes, headaches or dizziness.  Monitor blood pressure regularly.  Follow up with your PCP for further management for high blood pressure.       PRINCIPAL DISCHARGE DIAGNOSIS  Diagnosis: Vertigo  Assessment and Plan of Treatment: You had imaging done to determine the cause of your dizziness.  -CT Brain: No acute intracranial hemorrhage, mass effect, or evidence of acute vascular territorial infarct.   -CT Angio scan of your Head and Neck - without evidence of dissection,  mild right and mild to moderate left carotid stenoses at the origins of the internal carotid arteries  Your orthostatic blood pressure was negative.  You had an echocardiogram that revealed preserved heart function. Neurology was consulted.   - MRI head, IAC w and w/o contrast showed no acute infarct, hemorrhage, or mass, Chiari I malformation is suggested.  Neurology stated no further inpatient workup was required and you were referred for Vestibular Rehab on discharge.  You should continue your meclizine.  Please follow up with your primary care physician regarding your hospitalization and for further monitoring/management.        SECONDARY DISCHARGE DIAGNOSES  Diagnosis: HTN (hypertension)  Assessment and Plan of Treatment: Continue blood pressure medication regimen as directed. Monitor for any visual changes, headaches or dizziness.  Monitor blood pressure regularly.  Follow up with your PCP for further management for high blood pressure.      Diagnosis: Atrial fibrillation  Assessment and Plan of Treatment: Continue Apixaban.  Follow up with cardiologist within one week of discharge. Call for appointment.  Continue to take your blood thinner as prescribed and follow with your physician to monitor your levels.  Low fat diet, reduce caffeine intake, and exercise at least 30 minutes daily.      Diagnosis: GERD (gastroesophageal reflux disease)  Assessment and Plan of Treatment: Continue over-the-counter/prescribed acid-reducing agents and avoid acidic/spicy foods in order to decrease your symptom frequency. Optimize your weight with proper diet and exercise. Follow-up with your primary care provider for further management.      Diagnosis: Acute lower UTI (urinary tract infection)  Assessment and Plan of Treatment: You completed antibiotic course. Monitor for signs/symptoms of infection, such as, fever/chills, burning/pain with urination, urinary frequency/hesitancy, cloudy urine, or blood in urine. Please follow up with your primary care physician regarding your hospitalization      Diagnosis: HARIS on CPAP  Assessment and Plan of Treatment: Continue CPAP at bedtime on discharge    Diagnosis: Atypical chest pain  Assessment and Plan of Treatment: Your EKG and cardia enzymes were unremarkable. This pain is likely musculoskeletal. Please follow up with your primary care doctor for further management and a referral for orthopedic/physical therapy outpatient.    Diagnosis: HLD (hyperlipidemia)  Assessment and Plan of Treatment: Continue cholesterol control medications. Continue DASH diet. Follow up with your PCP within 1 week of discharge for further management and monitoring of lipid and cholesterol panels. Please call to make an appointment       PRINCIPAL DISCHARGE DIAGNOSIS  Diagnosis: Vertigo  Assessment and Plan of Treatment: You had imaging done to determine the cause of your dizziness.  -CT Brain: No acute intracranial hemorrhage, mass effect, or evidence of acute vascular territorial infarct.   -CT Angio scan of your Head and Neck - without evidence of dissection,  mild right and mild to moderate left carotid stenoses at the origins of the internal carotid arteries  Your orthostatic blood pressure was negative.  You had an echocardiogram that revealed preserved heart function. Neurology was consulted.   - MRI head, IAC w and w/o contrast showed no acute infarct, hemorrhage, or mass, Chiari I malformation is suggested.  Neurology stated no further inpatient workup was required and you were referred for Vestibular Rehab on discharge.  You should continue your meclizine.  Please follow up with your primary care physician regarding your hospitalization and for further monitoring/management.        SECONDARY DISCHARGE DIAGNOSES  Diagnosis: HTN (hypertension)  Assessment and Plan of Treatment: Continue blood pressure medication regimen as directed. Monitor for any visual changes, headaches or dizziness.  Monitor blood pressure regularly.  Follow up with your PCP for further management for high blood pressure.      Diagnosis: Atrial fibrillation  Assessment and Plan of Treatment: Continue Apixaban.  Follow up with cardiologist within one week of discharge. Call for appointment.  Continue to take your blood thinner as prescribed and follow with your physician to monitor your levels.  Low fat diet, reduce caffeine intake, and exercise at least 30 minutes daily.      Diagnosis: GERD (gastroesophageal reflux disease)  Assessment and Plan of Treatment: Continue over-the-counter/prescribed acid-reducing agents and avoid acidic/spicy foods in order to decrease your symptom frequency. Optimize your weight with proper diet and exercise. Follow-up with your primary care provider for further management.      Diagnosis: Acute lower UTI (urinary tract infection)  Assessment and Plan of Treatment: You completed antibiotic course. Monitor for signs/symptoms of infection, such as, fever/chills, burning/pain with urination, urinary frequency/hesitancy, cloudy urine, or blood in urine. Please follow up with your primary care physician regarding your hospitalization      Diagnosis: HARIS on CPAP  Assessment and Plan of Treatment: Continue CPAP at bedtime on discharge    Diagnosis: Atypical chest pain  Assessment and Plan of Treatment: Your EKG and cardia enzymes were unremarkable. This pain is likely musculoskeletal. Please follow up with your primary care doctor for further management and a referral for orthopedic/physical therapy outpatient.    Diagnosis: HLD (hyperlipidemia)  Assessment and Plan of Treatment: Continue cholesterol control medications. Continue DASH diet. Follow up with your PCP within 1 week of discharge for further management and monitoring of lipid and cholesterol panels. Please call to make an appointment      Diagnosis: Memory loss  Assessment and Plan of Treatment: Please follow up with your neurologist for further monitoring.     PRINCIPAL DISCHARGE DIAGNOSIS  Diagnosis: Vertigo  Assessment and Plan of Treatment: You had imaging done to determine the cause of your dizziness.  -CT Brain: No acute intracranial hemorrhage, mass effect, or evidence of acute vascular territorial infarct.   -CT Angio scan of your Head and Neck - without evidence of dissection,  mild right and mild to moderate left carotid stenoses at the origins of the internal carotid arteries  Your orthostatic blood pressure was negative.  You had an echocardiogram that revealed preserved heart function. Neurology was consulted.   - MRI head, IAC w and w/o contrast showed no acute infarct, hemorrhage, or mass, Chiari I malformation is suggested.  Neurology stated no further inpatient workup was required and you were referred for Vestibular Rehab on discharge.  You were started on a statin. Please continue to take this and follow up with your outpatient neurologist.  You were also started on Meclizine for Vertigo. Continue this medication and follow up with your neurologist for further management.  Please also follow up with your primary care physician regarding your hospitalization and for further monitoring/management.        SECONDARY DISCHARGE DIAGNOSES  Diagnosis: HTN (hypertension)  Assessment and Plan of Treatment: Continue blood pressure medication regimen as directed. Monitor for any visual changes, headaches or dizziness.  Monitor blood pressure regularly.  Follow up with your PCP for further management for high blood pressure.      Diagnosis: Atrial fibrillation  Assessment and Plan of Treatment: Continue Apixaban.  Follow up with cardiologist within one week of discharge. Call for appointment.  Continue to take your blood thinner as prescribed and follow with your physician to monitor your levels.  Low fat diet, reduce caffeine intake, and exercise at least 30 minutes daily.      Diagnosis: GERD (gastroesophageal reflux disease)  Assessment and Plan of Treatment: Continue over-the-counter/prescribed acid-reducing agents and avoid acidic/spicy foods in order to decrease your symptom frequency. Optimize your weight with proper diet and exercise. Follow-up with your primary care provider for further management.      Diagnosis: Acute lower UTI (urinary tract infection)  Assessment and Plan of Treatment: You completed antibiotic course. Monitor for signs/symptoms of infection, such as, fever/chills, burning/pain with urination, urinary frequency/hesitancy, cloudy urine, or blood in urine. Please follow up with your primary care physician regarding your hospitalization      Diagnosis: HARIS on CPAP  Assessment and Plan of Treatment: Continue CPAP at bedtime on discharge    Diagnosis: Atypical chest pain  Assessment and Plan of Treatment: Your EKG and cardia enzymes were unremarkable. This pain is likely musculoskeletal. Please follow up with your primary care doctor for further management and a referral for orthopedic/physical therapy outpatient.    Diagnosis: HLD (hyperlipidemia)  Assessment and Plan of Treatment: Continue cholesterol control medications. Continue DASH diet. Follow up with your PCP within 1 week of discharge for further management and monitoring of lipid and cholesterol panels. Please call to make an appointment      Diagnosis: Memory loss  Assessment and Plan of Treatment: Please follow up with your neurologist for further monitoring.

## 2022-08-26 NOTE — DISCHARGE NOTE PROVIDER - NSDCFUSCHEDAPPT_GEN_ALL_CORE_FT
Earnest Corley  [facility]  E[address]ROPH [phone]  Scheduled Appointment: 10/24/2022    Trinity Maldonado  [facility]  [address] [place] [address]  Scheduled Appointment: 10/26/2022

## 2022-08-26 NOTE — DISCHARGE NOTE PROVIDER - NSDCFUADDAPPT_GEN_ALL_CORE_FT
Please follow up with your primary care physician regarding your hospitalization and for further monitoring/management.    Please follow up with your neurologist outpatient.

## 2022-08-26 NOTE — DISCHARGE NOTE PROVIDER - CARE PROVIDER_API CALL
Your, Neurologist  Phone: (   )    -  Fax: (   )    -  Follow Up Time: 2 weeks    Your, Primary Care Doctor  Phone: (   )    -  Fax: (   )    -  Follow Up Time: 2 weeks

## 2022-08-26 NOTE — PROGRESS NOTE ADULT - PROBLEM SELECTOR PLAN 1
[place] secondary to peripheral vertigo  -CT Brain: No acute intracranial hemorrhage, mass effect, or evidence of acute vascular territorial infarct.   -CTA H&N - without evidence of dissection, LVO; showing mild right and mild to moderate left stenoses at the origins of the internal carotid arteries  -Orthostatics negative   -TTE:  Mild diastolic dysfunction, with normal LVEF, negative bubble   -Neurology consult appreciated   - MRI head, IAC w and w/o contrast- No acute infarct, hemorrhage, or mass, Chiari I malformation is suggested.  - c/w meclizine 12.5 BID PRN, If refractory to meclizine can try clonazepam   - refer for Vestibular Rehab on discharge  - no events on telemetry, can d/c   - Elevate head of the bed to 30 degree  -  PT/OT consult -> subacute rehab [place] secondary to peripheral vertigo  -CT Brain: No acute intracranial hemorrhage, mass effect, or evidence of acute vascular territorial infarct.   -CTA H&N - without evidence of dissection, LVO; showing mild right and mild to moderate left stenoses at the origins of the internal carotid arteries  -Orthostatics negative   -TTE:  Mild diastolic dysfunction, with normal LVEF, negative bubble   - Neurology consult appreciated, no further inpatient workup required  - MRI head, IAC w and w/o contrast- No acute infarct, hemorrhage, or mass, Chiari I malformation is suggested.  - c/w meclizine 12.5 BID PRN, If refractory to meclizine can try clonazepam   - refer for Vestibular Rehab on discharge  - no events on telemetry, can d/c   -  PT/OT consult -> subacute rehab

## 2022-08-27 DIAGNOSIS — E78.5 HYPERLIPIDEMIA, UNSPECIFIED: ICD-10-CM

## 2022-08-27 LAB
ANION GAP SERPL CALC-SCNC: 12 MMOL/L — SIGNIFICANT CHANGE UP (ref 7–14)
BUN SERPL-MCNC: 13 MG/DL — SIGNIFICANT CHANGE UP (ref 7–23)
CALCIUM SERPL-MCNC: 9 MG/DL — SIGNIFICANT CHANGE UP (ref 8.4–10.5)
CHLORIDE SERPL-SCNC: 96 MMOL/L — LOW (ref 98–107)
CO2 SERPL-SCNC: 24 MMOL/L — SIGNIFICANT CHANGE UP (ref 22–31)
CREAT SERPL-MCNC: 0.63 MG/DL — SIGNIFICANT CHANGE UP (ref 0.5–1.3)
EGFR: 94 ML/MIN/1.73M2 — SIGNIFICANT CHANGE UP
GLUCOSE SERPL-MCNC: 113 MG/DL — HIGH (ref 70–99)
MAGNESIUM SERPL-MCNC: 1.8 MG/DL — SIGNIFICANT CHANGE UP (ref 1.6–2.6)
PHOSPHATE SERPL-MCNC: 3.5 MG/DL — SIGNIFICANT CHANGE UP (ref 2.5–4.5)
POTASSIUM SERPL-MCNC: 3.7 MMOL/L — SIGNIFICANT CHANGE UP (ref 3.5–5.3)
POTASSIUM SERPL-SCNC: 3.7 MMOL/L — SIGNIFICANT CHANGE UP (ref 3.5–5.3)
SODIUM SERPL-SCNC: 132 MMOL/L — LOW (ref 135–145)

## 2022-08-27 PROCEDURE — 99232 SBSQ HOSP IP/OBS MODERATE 35: CPT

## 2022-08-27 RX ORDER — DIPHENHYDRAMINE HCL 50 MG
25 CAPSULE ORAL ONCE
Refills: 0 | Status: COMPLETED | OUTPATIENT
Start: 2022-08-27 | End: 2022-08-27

## 2022-08-27 RX ADMIN — POLYETHYLENE GLYCOL 3350 17 GRAM(S): 17 POWDER, FOR SOLUTION ORAL at 11:39

## 2022-08-27 RX ADMIN — Medication 12.5 MILLIGRAM(S): at 05:35

## 2022-08-27 RX ADMIN — Medication 300 MILLIGRAM(S): at 05:23

## 2022-08-27 RX ADMIN — Medication 1 GRAM(S): at 23:56

## 2022-08-27 RX ADMIN — Medication 3 MILLIGRAM(S): at 21:46

## 2022-08-27 RX ADMIN — Medication 1 GRAM(S): at 11:39

## 2022-08-27 RX ADMIN — PANTOPRAZOLE SODIUM 40 MILLIGRAM(S): 20 TABLET, DELAYED RELEASE ORAL at 05:24

## 2022-08-27 RX ADMIN — APIXABAN 5 MILLIGRAM(S): 2.5 TABLET, FILM COATED ORAL at 05:23

## 2022-08-27 RX ADMIN — Medication 1 GRAM(S): at 05:24

## 2022-08-27 RX ADMIN — Medication 25 MILLIGRAM(S): at 23:56

## 2022-08-27 RX ADMIN — SENNA PLUS 2 TABLET(S): 8.6 TABLET ORAL at 21:49

## 2022-08-27 RX ADMIN — LOSARTAN POTASSIUM 100 MILLIGRAM(S): 100 TABLET, FILM COATED ORAL at 05:23

## 2022-08-27 RX ADMIN — Medication 1 TABLET(S): at 11:39

## 2022-08-27 RX ADMIN — ROSUVASTATIN CALCIUM 10 MILLIGRAM(S): 5 TABLET ORAL at 21:46

## 2022-08-27 RX ADMIN — Medication 15 MILLIGRAM(S): at 11:40

## 2022-08-27 RX ADMIN — Medication 12.5 MILLIGRAM(S): at 10:57

## 2022-08-27 RX ADMIN — APIXABAN 5 MILLIGRAM(S): 2.5 TABLET, FILM COATED ORAL at 18:17

## 2022-08-27 RX ADMIN — Medication 1 GRAM(S): at 18:17

## 2022-08-27 NOTE — PROGRESS NOTE ADULT - PROBLEM SELECTOR PLAN 3
Patient with history of Afib on Eliquis- S/P Ablation on 3/2022  continue with Eliquis at home dose of 5 BID

## 2022-08-27 NOTE — PROGRESS NOTE ADULT - PROBLEM SELECTOR PLAN 4
Patient with atypical chest pain, reproducible with movement of her left shoulder, likely MSK pain   Serial EKGs - no acute ischemic changes and cardiac enzymes - troponin 7  TTE with preserved EF and without evidence of WMA   Monitor on telemetry  left shoulder without fracture and unchanged prior studies --> preserved joint spaces, and suggestive of possible rotator cuff injury   OP follow up with ortho/PT

## 2022-08-27 NOTE — PROGRESS NOTE ADULT - PROBLEM SELECTOR PLAN 1
[place] secondary to peripheral vertigo  -CT Brain: No acute intracranial hemorrhage, mass effect, or evidence of acute vascular territorial infarct.   -CTA H&N - without evidence of dissection, LVO; showing mild right and mild to moderate left stenoses at the origins of the internal carotid arteries  -Orthostatics negative   -TTE:  Mild diastolic dysfunction, with normal LVEF, negative bubble   - Neurology consult appreciated, no further inpatient workup required  - MRI head, IAC w and w/o contrast- No acute infarct, hemorrhage, or mass, Chiari I malformation is suggested.  - c/w meclizine 12.5 BID PRN, If refractory to meclizine can try clonazepam   - refer for Vestibular Rehab on discharge  - no events on telemetry, can d/c   -  PT/OT consult -> subacute rehab

## 2022-08-28 LAB
ANION GAP SERPL CALC-SCNC: 11 MMOL/L — SIGNIFICANT CHANGE UP (ref 7–14)
BUN SERPL-MCNC: 10 MG/DL — SIGNIFICANT CHANGE UP (ref 7–23)
CALCIUM SERPL-MCNC: 9 MG/DL — SIGNIFICANT CHANGE UP (ref 8.4–10.5)
CHLORIDE SERPL-SCNC: 99 MMOL/L — SIGNIFICANT CHANGE UP (ref 98–107)
CO2 SERPL-SCNC: 24 MMOL/L — SIGNIFICANT CHANGE UP (ref 22–31)
CREAT SERPL-MCNC: 0.65 MG/DL — SIGNIFICANT CHANGE UP (ref 0.5–1.3)
CULTURE RESULTS: SIGNIFICANT CHANGE UP
EGFR: 93 ML/MIN/1.73M2 — SIGNIFICANT CHANGE UP
GLUCOSE SERPL-MCNC: 107 MG/DL — HIGH (ref 70–99)
MAGNESIUM SERPL-MCNC: 1.9 MG/DL — SIGNIFICANT CHANGE UP (ref 1.6–2.6)
PHOSPHATE SERPL-MCNC: 3.3 MG/DL — SIGNIFICANT CHANGE UP (ref 2.5–4.5)
POTASSIUM SERPL-MCNC: 3.4 MMOL/L — LOW (ref 3.5–5.3)
POTASSIUM SERPL-SCNC: 3.4 MMOL/L — LOW (ref 3.5–5.3)
SARS-COV-2 RNA SPEC QL NAA+PROBE: SIGNIFICANT CHANGE UP
SODIUM SERPL-SCNC: 134 MMOL/L — LOW (ref 135–145)
SPECIMEN SOURCE: SIGNIFICANT CHANGE UP

## 2022-08-28 PROCEDURE — 99232 SBSQ HOSP IP/OBS MODERATE 35: CPT

## 2022-08-28 RX ORDER — POTASSIUM CHLORIDE 20 MEQ
20 PACKET (EA) ORAL
Refills: 0 | Status: COMPLETED | OUTPATIENT
Start: 2022-08-28 | End: 2022-08-28

## 2022-08-28 RX ADMIN — SENNA PLUS 2 TABLET(S): 8.6 TABLET ORAL at 21:19

## 2022-08-28 RX ADMIN — Medication 1 TABLET(S): at 11:37

## 2022-08-28 RX ADMIN — Medication 1 GRAM(S): at 17:14

## 2022-08-28 RX ADMIN — Medication 15 MILLIGRAM(S): at 11:37

## 2022-08-28 RX ADMIN — Medication 1 GRAM(S): at 11:36

## 2022-08-28 RX ADMIN — Medication 20 MILLIEQUIVALENT(S): at 12:57

## 2022-08-28 RX ADMIN — POLYETHYLENE GLYCOL 3350 17 GRAM(S): 17 POWDER, FOR SOLUTION ORAL at 11:38

## 2022-08-28 RX ADMIN — Medication 12.5 MILLIGRAM(S): at 05:11

## 2022-08-28 RX ADMIN — Medication 20 MILLIEQUIVALENT(S): at 11:36

## 2022-08-28 RX ADMIN — Medication 1 GRAM(S): at 05:11

## 2022-08-28 RX ADMIN — ROSUVASTATIN CALCIUM 10 MILLIGRAM(S): 5 TABLET ORAL at 21:19

## 2022-08-28 RX ADMIN — APIXABAN 5 MILLIGRAM(S): 2.5 TABLET, FILM COATED ORAL at 17:14

## 2022-08-28 RX ADMIN — APIXABAN 5 MILLIGRAM(S): 2.5 TABLET, FILM COATED ORAL at 05:12

## 2022-08-28 RX ADMIN — PANTOPRAZOLE SODIUM 40 MILLIGRAM(S): 20 TABLET, DELAYED RELEASE ORAL at 05:12

## 2022-08-28 RX ADMIN — Medication 3 MILLIGRAM(S): at 21:19

## 2022-08-28 RX ADMIN — LOSARTAN POTASSIUM 100 MILLIGRAM(S): 100 TABLET, FILM COATED ORAL at 05:12

## 2022-08-28 RX ADMIN — Medication 20 MILLIEQUIVALENT(S): at 11:51

## 2022-08-28 NOTE — PROGRESS NOTE ADULT - SUBJECTIVE AND OBJECTIVE BOX
Patient is a 72y old  Female who presents with a chief complaint of Dizziness and unsteady gait (27 Aug 2022 17:59)    Arpan Grewal MD   [facility]   Pager [phone]  Reachable on Microsoft Teams     SUBJECTIVE / OVERNIGHT EVENTS:  Patient seen and examined today.    MEDICATIONS  (STANDING):  apixaban 5 milliGRAM(s) Oral every 12 hours  diltiazem    milliGRAM(s) Oral daily  hydrochlorothiazide 12.5 milliGRAM(s) Oral daily  losartan 100 milliGRAM(s) Oral daily  melatonin 3 milliGRAM(s) Oral at bedtime  multivitamin 1 Tablet(s) Oral daily  oxybutynin 15 milliGRAM(s) Oral daily  pantoprazole    Tablet 40 milliGRAM(s) Oral before breakfast  polyethylene glycol 3350 17 Gram(s) Oral daily  rosuvastatin 10 milliGRAM(s) Oral at bedtime  senna 2 Tablet(s) Oral at bedtime  sucralfate 1 Gram(s) Oral every 6 hours    MEDICATIONS  (PRN):  acetaminophen     Tablet .. 650 milliGRAM(s) Oral every 6 hours PRN Temp greater or equal to 38C (100.4F), Mild Pain (1 - 3), Moderate Pain (4 - 6)  meclizine 12.5 milliGRAM(s) Oral two times a day PRN Dizziness      Vital Signs Last 24 Hrs  T(C): 36.8 (28 Aug 2022 12:05), Max: 36.8 (28 Aug 2022 12:05)  T(F): 98.2 (28 Aug 2022 12:05), Max: 98.2 (28 Aug 2022 12:05)  HR: 61 (28 Aug 2022 12:05) (54 - 85)  BP: 124/60 (28 Aug 2022 12:05) (118/60 - 126/69)  BP(mean): --  RR: 17 (28 Aug 2022 12:05) (17 - 18)  SpO2: 100% (28 Aug 2022 12:05) (98% - 100%)  CAPILLARY BLOOD GLUCOSE        I&O's Summary      General: NAD, awake and alert  Eyes: PERRLA, conjunctiva clear, nonicteric sclera  Respiratory: No respiratory distress, CTABL, No rales, rhonchi, wheezing.  Cardiovascular: S1,S2; Regular rate and rhythm; No m/g/r. 2+ peripheral pulses  Gastrointestinal: Soft, Nontender, Nondistended; +BS.   Extremities: No c/c/e; warm to touch. ROM limited on left shoulder due to pain  Psych: appropriate mood and affect    LABS:    08-28    134<L>  |  99  |  10  ----------------------------<  107<H>  3.4<L>   |  24  |  0.65    Ca    9.0      28 Aug 2022 07:16  Phos  3.3     08-28  Mg     1.90     08-28                RADIOLOGY & ADDITIONAL TESTS:    Imaging Personally Reviewed:    Consultant(s) Notes Reviewed:      Care Discussed with Consultants/Other Providers:   Patient is a 72y old  Female who presents with a chief complaint of Dizziness and unsteady gait (27 Aug 2022 17:59)    Arpan Grewal MD   [facility]   Pager [phone]  Reachable on Microsoft Teams     SUBJECTIVE / OVERNIGHT EVENTS:  Patient seen and examined today. Dizziness is improving, still reporting some vision changes.   No headaches, fevers, or chills.     MEDICATIONS  (STANDING):  apixaban 5 milliGRAM(s) Oral every 12 hours  diltiazem    milliGRAM(s) Oral daily  hydrochlorothiazide 12.5 milliGRAM(s) Oral daily  losartan 100 milliGRAM(s) Oral daily  melatonin 3 milliGRAM(s) Oral at bedtime  multivitamin 1 Tablet(s) Oral daily  oxybutynin 15 milliGRAM(s) Oral daily  pantoprazole    Tablet 40 milliGRAM(s) Oral before breakfast  polyethylene glycol 3350 17 Gram(s) Oral daily  rosuvastatin 10 milliGRAM(s) Oral at bedtime  senna 2 Tablet(s) Oral at bedtime  sucralfate 1 Gram(s) Oral every 6 hours    MEDICATIONS  (PRN):  acetaminophen     Tablet .. 650 milliGRAM(s) Oral every 6 hours PRN Temp greater or equal to 38C (100.4F), Mild Pain (1 - 3), Moderate Pain (4 - 6)  meclizine 12.5 milliGRAM(s) Oral two times a day PRN Dizziness      Vital Signs Last 24 Hrs  T(C): 36.8 (28 Aug 2022 12:05), Max: 36.8 (28 Aug 2022 12:05)  T(F): 98.2 (28 Aug 2022 12:05), Max: 98.2 (28 Aug 2022 12:05)  HR: 61 (28 Aug 2022 12:05) (54 - 85)  BP: 124/60 (28 Aug 2022 12:05) (118/60 - 126/69)  BP(mean): --  RR: 17 (28 Aug 2022 12:05) (17 - 18)  SpO2: 100% (28 Aug 2022 12:05) (98% - 100%)  CAPILLARY BLOOD GLUCOSE        I&O's Summary      General: NAD, awake and alert  Eyes: PERRLA, conjunctiva clear, nonicteric sclera  Respiratory: No respiratory distress, CTABL, No rales, rhonchi, wheezing.  Cardiovascular: S1,S2; Regular rate and rhythm; No m/g/r. 2+ peripheral pulses  Gastrointestinal: Soft, Nontender, Nondistended; +BS.   Extremities: No c/c/e; warm to touch. ROM limited on left shoulder due to pain  Psych: appropriate mood and affect    LABS:    08-28    134<L>  |  99  |  10  ----------------------------<  107<H>  3.4<L>   |  24  |  0.65    Ca    9.0      28 Aug 2022 07:16  Phos  3.3     08-28  Mg     1.90     08-28                RADIOLOGY & ADDITIONAL TESTS:    Imaging Personally Reviewed:    Consultant(s) Notes Reviewed:      Care Discussed with Consultants/Other Providers:

## 2022-08-28 NOTE — PROGRESS NOTE ADULT - PROBLEM SELECTOR PLAN 1
[place] secondary to peripheral vertigo  -CT Brain: No acute intracranial hemorrhage, mass effect, or evidence of acute vascular territorial infarct.   -CTA H&N - without evidence of dissection, LVO; showing mild right and mild to moderate left stenoses at the origins of the internal carotid arteries  -Orthostatics negative   -TTE:  Mild diastolic dysfunction, with normal LVEF, negative bubble   - Neurology consult appreciated, no further inpatient workup required  - MRI head, IAC w and w/o contrast- No acute infarct, hemorrhage, or mass, Chiari I malformation is suggested.  - c/w meclizine 12.5 BID PRN  - refer for Vestibular Rehab on discharge  - no events on telemetry, can d/c   -  PT/OT consult -> subacute rehab Likely secondary to peripheral vertigo  -CT Brain: No acute intracranial hemorrhage, mass effect, or evidence of acute vascular territorial infarct.   -CTA H&N - without evidence of dissection, LVO; showing mild right and mild to moderate left stenoses at the origins of the internal carotid arteries  -Orthostatics negative   -TTE:  Mild diastolic dysfunction, with normal LVEF, negative bubble   - Neurology consult appreciated, no further inpatient workup required  - MRI head, IAC w and w/o contrast- No acute infarct, hemorrhage, or mass, Chiari I malformation is suggested.  - c/w meclizine 12.5 BID PRN  - refer for Vestibular Rehab on discharge  -  PT/OT consult -> subacute rehab

## 2022-08-29 DIAGNOSIS — R41.3 OTHER AMNESIA: ICD-10-CM

## 2022-08-29 LAB
ANION GAP SERPL CALC-SCNC: 11 MMOL/L — SIGNIFICANT CHANGE UP (ref 7–14)
BUN SERPL-MCNC: 11 MG/DL — SIGNIFICANT CHANGE UP (ref 7–23)
CALCIUM SERPL-MCNC: 9.3 MG/DL — SIGNIFICANT CHANGE UP (ref 8.4–10.5)
CHLORIDE SERPL-SCNC: 98 MMOL/L — SIGNIFICANT CHANGE UP (ref 98–107)
CO2 SERPL-SCNC: 26 MMOL/L — SIGNIFICANT CHANGE UP (ref 22–31)
CREAT SERPL-MCNC: 0.65 MG/DL — SIGNIFICANT CHANGE UP (ref 0.5–1.3)
EGFR: 93 ML/MIN/1.73M2 — SIGNIFICANT CHANGE UP
GLUCOSE SERPL-MCNC: 100 MG/DL — HIGH (ref 70–99)
MAGNESIUM SERPL-MCNC: 1.9 MG/DL — SIGNIFICANT CHANGE UP (ref 1.6–2.6)
PHOSPHATE SERPL-MCNC: 3.2 MG/DL — SIGNIFICANT CHANGE UP (ref 2.5–4.5)
POTASSIUM SERPL-MCNC: 3.9 MMOL/L — SIGNIFICANT CHANGE UP (ref 3.5–5.3)
POTASSIUM SERPL-SCNC: 3.9 MMOL/L — SIGNIFICANT CHANGE UP (ref 3.5–5.3)
SODIUM SERPL-SCNC: 135 MMOL/L — SIGNIFICANT CHANGE UP (ref 135–145)

## 2022-08-29 PROCEDURE — 99232 SBSQ HOSP IP/OBS MODERATE 35: CPT

## 2022-08-29 RX ADMIN — APIXABAN 5 MILLIGRAM(S): 2.5 TABLET, FILM COATED ORAL at 05:39

## 2022-08-29 RX ADMIN — Medication 3 MILLIGRAM(S): at 23:02

## 2022-08-29 RX ADMIN — Medication 300 MILLIGRAM(S): at 05:39

## 2022-08-29 RX ADMIN — Medication 1 GRAM(S): at 23:02

## 2022-08-29 RX ADMIN — Medication 12.5 MILLIGRAM(S): at 05:39

## 2022-08-29 RX ADMIN — PANTOPRAZOLE SODIUM 40 MILLIGRAM(S): 20 TABLET, DELAYED RELEASE ORAL at 06:43

## 2022-08-29 RX ADMIN — Medication 1 GRAM(S): at 05:39

## 2022-08-29 RX ADMIN — POLYETHYLENE GLYCOL 3350 17 GRAM(S): 17 POWDER, FOR SOLUTION ORAL at 11:04

## 2022-08-29 RX ADMIN — Medication 1 GRAM(S): at 17:12

## 2022-08-29 RX ADMIN — APIXABAN 5 MILLIGRAM(S): 2.5 TABLET, FILM COATED ORAL at 17:12

## 2022-08-29 RX ADMIN — LOSARTAN POTASSIUM 100 MILLIGRAM(S): 100 TABLET, FILM COATED ORAL at 05:39

## 2022-08-29 RX ADMIN — ROSUVASTATIN CALCIUM 10 MILLIGRAM(S): 5 TABLET ORAL at 23:02

## 2022-08-29 RX ADMIN — Medication 1 TABLET(S): at 11:03

## 2022-08-29 RX ADMIN — Medication 15 MILLIGRAM(S): at 11:03

## 2022-08-29 RX ADMIN — Medication 12.5 MILLIGRAM(S): at 05:45

## 2022-08-29 RX ADMIN — SENNA PLUS 2 TABLET(S): 8.6 TABLET ORAL at 23:02

## 2022-08-29 RX ADMIN — Medication 1 GRAM(S): at 11:03

## 2022-08-29 RX ADMIN — Medication 1 GRAM(S): at 00:39

## 2022-08-29 NOTE — PROGRESS NOTE ADULT - PROBLEM SELECTOR PLAN 3
Patient with history of Afib on Eliquis- S/P Ablation on 3/2022  continue with Eliquis at home dose of 5 BID, cardizem

## 2022-08-29 NOTE — PROGRESS NOTE ADULT - PROBLEM SELECTOR PLAN 1
Likely secondary to peripheral vertigo  Workup with: CT Brain: No acute intracranial hemorrhage, mass effect, or evidence of acute vascular territorial infarct. CTA H&N - without evidence of dissection, LVO; showing mild right and mild to moderate left stenoses at the origins of the internal carotid arteries. Orthostatics negative, TTE:  Mild diastolic dysfunction, with normal LVEF, negative bubble. MRI head, IAC w and w/o contrast- No acute infarct, hemorrhage, or mass, Chiari I malformation is suggested.  - Neurology consult appreciated, no further inpatient workup required  - c/w meclizine 12.5 BID PRN  - Vestibular Rehab on discharge  -  PT/OT consult -> subacute rehab, potential discharge today

## 2022-08-29 NOTE — PROGRESS NOTE ADULT - SUBJECTIVE AND OBJECTIVE BOX
[facility] Division of Hospital Medicine  Yael Coy MD  Available via MS Teams  Pager: [phone]    SUBJECTIVE / OVERNIGHT EVENTS:  No events. This morning patient report dizziness is improving slowly. Report 1 year of memory loss, has an outpatient neurologist but will also like to follow up here. No other complaints.    ADDITIONAL REVIEW OF SYSTEMS:  REVIEW OF SYSTEMS:    CONSTITUTIONAL: No weakness, fevers or chills  EYES/ENT: No visual changes;  No vertigo or throat pain   NECK: No pain or stiffness  RESPIRATORY: No cough, wheezing, hemoptysis; No shortness of breath  CARDIOVASCULAR: No chest pain or palpitations  GASTROINTESTINAL: No abdominal or epigastric pain. No nausea, vomiting, or hematemesis; No diarrhea or constipation. No melena or hematochezia.  GENITOURINARY: No dysuria, frequency or hematuria  NEUROLOGICAL: No numbness or weakness  SKIN: No itching, rashes      MEDICATIONS  (STANDING):  apixaban 5 milliGRAM(s) Oral every 12 hours  diltiazem    milliGRAM(s) Oral daily  hydrochlorothiazide 12.5 milliGRAM(s) Oral daily  losartan 100 milliGRAM(s) Oral daily  melatonin 3 milliGRAM(s) Oral at bedtime  multivitamin 1 Tablet(s) Oral daily  oxybutynin 15 milliGRAM(s) Oral daily  pantoprazole    Tablet 40 milliGRAM(s) Oral before breakfast  polyethylene glycol 3350 17 Gram(s) Oral daily  rosuvastatin 10 milliGRAM(s) Oral at bedtime  senna 2 Tablet(s) Oral at bedtime  sucralfate 1 Gram(s) Oral every 6 hours    MEDICATIONS  (PRN):  acetaminophen     Tablet .. 650 milliGRAM(s) Oral every 6 hours PRN Temp greater or equal to 38C (100.4F), Mild Pain (1 - 3), Moderate Pain (4 - 6)  meclizine 12.5 milliGRAM(s) Oral two times a day PRN Dizziness      I&O's Summary      PHYSICAL EXAM:  Vital Signs Last 24 Hrs  T(C): 36.5 (29 Aug 2022 06:44), Max: 36.8 (28 Aug 2022 12:05)  T(F): 97.7 (29 Aug 2022 06:44), Max: 98.2 (28 Aug 2022 12:05)  HR: 86 (29 Aug 2022 06:44) (60 - 86)  BP: 154/62 (29 Aug 2022 06:44) (124/60 - 154/62)  BP(mean): --  RR: 17 (29 Aug 2022 06:44) (17 - 18)  SpO2: 99% (29 Aug 2022 06:44) (97% - 100%)    Parameters below as of 29 Aug 2022 06:44  Patient On (Oxygen Delivery Method): room air      CONSTITUTIONAL: NAD, well-developed, well-groomed  EYES: PERRLA; conjunctiva and sclera clear  ENMT: Moist oral mucosa  RESPIRATORY: Normal respiratory effort; lungs are clear to auscultation bilaterally  CARDIOVASCULAR: Regular rate and rhythm, normal S1 and S2, No lower extremity edema; Peripheral pulses are 2+ bilaterally  ABDOMEN: Nontender to palpation, normoactive bowel sounds, no rebound/guarding  MUSCULOSKELETAL:  full ROM on all extremities; no clubbing or cyanosis of digits; no joint swelling or tenderness to palpation  PSYCH: A+O to person, place, and time; affect appropriate  NEUROLOGY: CN 2-12 are intact and symmetric; no gross sensory deficits   SKIN: No rashes; no palpable lesions    LABS:    08-29    135  |  98  |  11  ----------------------------<  100<H>  3.9   |  26  |  0.65    Ca    9.3      29 Aug 2022 06:16  Phos  3.2     08-29  Mg     1.90     08-29                COVID-19 PCR: NotDetec (28 Aug 2022 07:42)      RADIOLOGY & ADDITIONAL TESTS:  New Results Reviewed Today: no new results    COMMUNICATION:  Care Discussed with Consultants/Other Providers and Details of Discussion: PA  Discussions with Patient/Family: discussed with patient at bedside, plan for ZIA today

## 2022-08-29 NOTE — PROGRESS NOTE ADULT - PROBLEM SELECTOR PLAN 9
1 year of memory loss per patient, sometimes difficulty 1 year of memory loss per patient, sometimes difficulty remembering phone numbers and names  -OP follow with neurology

## 2022-08-29 NOTE — PROGRESS NOTE ADULT - PROBLEM SELECTOR PLAN 4
Patient with atypical chest pain, reproducible with movement of her left shoulder, likely MSK pain   Serial EKGs - no acute ischemic changes and cardiac enzymes - troponin 7  TTE with preserved EF and without evidence of WMA   left shoulder without fracture and unchanged prior studies --> preserved joint spaces, and suggestive of possible rotator cuff injury   -resolved  OP follow up with ortho/PT

## 2022-08-30 ENCOUNTER — TRANSCRIPTION ENCOUNTER (OUTPATIENT)
Age: 72
End: 2022-08-30

## 2022-08-30 VITALS — HEART RATE: 77 BPM | OXYGEN SATURATION: 95 %

## 2022-08-30 PROCEDURE — 99239 HOSP IP/OBS DSCHRG MGMT >30: CPT

## 2022-08-30 RX ORDER — MECLIZINE HCL 12.5 MG
1 TABLET ORAL
Qty: 60 | Refills: 0
Start: 2022-08-30 | End: 2022-09-28

## 2022-08-30 RX ORDER — ROSUVASTATIN CALCIUM 5 MG/1
1 TABLET ORAL
Qty: 30 | Refills: 0
Start: 2022-08-30 | End: 2022-09-28

## 2022-08-30 RX ADMIN — POLYETHYLENE GLYCOL 3350 17 GRAM(S): 17 POWDER, FOR SOLUTION ORAL at 12:10

## 2022-08-30 RX ADMIN — APIXABAN 5 MILLIGRAM(S): 2.5 TABLET, FILM COATED ORAL at 06:16

## 2022-08-30 RX ADMIN — Medication 1 TABLET(S): at 12:09

## 2022-08-30 RX ADMIN — Medication 12.5 MILLIGRAM(S): at 06:16

## 2022-08-30 RX ADMIN — Medication 1 GRAM(S): at 12:09

## 2022-08-30 RX ADMIN — LOSARTAN POTASSIUM 100 MILLIGRAM(S): 100 TABLET, FILM COATED ORAL at 06:15

## 2022-08-30 RX ADMIN — Medication 1 GRAM(S): at 06:16

## 2022-08-30 RX ADMIN — Medication 15 MILLIGRAM(S): at 12:09

## 2022-08-30 RX ADMIN — PANTOPRAZOLE SODIUM 40 MILLIGRAM(S): 20 TABLET, DELAYED RELEASE ORAL at 06:16

## 2022-08-30 RX ADMIN — Medication 300 MILLIGRAM(S): at 06:16

## 2022-08-30 NOTE — PROGRESS NOTE ADULT - SUBJECTIVE AND OBJECTIVE BOX
[facility] Division of Hospital Medicine  Yael Coy MD  Available via MS Teams  Pager: [phone]    SUBJECTIVE / OVERNIGHT EVENTS:  No overnight events. She feels well, walked to the bathroom and had a shower this morning. Dizziness improving, no complaints.     ADDITIONAL REVIEW OF SYSTEMS:  REVIEW OF SYSTEMS:    CONSTITUTIONAL: No weakness, fevers or chills  EYES/ENT: No visual changes   NECK: No pain or stiffness  RESPIRATORY: No cough, wheezing, hemoptysis; No shortness of breath  CARDIOVASCULAR: No chest pain or palpitations  GASTROINTESTINAL: No abdominal or epigastric pain. No nausea, vomiting, or hematemesis; No diarrhea or constipation. No melena or hematochezia.  GENITOURINARY: No dysuria, frequency or hematuria  NEUROLOGICAL: No numbness or weakness  SKIN: No itching, rashes      MEDICATIONS  (STANDING):  apixaban 5 milliGRAM(s) Oral every 12 hours  diltiazem    milliGRAM(s) Oral daily  hydrochlorothiazide 12.5 milliGRAM(s) Oral daily  losartan 100 milliGRAM(s) Oral daily  melatonin 3 milliGRAM(s) Oral at bedtime  multivitamin 1 Tablet(s) Oral daily  oxybutynin 15 milliGRAM(s) Oral daily  pantoprazole    Tablet 40 milliGRAM(s) Oral before breakfast  polyethylene glycol 3350 17 Gram(s) Oral daily  rosuvastatin 10 milliGRAM(s) Oral at bedtime  senna 2 Tablet(s) Oral at bedtime  sucralfate 1 Gram(s) Oral every 6 hours    MEDICATIONS  (PRN):  acetaminophen     Tablet .. 650 milliGRAM(s) Oral every 6 hours PRN Temp greater or equal to 38C (100.4F), Mild Pain (1 - 3), Moderate Pain (4 - 6)  meclizine 12.5 milliGRAM(s) Oral two times a day PRN Dizziness      I&O's Summary      PHYSICAL EXAM:  Vital Signs Last 24 Hrs  T(C): 36.5 (30 Aug 2022 11:45), Max: 36.8 (29 Aug 2022 23:02)  T(F): 97.7 (30 Aug 2022 11:45), Max: 98.3 (29 Aug 2022 23:02)  HR: 58 (30 Aug 2022 11:45) (58 - 76)  BP: 129/74 (30 Aug 2022 11:45) (129/74 - 147/62)  BP(mean): --  RR: 17 (30 Aug 2022 11:45) (16 - 17)  SpO2: 98% (30 Aug 2022 11:45) (96% - 99%)    Parameters below as of 30 Aug 2022 11:45  Patient On (Oxygen Delivery Method): room air      CONSTITUTIONAL: NAD, well-developed, well-groomed  EYES: PERRLA; conjunctiva and sclera clear  RESPIRATORY: Normal respiratory effort; lungs are clear to auscultation bilaterally  CARDIOVASCULAR: Regular rate and rhythm, normal S1 and S2; No lower extremity edema; Peripheral pulses are 2+ bilaterally  ABDOMEN: Nontender to palpation, normoactive bowel sounds, no rebound/guarding  MUSCULOSKELETAL:  Normal gait; no clubbing or cyanosis of digits; no joint swelling or tenderness to palpation  PSYCH: A+O to person, place, and time; affect appropriate  NEUROLOGY: CN 2-12 are intact and symmetric; no gross sensory deficits   SKIN: No rashes; no palpable lesions    LABS:    08-29    135  |  98  |  11  ----------------------------<  100<H>  3.9   |  26  |  0.65    Ca    9.3      29 Aug 2022 06:16  Phos  3.2     08-29  Mg     1.90     08-29                COVID-19 PCR: NotDetec (28 Aug 2022 07:42)      RADIOLOGY & ADDITIONAL TESTS:  no new results    COMMUNICATION:  Care Discussed with Consultants/Other Providers and Details of Discussion: PA, ADRIA

## 2022-08-30 NOTE — PROGRESS NOTE ADULT - ASSESSMENT
73 y/o female Confucianism with PMH of HTN, multiple prior CVAs (pt reports asymptomatic), Atrial fibrillation diagnosed on 7/2021 s/p ablation 3/2022 on Eliquis, HARIS w/ CPAP, right knee replacement 2017, presents to ED c/o dizziness and difficulty walking. Patient admitted for management of vertigo with possible stroke. 
71 y/o female Zoroastrianism with PMH of HTN, multiple prior CVAs (pt reports asymptomatic), Atrial fibrillation diagnosed on 7/2021 s/p ablation 3/2022 on Eliquis, HARIS w/ CPAP, right knee replacement 2017, presents to ED c/o dizziness and difficulty walking. Patient admitted for management of vertigo with possible stroke. 
73 y/o female Buddhist with PMH of HTN, multiple prior CVAs (pt reports asymptomatic), Atrial fibrillation diagnosed on 7/2021 s/p ablation 3/2022 on Eliquis, HARIS w/ CPAP, right knee replacement 2017, presents to ED c/o dizziness and difficulty walking. Patient admitted for management of vertigo with possible stroke. 
71 y/o female Christian with PMH of HTN, multiple prior CVAs (pt reports asymptomatic), Atrial fibrillation diagnosed on 7/2021 s/p ablation 3/2022 on Eliquis, HARIS w/ CPAP, right knee replacement 2017, presents to ED c/o dizziness and difficulty walking. MRI without evident of CVA, been managed for peripheral vertigo. Seen by PT who recommended ZIA, awaiting ZIA placement
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 Patient admitted for management of vertigo with possible stroke. 
73 y/o female Denominational with PMH of HTN, multiple prior CVAs (pt reports asymptomatic), Atrial fibrillation diagnosed on 7/2021 s/p ablation 3/2022 on Eliquis, HARIS w/ CPAP, right knee replacement 2017, presents to ED c/o dizziness and difficulty walking. Patient admitted for management of vertigo with possible stroke. 
73 y/o female Presybeterian with PMH of HTN, multiple prior CVAs (pt reports asymptomatic), Atrial fibrillation diagnosed on 7/2021 s/p ablation 3/2022 on Eliquis, HARIS w/ CPAP, right knee replacement 2017, presents to ED c/o dizziness and difficulty walking. MRI without evident of CVA, been managed for peripheral vertigo. Seen by PT who recommended ZIA, however denied by insurance and patient would prefer to go home.

## 2022-08-30 NOTE — PROGRESS NOTE ADULT - PROBLEM SELECTOR PLAN 7
c/w CPAP at home settings
not tolerating atorvastatin   c/w home crestor
not tolerating atorvastatin   c/w home crestor
c/w CPAP at home settings
c/w CPAP at home settings
not tolerating atorvastatin   c/w home crestor
not tolerating atorvastatin   c/w home crestor

## 2022-08-30 NOTE — PROGRESS NOTE ADULT - PROBLEM SELECTOR PROBLEM 4
Atypical chest pain

## 2022-08-30 NOTE — PROGRESS NOTE ADULT - PROBLEM SELECTOR PLAN 5
- HTN -  Stable  - DASH diet  - c/w home meds w/ holding parameters  - monitor BP & titrate BP meds PRN.
- HTN -  Stable  - DASH diet  - c/w home meds w/ holding parameters  - monitor BP & titrate BP meds PRN.
- HTN -  Stable  - DASH diet  - c/w home meds w/ holding parameters
- HTN -  Stable  - DASH diet  - c/w home meds w/ holding parameters  - monitor BP & titrate BP meds PRN.
- HTN -  Stable  - DASH diet  - c/w home meds w/ holding parameters

## 2022-08-30 NOTE — DISCHARGE NOTE NURSING/CASE MANAGEMENT/SOCIAL WORK - NSDCPEFALRISK_GEN_ALL_CORE
For information on Fall & Injury Prevention, visit: [email or web address] OR  [email or web address] OR  https://www.cdc.gov/steadi/patient.html

## 2022-08-30 NOTE — PROGRESS NOTE ADULT - PROBLEM SELECTOR PROBLEM 8
HARIS on CPAP
Preventive measure
HARIS on CPAP
Preventive measure
Preventive measure

## 2022-08-30 NOTE — PROGRESS NOTE ADULT - PROBLEM SELECTOR PLAN 10
DVT prophylaxis - Patient on Eliquis 5 mg BID for Afib.  Diet: DASH  Dispo: home today    Discharge today, I personally expend 35 minutes in the discharge planning.
DVT prophylaxis - Patient on Eliquis 5 mg BID for Afib.  Diet: DASH  Dispo: PT-> ZIA    Discharge today, I personally expend 35 minutes in the discharge planning.

## 2022-08-30 NOTE — DISCHARGE NOTE NURSING/CASE MANAGEMENT/SOCIAL WORK - PATIENT PORTAL LINK FT
You can access the FollowMyHealth Patient Portal offered by [facility] by registering at the following website: [email or web address]. By joining [employer]’s FollowMyHealth portal, you will also be able to view your health information using other applications (apps) compatible with our system.

## 2022-08-30 NOTE — PROGRESS NOTE ADULT - PROBLEM SELECTOR PLAN 2
increased urinary frequency for the past few days,   UA noted for occasional bacteria, leukocytesterase , however no pyuria   UCx with GPCs, f/u sensitivities   Will rx with 3 days of CTX.
increased urinary frequency for the past few days,   UA noted for occasional bacteria, leukocytesterase , however no pyuria   UCx with GP organisms, f/u sensitivities   c/w ceftriaxone, likely 3 day course.
increased urinary frequency for the past few days,   UA noted for occasional bacteria, leukocytesterase , however no pyuria   UCx with GP organisms, f/u sensitivities   s/p ceftriaxone for 3 day course.
increased urinary frequency for the past few days prior to admission  UA noted for occasional bacteria, leukocytesterase. UCx with GP organisms, sensitivities not completed   Completed ceftriaxone 3 day course.  -resolved
increased urinary frequency for the past few days,   UA noted for occasional bacteria, leukocytesterase , however no pyuria   UCx with GP organisms, sensitivities not completed   s/p ceftriaxone for 3 day course.
increased urinary frequency for the past few days prior to admission  UA noted for occasional bacteria, leukocytesterase. UCx with GP organisms, sensitivities not completed   Completed ceftriaxone 3 day course.  -resolved
increased urinary frequency for the past few days,   UA noted for occasional bacteria, leukocytesterase , however no pyuria   UCx with GP organisms, sensitivities not completed   s/p ceftriaxone for 3 day course.

## 2022-08-30 NOTE — PROGRESS NOTE ADULT - REASON FOR ADMISSION
Dizziness and unsteady gait

## 2022-08-30 NOTE — DIETITIAN INITIAL EVALUATION ADULT - OTHER INFO
71 y/o female Islam with PMH of HTN, multiple prior CVAs (pt reports asymptomatic), Atrial fibrillation diagnosed on 7/2021 s/p ablation 3/2022 on Eliquis, HARIS w/ CPAP, right knee replacement 2017, presents to ED c/o dizziness and difficulty walking. MRI without evident of CVA, been managed for peripheral vertigo. Seen by PT who recommended ZIA, awaiting ZIA placement    Pt reports 75% intake of meals with No GI distress (nausea/vomiting/diarrhea/constipation.) at present. Pt with intentional wt. loss of 5# in past 2 months by cutting portions and watching diet. Skin ecchymosis, no edema as per nursing flow sheet.

## 2022-08-30 NOTE — PROGRESS NOTE ADULT - PROBLEM SELECTOR PLAN 9
1 year of memory loss per patient, sometimes difficulty remembering phone numbers and names  -OP follow with neurology

## 2022-08-30 NOTE — PROGRESS NOTE ADULT - PROBLEM SELECTOR PROBLEM 2
Acute lower UTI (urinary tract infection)

## 2022-08-30 NOTE — DIETITIAN INITIAL EVALUATION ADULT - PERTINENT MEDS FT
MEDICATIONS  (STANDING):  apixaban 5 milliGRAM(s) Oral every 12 hours  diltiazem    milliGRAM(s) Oral daily  hydrochlorothiazide 12.5 milliGRAM(s) Oral daily  losartan 100 milliGRAM(s) Oral daily  melatonin 3 milliGRAM(s) Oral at bedtime  multivitamin 1 Tablet(s) Oral daily  oxybutynin 15 milliGRAM(s) Oral daily  pantoprazole    Tablet 40 milliGRAM(s) Oral before breakfast  polyethylene glycol 3350 17 Gram(s) Oral daily  rosuvastatin 10 milliGRAM(s) Oral at bedtime  senna 2 Tablet(s) Oral at bedtime  sucralfate 1 Gram(s) Oral every 6 hours    MEDICATIONS  (PRN):  acetaminophen     Tablet .. 650 milliGRAM(s) Oral every 6 hours PRN Temp greater or equal to 38C (100.4F), Mild Pain (1 - 3), Moderate Pain (4 - 6)  meclizine 12.5 milliGRAM(s) Oral two times a day PRN Dizziness

## 2022-08-30 NOTE — PROGRESS NOTE ADULT - PROBLEM SELECTOR PLAN 1
Likely secondary to peripheral vertigo  Workup with: CT Brain: No acute intracranial hemorrhage, mass effect, or evidence of acute vascular territorial infarct. CTA H&N - without evidence of dissection, LVO; showing mild right and mild to moderate left stenoses at the origins of the internal carotid arteries. Orthostatics negative, TTE:  Mild diastolic dysfunction, with normal LVEF, negative bubble. MRI head, IAC w and w/o contrast- No acute infarct, hemorrhage, or mass, Chiari I malformation is suggested.  - Neurology consult appreciated, no further inpatient workup required  - c/w meclizine 12.5 BID PRN  - Vestibular Rehab on discharge  -  PT/OT consult -> subacute rehab recommended, denied by insurance, patient will prefer to go home with home PT  -discharge home today

## 2022-08-30 NOTE — PROGRESS NOTE ADULT - PROBLEM SELECTOR PLAN 8
DVT prophylaxis - Patient on Eliquis 5 mg BID for Afib.  Diet: DASH  Dispo: PT-> ZIA
c/w CPAP at home settings
DVT prophylaxis - Patient on Eliquis 5 mg BID for Afib.  Diet: DASH  Dispo: PT-> ZIA
c/w CPAP at home settings
c/w CPAP at home settings
DVT prophylaxis - Patient on Eliquis 5 mg BID for Afib.  Diet: passed dysphagia screen-> DASH  Dispo: PT-> ZIA
c/w CPAP at home settings

## 2022-08-30 NOTE — PROGRESS NOTE ADULT - PROBLEM SELECTOR PROBLEM 7
HARIS on CPAP
HLD (hyperlipidemia)
[Negative] : Heme/Lymph

## 2022-08-30 NOTE — PROGRESS NOTE ADULT - PROBLEM SELECTOR PLAN 6
Patient with history of Gastroesophageal reflux disease  - Continue with PPI from home with trial of Carafate 1gm Q6hrs  - Avoid spicy and aggravating foods.

## 2022-08-30 NOTE — DIETITIAN INITIAL EVALUATION ADULT - PERTINENT LABORATORY DATA
08-29    135  |  98  |  11  ----------------------------<  100<H>  3.9   |  26  |  0.65    Ca    9.3      29 Aug 2022 06:16  Phos  3.2     08-29  Mg     1.90     08-29    A1C with Estimated Average Glucose Result: 6.0 % (08-23-22 @ 11:42)  A1C with Estimated Average Glucose Result: 6.4 % (03-24-22 @ 07:39)

## 2022-09-27 ENCOUNTER — APPOINTMENT (OUTPATIENT)
Dept: GASTROENTEROLOGY | Facility: HOSPITAL | Age: 72
End: 2022-09-27

## 2022-09-27 ENCOUNTER — RESULT REVIEW (OUTPATIENT)
Age: 72
End: 2022-09-27

## 2022-09-27 ENCOUNTER — OUTPATIENT (OUTPATIENT)
Dept: OUTPATIENT SERVICES | Facility: HOSPITAL | Age: 72
LOS: 1 days | Discharge: HOME | End: 2022-09-27

## 2022-09-27 VITALS
OXYGEN SATURATION: 99 % | DIASTOLIC BLOOD PRESSURE: 54 MMHG | HEART RATE: 58 BPM | SYSTOLIC BLOOD PRESSURE: 150 MMHG | HEIGHT: 62 IN | WEIGHT: 194.01 LBS | TEMPERATURE: 98 F | RESPIRATION RATE: 17 BRPM

## 2022-09-27 VITALS
DIASTOLIC BLOOD PRESSURE: 74 MMHG | OXYGEN SATURATION: 98 % | SYSTOLIC BLOOD PRESSURE: 150 MMHG | RESPIRATION RATE: 19 BRPM | HEART RATE: 66 BPM

## 2022-09-27 DIAGNOSIS — Z96.651 PRESENCE OF RIGHT ARTIFICIAL KNEE JOINT: Chronic | ICD-10-CM

## 2022-09-27 DIAGNOSIS — Z98.890 OTHER SPECIFIED POSTPROCEDURAL STATES: Chronic | ICD-10-CM

## 2022-09-27 DIAGNOSIS — K22.10 ULCER OF ESOPHAGUS WITHOUT BLEEDING: ICD-10-CM

## 2022-09-27 PROCEDURE — 43239 EGD BIOPSY SINGLE/MULTIPLE: CPT

## 2022-09-27 PROCEDURE — 88305 TISSUE EXAM BY PATHOLOGIST: CPT | Mod: 26

## 2022-09-27 RX ORDER — DOCUSATE SODIUM 100 MG
1 CAPSULE ORAL
Qty: 0 | Refills: 0 | DISCHARGE

## 2022-09-27 RX ORDER — DILTIAZEM HCL 120 MG
0 CAPSULE, EXT RELEASE 24 HR ORAL
Qty: 0 | Refills: 1 | DISCHARGE

## 2022-09-27 RX ORDER — LOSARTAN/HYDROCHLOROTHIAZIDE 100MG-25MG
1 TABLET ORAL
Qty: 0 | Refills: 0 | DISCHARGE

## 2022-09-27 RX ORDER — OXYBUTYNIN CHLORIDE 5 MG
0 TABLET ORAL
Qty: 0 | Refills: 0 | DISCHARGE

## 2022-09-27 NOTE — ASU PATIENT PROFILE, ADULT - NSICDXPASTMEDICALHX_GEN_ALL_CORE_FT
PAST MEDICAL HISTORY:  Anxiety and depression     Atrial fibrillation persistent in 2022    GERD (gastroesophageal reflux disease)     H/O constipation     H/O fibromyalgia     HTN (Hypertension)     Latex allergy     Migraines     Morbid obesity due to excess calories     Muscle Cramps at Night     HARIS (obstructive sleep apnea) noncompliant with CPAP    Overactive bladder     Patient is Mosque refuse blood products    Pericarditis around age 30's    Primary osteoarthritis of right knee

## 2022-09-27 NOTE — ASU PATIENT PROFILE, ADULT - FALL HARM RISK - HARM RISK INTERVENTIONS
Assistance with ambulation/Assistance OOB with selected safe patient handling equipment/Communicate Risk of Fall with Harm to all staff/Discuss with provider need for PT consult/Monitor gait and stability/Reinforce activity limits and safety measures with patient and family/Tailored Fall Risk Interventions/Visual Cue: Yellow wristband and red socks/Bed in lowest position, wheels locked, appropriate side rails in place/Call bell, personal items and telephone in reach/Instruct patient to call for assistance before getting out of bed or chair/Non-slip footwear when patient is out of bed/[place] to call system/Physically safe environment - no spills, clutter or unnecessary equipment/Purposeful Proactive Rounding/Room/bathroom lighting operational, light cord in reach

## 2022-09-29 LAB — SURGICAL PATHOLOGY STUDY: SIGNIFICANT CHANGE UP

## 2022-10-01 DIAGNOSIS — B37.81 CANDIDAL ESOPHAGITIS: ICD-10-CM

## 2022-10-01 RX ORDER — FLUCONAZOLE 200 MG/1
200 TABLET ORAL
Qty: 15 | Refills: 0 | Status: ACTIVE | COMMUNITY
Start: 2022-10-01 | End: 1900-01-01

## 2022-10-24 ENCOUNTER — APPOINTMENT (OUTPATIENT)
Dept: ELECTROPHYSIOLOGY | Facility: CLINIC | Age: 72
End: 2022-10-24

## 2022-10-26 ENCOUNTER — APPOINTMENT (OUTPATIENT)
Dept: PULMONOLOGY | Facility: CLINIC | Age: 72
End: 2022-10-26

## 2023-03-31 ENCOUNTER — RX RENEWAL (OUTPATIENT)
Age: 73
End: 2023-03-31

## 2023-03-31 RX ORDER — LOSARTAN POTASSIUM AND HYDROCHLOROTHIAZIDE 12.5; 1 MG/1; MG/1
100-12.5 TABLET ORAL DAILY
Qty: 90 | Refills: 3 | Status: ACTIVE | COMMUNITY
Start: 2023-03-31 | End: 1900-01-01

## 2023-05-15 NOTE — ED PROVIDER NOTE - CPE EDP RESP NORM
Thank you for choosing Oma Tovar MD at [place] [address]  To Do: [address] [place]  1. Please take meds as directed. [facility] is located in [address]. Monday, Tuesday & Friday - 8 a.m. to 4 p.m. Wednesday, Thursday - 7 a.m. to 3 p.m. The lab is closed daily from 12 p.m.-12:30 p.m. Saturday lab hours by appointment. Call [phone] to schedule the appointment. Please signup for Crossborders, which is electronic access to your record if you have not done so. All your results will post on there. [email or web address]   You can NOW use Crossborders to book your appointments with us, or consider using open access scheduling which is through the edward website [email or web address] and type in Oma Tovar MD and follow the links for \"Schedule Online Now\"    To schedule Imaging or tests at [facility] Scheduling [phone], Go to [facility] A ER Building (For example: CT scans, X rays, Ultrasound, MRI)  Cardiac Testing in ER building Building A second floor Cardiac Testing [phone] (For example: Holter Monitor, Cardiac Stress tests,Event Monitor, or 2D Echocardiograms)  [facility] call [phone] usually in [facility] A  Walk in Clinic in [place] at [facility]. [address] Mon-Fri at 8am-7:30 p.m., and Sat/Sun 9:00a. m.-4:30 p.m. Also at [address] [facility]  Call [phone] for info     Please call our office about any questions regarding your treatment/medicines/tests as a result of today's visit. For your safety, read the entire package insert of all medicines prescribed to you and be aware of all of the risks of treatment even beyond those discussed today. All therapies have potential risk of harm or side effects or medication interactions.   It is your duty and for your safety to discuss with the pharmacist and our office with questions, and to notify us and stop treatment if problems arise, but know that our intention is that the benefits outweigh those potential risks and we strive to make you healthier and to improve your quality of life. Referrals, and Radiology Information:    If your insurance requires a referral to a specialist, please allow 5 business days to process your referral request.    If Nasir Broussard MD orders a CT or MRI, it may take up to 10 business days to receive approval from your insurance company. Once our office has called informing you that the insurance company approved your testing, please call Central Scheduling at [phone]  Please allow our office 5 business days to contact you regarding any testing results. Refill policies:   Allow 3 business days for refills; controlled substances may take longer and must be picked up from the office in person. Narcotic medications can only be filled in 30 day increments and must be refilled at an office visit only. If your prescription is due for a refill, you may be due for a follow-up appointment. We cannot refill your maintenance medications at a preventative wellness visit. To best provide you care, patients receiving maintenance medications need to be seen at least twice a year. normal...

## 2023-09-15 NOTE — PHYSICAL EXAM
CERTIFICATE OF WORK       September 15, 2023      Re: Anay MONTANO Satnam  [address] [facility] [address]      This is to certify that Anay MONTANO Satnam has been under my care from 9/15/2023 and can return to modified work with the following restriction:    RESTRICTIONS: No lifting greater than 5#.      REMARKS: Follow-up in 5 weeks                              SIGNATURE:___________________________________________          Judson Brown MD  [facility]  [address] [facility] [address]  Dept Phone: [phone]   [Well Developed] : well developed [Well Nourished] : well nourished [No Acute Distress] : no acute distress [Normal Conjunctiva] : normal conjunctiva [Normal Venous Pressure] : normal venous pressure [No Carotid Bruit] : no carotid bruit [Normal S1, S2] : normal S1, S2 [No Murmur] : no murmur [No Rub] : no rub [No Gallop] : no gallop [Irregularly Irregular] : irregularly irregular [Clear Lung Fields] : clear lung fields [Good Air Entry] : good air entry [No Respiratory Distress] : no respiratory distress  [Soft] : abdomen soft [Non Tender] : non-tender [No Masses/organomegaly] : no masses/organomegaly [Normal Bowel Sounds] : normal bowel sounds [Normal Gait] : normal gait [No Edema] : no edema [No Cyanosis] : no cyanosis [No Clubbing] : no clubbing [No Varicosities] : no varicosities [No Rash] : no rash [No Skin Lesions] : no skin lesions [Moves all extremities] : moves all extremities [No Focal Deficits] : no focal deficits [Normal Speech] : normal speech [Alert and Oriented] : alert and oriented [Normal memory] : normal memory

## 2024-01-10 ENCOUNTER — APPOINTMENT (OUTPATIENT)
Dept: PULMONOLOGY | Facility: CLINIC | Age: 74
End: 2024-01-10

## 2024-01-10 ENCOUNTER — APPOINTMENT (OUTPATIENT)
Dept: PULMONOLOGY | Facility: CLINIC | Age: 74
End: 2024-01-10
Payer: MEDICARE

## 2024-01-10 VITALS
RESPIRATION RATE: 16 BRPM | SYSTOLIC BLOOD PRESSURE: 163 MMHG | WEIGHT: 187 LBS | HEART RATE: 59 BPM | HEIGHT: 62 IN | BODY MASS INDEX: 34.41 KG/M2 | TEMPERATURE: 97.8 F | DIASTOLIC BLOOD PRESSURE: 82 MMHG | OXYGEN SATURATION: 93 %

## 2024-01-10 VITALS — OXYGEN SATURATION: 96 %

## 2024-01-10 DIAGNOSIS — G47.33 OBSTRUCTIVE SLEEP APNEA (ADULT) (PEDIATRIC): ICD-10-CM

## 2024-01-10 PROCEDURE — 99215 OFFICE O/P EST HI 40 MIN: CPT | Mod: GC

## 2024-01-10 NOTE — PHYSICAL EXAM
[General Appearance - Well Developed] : well developed [General Appearance - Well Nourished] : well nourished [Neck Appearance] : the appearance of the neck was normal [Heart Rate And Rhythm] : heart rate was normal and rhythm regular [] : no respiratory distress [Exaggerated Use Of Accessory Muscles For Inspiration] : no accessory muscle use [Abnormal Walk] : normal gait [Oriented To Time, Place, And Person] : oriented to person, place, and time [Impaired Insight] : insight and judgment were intact [No Focal Deficits] : no focal deficits

## 2024-01-10 NOTE — ASSESSMENT
[FreeTextEntry1] : This is a 73 year old female with severe HARIS here for a follow-up visit. She has been off therapy for over a year due to initial discomfort with the mask interface since receiving a new machine. As a result, she has been symptomatic with significant daytime fatigue and decreased in total sleep time and sleep quality. Mask fitting performed in office today. Prior CPAP titration from 2018 demonstrates optimal pressure of [id number]. We will transition to APAP 4-16 cmH2O today for further comfort optimization.   Follow-up in 3 months for a data download and compliance check.

## 2024-01-10 NOTE — HISTORY OF PRESENT ILLNESS
[DMS] : DMS [FreeTextEntry1] : 73-year-old female with severe HARIS on CPAP here for follow up.   Comorbid medical conditions include hypertension, atrial fibrillation, asthma, GERD, fibromyalgia, anxiety/depression.  Last seen 7/21/2022.  ApneaLink HST (5/30/2018) AHI of 35/hr.  CPAP titration (4/26/2018) optimal pressure of 15 cm H2O.  A repeat study was performed given 20 pounds weight loss since the time of HARIS diagnosis.  PSG (6/1/2022) AHI of 43.5/hr.   Device: AirSense 11 Settings: APAP 10 - 20 cm H2O DME: [facility]  Received new machine last year though has not been using her machine due to discomfort with the mask (transitioned from FFM to [place] and reports significant claustrophobia limiting use). Also reports feeling as though the pressures on her machine were inadequate resulting in the feeling of dyspnea. Has been off therapy for a year with symptoms of daytime fatigue, somnolence, and decreased sleep quality. Notes some discomfort with mask even prior to machine replacement. Estimated TST of 3-4 hours from 1am-4am.  [DIS] : no DIS

## 2024-01-10 NOTE — REVIEW OF SYSTEMS
[EDS: ESS=____] : daytime somnolence: ESS=[unfilled] [Fatigue] : fatigue [Difficulty Maintaining Sleep] : no difficulty maintaining sleep

## 2024-01-10 NOTE — PROCEDURE
[FreeTextEntry1] : Maskfitting Patient is currently using an unknown mask that is leaking and too big for the face.  She was given a ResMed Airfit F20 full face mask, size small. EMRE KIM was comfortable with the fit.  She was instructed on how to properly fit her mask and take it on and off. Patient will try this mask and follow up with us if any issues.

## 2024-04-17 ENCOUNTER — APPOINTMENT (OUTPATIENT)
Dept: PULMONOLOGY | Facility: CLINIC | Age: 74
End: 2024-04-17
Payer: MEDICARE

## 2024-04-17 VITALS
HEART RATE: 56 BPM | TEMPERATURE: 97.7 F | SYSTOLIC BLOOD PRESSURE: 178 MMHG | DIASTOLIC BLOOD PRESSURE: 75 MMHG | OXYGEN SATURATION: 97 % | WEIGHT: 190.38 LBS | RESPIRATION RATE: 15 BRPM | BODY MASS INDEX: 35.03 KG/M2 | HEIGHT: 62 IN

## 2024-04-17 PROCEDURE — 99213 OFFICE O/P EST LOW 20 MIN: CPT

## 2024-07-17 NOTE — DISCHARGE NOTE PROVIDER - NSRESEARCHGRANT_OVERRIDEREC_GEN_A_CORE
[facility]  Maternal-Fetal Medicine Inpatient Consultation    Date of Admission:  2024  Date of Consult:  2024    Reason for Consult:   SROM at 19+ weeks    History of Present Illness:  Kelly Wood is a a(n) 31 year old female.  with an IUP at Gestational Age: 19w5d    Who  presents with sudden onset of leaking fluid early this morning.  She did noticed increased vaginal discharge in the past few days.  No bleeding or cramping.  Last night she reports her stomach felt hard/ mildly uncomfortable like \"gas pain\".  No complications in pregnancy prior to this morning.    No bleeding, pain, fevers or chills.      Review of History:      OB History:    OB History    Para Term  AB Living   2 0 0 0 1 0   SAB IAB Ectopic Multiple Live Births   1 0 0 0 0      # Outcome Date GA Lbr Gwyn/2nd Weight Sex Type Anes PTL Lv   2 Current            1 SAB 2020               Other Relevant History:  History reviewed. No pertinent past medical history.  History reviewed. No pertinent surgical history.  History reviewed. No pertinent family history.   reports that she has never smoked. She has never used smokeless tobacco. She reports that she does not drink alcohol and does not use drugs.    Allergies:  No Known Allergies    Medications:    Current Facility-Administered Medications:     lactated ringers infusion, , Intravenous, Continuous    acetaminophen (Tylenol Extra Strength) tab 500 mg, 500 mg, Oral, Q6H PRN **OR** acetaminophen (Tylenol Extra Strength) tab 1,000 mg, 1,000 mg, Oral, Q6H PRN    zolpidem (Ambien) tab 5 mg, 5 mg, Oral, Nightly PRN    docusate sodium (Colace) cap 100 mg, 100 mg, Oral, BID    calcium carbonate (Tums) chewable tab 1,000 mg, 1,000 mg, Oral, Daily PRN    prenatal vitamin with DHA (PNV with DHA) cap 1 capsule, 1 capsule, Oral, Daily    Physical examination:  General: Alert, orientated x3.  Cooperative.  No apparent distress.  Vital Signs: Temp:  [98.2 °F  (36.8 °C)-98.7 °F (37.1 °C)] 98.7 °F (37.1 °C)  Pulse:  [104-105] 104  Resp:  [18] 18  BP: (108-113)/(66-72) 113/66  SpO2:  [100 %] 100 %\    HEENT: Exam is unremarkable.  Abdomen:  Gravid uterus appropriate for GA Nontender.  Skin: Normal texture and turgor.    Laboratory Data:  Lab Results   Component Value Date    WBC 15.0 07/17/2024    HGB 13.4 07/17/2024    HCT 38.4 07/17/2024    .0 07/17/2024    CREATSERUM 0.53 07/17/2024    BUN 5 07/17/2024     07/17/2024    K 3.2 07/17/2024     07/17/2024    CO2 24.0 07/17/2024     07/17/2024    CA 8.9 07/17/2024    ALB 3.9 07/17/2024    ALKPHO 76 07/17/2024    BILT 0.3 07/17/2024    TP 7.2 07/17/2024    AST 58 07/17/2024     07/17/2024       Fetal Ultrasound:  [id number]    Ultrasound Findings:  Single IUP in breech/ oblique presentation.   The fetal arm was seen extended into the length of the cervix  Placenta is posterior, high.   A unable to determine is noted.  Cardiac activity is present at 174 bpm   g ( 0 lb 10 oz)  MVP is 0 mm (no measurable amniotic fluid)    Transabdominal cervical length is 48 mm    Fibroids:  fundal, intramural:  18 x 13 x 15.4 mm  Anterior, intramural:  20 x 18 x 18.8 mm    The fetal measurements are consistent with the established EDC. No ultrasound evidence of structural abnormalities are seen today. The nasal bone is present. No ultrasound evidence of markers for aneuploidy are seen. She understands that ultrasound exam cannot exclude genetic abnormalities. The limitations of ultrasound were discussed.     Impression:    1.  IUP at 19w5d  2.  Biometry is consistent with dates  3.  Anhydramnios  4.  No fetal abnormalities noted, limited evaluation due to lack of amniotic fluid  5.  Two small intramural fibroids noted    NARRATIVE:   Mid-trimester PPROM -     I did review some correctable issues that may lead to mid-trimester PROM.  First I reviewed that uterine malformations may predispose to mid-trimester  PROM.  Given the patient's prior myomectomy, congenital malformation of the uterus that was not previously diagnosed is low.  The myomectomy is unlikely to have directly influenced this outcome.  However, an issue such as uterine synechiae may have developed and theoretically lead to uterine cavity compromise.  This is however quite unlikely.  Nevertheless, I advised a uterine cavity evaluation via hysterosonogram or hysterosalpingogram.  If a correctable uterine cavity defect is noted surgical correction would be advised prior to conceiving.     I also reviewed the potential of cervical insufficiency.   We discussed that I strongly suspect cervical insufficiency as the cause, mainly due to her report of increased discharge recently and that the fetal arm is noted in the length of the cervix.    They asked about treatments such as medications to \"heal\" the tear and cervical cerclage.  I explained that such treatment is not available here and that such protocols are experimental.      We discussed the very poor prognosis when there is no residual amniotic fluid.  I explained that the fetus will continue to make amniotic fluid but that it will continue to leak out due to the rupture and that the fetal arm appears to be delivering into the cervix.  I also explained that the dilation, rupture and lack of residual fluid increases her risk for intrauterine infection.  I explained that infection can spread to a life risking process called sepsis.  If a women has PPROM and sepsis in the second trimester, hysterectomy is often needed to save her life.  I explained that there is no treatment for a fetus at <22 weeks.    We discussed her h/o myomectomy and that IOL at this GA is not contraindicated.  I advised against hysterotomy unless needed at a maternal life preserving measure.  We discussed risks in future pregnancies and briefly anticipated management in future pregnancies (prophylactic cerclage vs serial ultrasound  cervical length.    IMPRESSION:    IUP at 19w5d  Mid-trimester PPROM with anhydramnios and evidence of cervical insufficiency  Elevated maternal LFTs, etiology unknown  H/o myomectomy    RECOMMENDATIONS:    I advised moving forward with augmentation/ induction of labor for the health and well being of the patient in light of her early GA and lack of amniotic fluid  Expectant management in the hospital 24 hours with repeat CBC and CMP in ~12 hours    Dr. Hagen was present for the ultrasound and consultation.  She answered additional questions from the patient and her , Al.    Total time spent 60 minutes this calendar day which includes preparing to see the patient including chart review, obtaining and/or reviewing additional medical history, performing a physical exam and evaluation, documenting clinical information in the electronic medical record, independently interpreting results, counseling the patient, communicating results to the patient/family/caregiver and coordinating care.     Case discussed with patient who demonstrated understanding and agreement with plan.     Thank you for allowing me to participate in the care of this patient.  Please feel free to contact me with any questions.    Alexus Mccarty MD  Maternal-Fetal Medicine    7/17/2024  8:27 AM     IMPROVE-DD Application Not Available

## 2024-08-14 ENCOUNTER — APPOINTMENT (OUTPATIENT)
Dept: PULMONOLOGY | Facility: CLINIC | Age: 74
End: 2024-08-14

## 2024-09-03 NOTE — PHYSICAL THERAPY INITIAL EVALUATION ADULT - ADL SKILLS, REHAB EVAL
Kirill Gonzalez is a 40 y.o.  at 27w6d (Estimated Date of Delivery: 24) presenting for CRISTIAN    Today no acute concerns, but is worried about razor burn around vulva. Has been present for a few weeks.    Hx of syphilis with stable titers, started on suppressive valtrex last appt.    Desires permanent sterilization for birth control.    Denies vaginal bleeding, loss of fluid, contractions, or decreased fetal movement    Past Medical History:   Diagnosis Date    Syphilis        OB History    Para Term  AB Living   5 3 3   1 3   SAB IAB Ectopic Multiple Live Births   1     0 3      # Outcome Date GA Lbr Elijah/2nd Weight Sex Type Anes PTL Lv   5 Current            4 Term 01/15/20 40w0d  3.54 kg (7 lb 12.9 oz) F Vag-Spont EPI N CONNOR   3 SAB      SAB      2 Term 11    F Vag-Spont   CONNOR   1 Term 07   3.232 kg (7 lb 2 oz) M Vag-Spont   CONNOR       Review of patient's allergies indicates:  No Known Allergies     Vitals  BP: 114/68  Weight: 103.3 kg (227 lb 10 oz)  Prenatal  Fundal Height (cm): 28 cm  Fetal Heart Rate: 150  Movement: Present     Assessment & Plan    - Continue PNV and baby aspirin  - No ulcers or lesions on vulva/perineum. Small hemorrhoid noted, advised pt on constipation and stool softener use  - Desires repeat RPR to  be reassured about bumps on vagina  - CBC and GTT today  - Tdap scheduled with next visit    RTC in 4 weeks    Total time spent on visit was 20 minutes. This includes face to face time and non-face to face time preparing to see the patient (eg, review of tests), use of video or in-person , obtaining and/or reviewing separately obtained history, documenting clinical information in the electronic or other health record, independently interpreting results and communicating results to the patient/family/caregiver, or care coordination.        independent

## 2024-09-12 NOTE — OCCUPATIONAL THERAPY INITIAL EVALUATION ADULT - BATHING, PREVIOUS LEVEL OF FUNCTION, OT EVAL
Anesthesia Post-op Note    Patient: Tino Frias  Procedure(s) Performed: SACRAL WOUND DEBRIDEMENT  Anesthesia type: General    Vitals Value Taken Time   Temp 36.6 °C (97.8 °F) 09/12/24 1613   Pulse 81 09/12/24 1637   Resp 25 09/12/24 1637   SpO2 91 % 09/12/24 1637   /76 09/12/24 1630   Vitals shown include unfiled device data.      Patient Location: PACU Phase 1  Post-op Vital Signs:stable  Level of Consciousness: awake and alert  Respiratory Status: spontaneous ventilation  Cardiovascular stable  Hydration: euvolemic  Pain Management: adequately controlled  Handoff: Handoff to receiving clinician was performed and questions were answered  Vomiting: none  Nausea: None  Airway Patency:patent  Post-op Assessment: no complications and patient tolerated procedure well      There were no known notable events for this encounter.                       independent

## 2024-11-14 ENCOUNTER — NON-APPOINTMENT (OUTPATIENT)
Age: 74
End: 2024-11-14

## 2024-11-18 ENCOUNTER — NON-APPOINTMENT (OUTPATIENT)
Age: 74
End: 2024-11-18

## 2024-12-04 NOTE — PROGRESS NOTE ADULT - PROBLEM SELECTOR PROBLEM 9
Migraine
Patients wife called and stated the form that Dr Celeste filled out for the Patient Assistance Program Application with [employer] was not completed. Patient wife advised the company is needing Numbers 7, 8, and 9 filled out on the application and then faxed back to [employer].   
Migraine

## 2025-03-14 ENCOUNTER — INPATIENT (INPATIENT)
Facility: HOSPITAL | Age: 75
LOS: 2 days | Discharge: HOME | DRG: 690 | End: 2025-03-17
Attending: STUDENT IN AN ORGANIZED HEALTH CARE EDUCATION/TRAINING PROGRAM | Admitting: STUDENT IN AN ORGANIZED HEALTH CARE EDUCATION/TRAINING PROGRAM
Payer: COMMERCIAL

## 2025-03-14 VITALS
RESPIRATION RATE: 15 BRPM | WEIGHT: 160.06 LBS | TEMPERATURE: 98 F | DIASTOLIC BLOOD PRESSURE: 73 MMHG | SYSTOLIC BLOOD PRESSURE: 185 MMHG | HEART RATE: 67 BPM | HEIGHT: 63 IN | OXYGEN SATURATION: 94 %

## 2025-03-14 DIAGNOSIS — Z96.651 PRESENCE OF RIGHT ARTIFICIAL KNEE JOINT: Chronic | ICD-10-CM

## 2025-03-14 DIAGNOSIS — E78.5 HYPERLIPIDEMIA, UNSPECIFIED: ICD-10-CM

## 2025-03-14 DIAGNOSIS — I48.20 CHRONIC ATRIAL FIBRILLATION, UNSPECIFIED: ICD-10-CM

## 2025-03-14 DIAGNOSIS — Z98.890 OTHER SPECIFIED POSTPROCEDURAL STATES: Chronic | ICD-10-CM

## 2025-03-14 DIAGNOSIS — N39.0 URINARY TRACT INFECTION, SITE NOT SPECIFIED: ICD-10-CM

## 2025-03-14 DIAGNOSIS — Z29.9 ENCOUNTER FOR PROPHYLACTIC MEASURES, UNSPECIFIED: ICD-10-CM

## 2025-03-14 DIAGNOSIS — I10 ESSENTIAL (PRIMARY) HYPERTENSION: ICD-10-CM

## 2025-03-14 LAB
ALBUMIN SERPL ELPH-MCNC: 4 G/DL — SIGNIFICANT CHANGE UP (ref 3.3–5)
ALP SERPL-CCNC: 91 U/L — SIGNIFICANT CHANGE UP (ref 40–120)
ALT FLD-CCNC: 18 U/L — SIGNIFICANT CHANGE UP (ref 10–45)
ANION GAP SERPL CALC-SCNC: 15 MMOL/L — SIGNIFICANT CHANGE UP (ref 5–17)
APPEARANCE UR: CLEAR — SIGNIFICANT CHANGE UP
APTT BLD: 38.6 SEC — HIGH (ref 24.5–35.6)
AST SERPL-CCNC: 22 U/L — SIGNIFICANT CHANGE UP (ref 10–40)
BACTERIA # UR AUTO: ABNORMAL /HPF
BASOPHILS # BLD AUTO: 0.06 K/UL — SIGNIFICANT CHANGE UP (ref 0–0.2)
BASOPHILS NFR BLD AUTO: 0.6 % — SIGNIFICANT CHANGE UP (ref 0–2)
BILIRUB SERPL-MCNC: 0.4 MG/DL — SIGNIFICANT CHANGE UP (ref 0.2–1.2)
BILIRUB UR-MCNC: NEGATIVE — SIGNIFICANT CHANGE UP
BUN SERPL-MCNC: 16 MG/DL — SIGNIFICANT CHANGE UP (ref 7–23)
CALCIUM SERPL-MCNC: 9.4 MG/DL — SIGNIFICANT CHANGE UP (ref 8.4–10.5)
CAST: 0 /LPF — SIGNIFICANT CHANGE UP (ref 0–4)
CHLORIDE SERPL-SCNC: 97 MMOL/L — SIGNIFICANT CHANGE UP (ref 96–108)
CO2 SERPL-SCNC: 21 MMOL/L — LOW (ref 22–31)
COLOR SPEC: YELLOW — SIGNIFICANT CHANGE UP
CREAT SERPL-MCNC: 0.83 MG/DL — SIGNIFICANT CHANGE UP (ref 0.5–1.3)
EGFR: 73 ML/MIN/1.73M2 — SIGNIFICANT CHANGE UP
EGFR: 73 ML/MIN/1.73M2 — SIGNIFICANT CHANGE UP
EOSINOPHIL # BLD AUTO: 0.04 K/UL — SIGNIFICANT CHANGE UP (ref 0–0.5)
EOSINOPHIL NFR BLD AUTO: 0.4 % — SIGNIFICANT CHANGE UP (ref 0–6)
FLUAV AG NPH QL: SIGNIFICANT CHANGE UP
FLUBV AG NPH QL: SIGNIFICANT CHANGE UP
GLUCOSE SERPL-MCNC: 124 MG/DL — HIGH (ref 70–99)
GLUCOSE UR QL: NEGATIVE MG/DL — SIGNIFICANT CHANGE UP
HCT VFR BLD CALC: 38.7 % — SIGNIFICANT CHANGE UP (ref 34.5–45)
HGB BLD-MCNC: 12.5 G/DL — SIGNIFICANT CHANGE UP (ref 11.5–15.5)
IMM GRANULOCYTES NFR BLD AUTO: 0.6 % — SIGNIFICANT CHANGE UP (ref 0–0.9)
INR BLD: 1.48 RATIO — HIGH (ref 0.85–1.16)
KETONES UR-MCNC: NEGATIVE MG/DL — SIGNIFICANT CHANGE UP
LEUKOCYTE ESTERASE UR-ACNC: ABNORMAL
LYMPHOCYTES # BLD AUTO: 1.77 K/UL — SIGNIFICANT CHANGE UP (ref 1–3.3)
LYMPHOCYTES # BLD AUTO: 16.5 % — SIGNIFICANT CHANGE UP (ref 13–44)
MCHC RBC-ENTMCNC: 28.9 PG — SIGNIFICANT CHANGE UP (ref 27–34)
MCHC RBC-ENTMCNC: 32.3 G/DL — SIGNIFICANT CHANGE UP (ref 32–36)
MCV RBC AUTO: 89.4 FL — SIGNIFICANT CHANGE UP (ref 80–100)
MONOCYTES # BLD AUTO: 0.66 K/UL — SIGNIFICANT CHANGE UP (ref 0–0.9)
MONOCYTES NFR BLD AUTO: 6.2 % — SIGNIFICANT CHANGE UP (ref 2–14)
NEUTROPHILS NFR BLD AUTO: 75.7 % — SIGNIFICANT CHANGE UP (ref 43–77)
NITRITE UR-MCNC: POSITIVE
NRBC BLD AUTO-RTO: 0 /100 WBCS — SIGNIFICANT CHANGE UP (ref 0–0)
PH UR: 7 — SIGNIFICANT CHANGE UP (ref 5–8)
PLATELET # BLD AUTO: 299 K/UL — SIGNIFICANT CHANGE UP (ref 150–400)
POTASSIUM SERPL-MCNC: 4.4 MMOL/L — SIGNIFICANT CHANGE UP (ref 3.5–5.3)
POTASSIUM SERPL-SCNC: 4.4 MMOL/L — SIGNIFICANT CHANGE UP (ref 3.5–5.3)
PROT SERPL-MCNC: 7.5 G/DL — SIGNIFICANT CHANGE UP (ref 6–8.3)
PROT UR-MCNC: NEGATIVE MG/DL — SIGNIFICANT CHANGE UP
PROTHROM AB SERPL-ACNC: 16.8 SEC — HIGH (ref 9.9–13.4)
RBC # BLD: 4.33 M/UL — SIGNIFICANT CHANGE UP (ref 3.8–5.2)
RBC # FLD: 13 % — SIGNIFICANT CHANGE UP (ref 10.3–14.5)
RBC CASTS # UR COMP ASSIST: 0 /HPF — SIGNIFICANT CHANGE UP (ref 0–4)
RSV RNA NPH QL NAA+NON-PROBE: SIGNIFICANT CHANGE UP
SARS-COV-2 RNA SPEC QL NAA+PROBE: SIGNIFICANT CHANGE UP
SODIUM SERPL-SCNC: 133 MMOL/L — LOW (ref 135–145)
SP GR SPEC: 1.01 — SIGNIFICANT CHANGE UP (ref 1–1.03)
SQUAMOUS # UR AUTO: 1 /HPF — SIGNIFICANT CHANGE UP (ref 0–5)
TROPONIN T, HIGH SENSITIVITY RESULT: 11 NG/L — SIGNIFICANT CHANGE UP (ref 0–51)
UROBILINOGEN FLD QL: 0.2 MG/DL — SIGNIFICANT CHANGE UP (ref 0.2–1)
WBC # BLD: 10.72 K/UL — HIGH (ref 3.8–10.5)
WBC # FLD AUTO: 10.72 K/UL — HIGH (ref 3.8–10.5)
WBC UR QL: 13 /HPF — HIGH (ref 0–5)

## 2025-03-14 PROCEDURE — 70496 CT ANGIOGRAPHY HEAD: CPT | Mod: 26

## 2025-03-14 PROCEDURE — 70450 CT HEAD/BRAIN W/O DYE: CPT | Mod: 26,XU

## 2025-03-14 PROCEDURE — 99223 1ST HOSP IP/OBS HIGH 75: CPT

## 2025-03-14 PROCEDURE — 99285 EMERGENCY DEPT VISIT HI MDM: CPT

## 2025-03-14 PROCEDURE — 70498 CT ANGIOGRAPHY NECK: CPT | Mod: 26

## 2025-03-14 RX ORDER — APIXABAN 2.5 MG/1
5 TABLET, FILM COATED ORAL EVERY 12 HOURS
Refills: 0 | Status: DISCONTINUED | OUTPATIENT
Start: 2025-03-14 | End: 2025-03-17

## 2025-03-14 RX ORDER — CEFTRIAXONE 500 MG/1
1000 INJECTION, POWDER, FOR SOLUTION INTRAMUSCULAR; INTRAVENOUS EVERY 24 HOURS
Refills: 0 | Status: COMPLETED | OUTPATIENT
Start: 2025-03-15 | End: 2025-03-16

## 2025-03-14 RX ORDER — BUPROPION HYDROBROMIDE 522 MG/1
1 TABLET, EXTENDED RELEASE ORAL
Refills: 0 | DISCHARGE

## 2025-03-14 RX ORDER — ROSUVASTATIN CALCIUM 20 MG/1
5 TABLET, FILM COATED ORAL AT BEDTIME
Refills: 0 | Status: DISCONTINUED | OUTPATIENT
Start: 2025-03-14 | End: 2025-03-17

## 2025-03-14 RX ORDER — ROSUVASTATIN CALCIUM 20 MG/1
1 TABLET, FILM COATED ORAL
Refills: 0 | DISCHARGE

## 2025-03-14 RX ORDER — BUPROPION HYDROBROMIDE 522 MG/1
300 TABLET, EXTENDED RELEASE ORAL DAILY
Refills: 0 | Status: DISCONTINUED | OUTPATIENT
Start: 2025-03-14 | End: 2025-03-17

## 2025-03-14 RX ORDER — LOSARTAN POTASSIUM 100 MG/1
100 TABLET, FILM COATED ORAL DAILY
Refills: 0 | Status: DISCONTINUED | OUTPATIENT
Start: 2025-03-14 | End: 2025-03-15

## 2025-03-14 RX ORDER — INFLUENZA A VIRUS A/IDAHO/07/2018 (H1N1) ANTIGEN (MDCK CELL DERIVED, PROPIOLACTONE INACTIVATED, INFLUENZA A VIRUS A/INDIANA/08/2018 (H3N2) ANTIGEN (MDCK CELL DERIVED, PROPIOLACTONE INACTIVATED), INFLUENZA B VIRUS B/SINGAPORE/INFTT-16-0610/2016 ANTIGEN (MDCK CELL DERIVED, PROPIOLACTONE INACTIVATED), INFLUENZA B VIRUS B/IOWA/06/2017 ANTIGEN (MDCK CELL DERIVED, PROPIOLACTONE INACTIVATED) 15; 15; 15; 15 UG/.5ML; UG/.5ML; UG/.5ML; UG/.5ML
0.5 INJECTION, SUSPENSION INTRAMUSCULAR ONCE
Refills: 0 | Status: DISCONTINUED | OUTPATIENT
Start: 2025-03-14 | End: 2025-03-17

## 2025-03-14 RX ORDER — ONDANSETRON HCL/PF 4 MG/2 ML
4 VIAL (ML) INJECTION EVERY 8 HOURS
Refills: 0 | Status: DISCONTINUED | OUTPATIENT
Start: 2025-03-14 | End: 2025-03-17

## 2025-03-14 RX ORDER — CEFTRIAXONE 500 MG/1
1000 INJECTION, POWDER, FOR SOLUTION INTRAMUSCULAR; INTRAVENOUS ONCE
Refills: 0 | Status: COMPLETED | OUTPATIENT
Start: 2025-03-14 | End: 2025-03-14

## 2025-03-14 RX ORDER — ACETAMINOPHEN 500 MG/5ML
650 LIQUID (ML) ORAL EVERY 6 HOURS
Refills: 0 | Status: DISCONTINUED | OUTPATIENT
Start: 2025-03-14 | End: 2025-03-17

## 2025-03-14 RX ORDER — DILTIAZEM HYDROCHLORIDE 240 MG/1
180 TABLET, EXTENDED RELEASE ORAL DAILY
Refills: 0 | Status: DISCONTINUED | OUTPATIENT
Start: 2025-03-14 | End: 2025-03-15

## 2025-03-14 RX ADMIN — CEFTRIAXONE 100 MILLIGRAM(S): 500 INJECTION, POWDER, FOR SOLUTION INTRAMUSCULAR; INTRAVENOUS at 15:53

## 2025-03-14 RX ADMIN — LOSARTAN POTASSIUM 100 MILLIGRAM(S): 100 TABLET, FILM COATED ORAL at 21:33

## 2025-03-14 RX ADMIN — Medication 40 MILLIGRAM(S): at 21:33

## 2025-03-14 RX ADMIN — ROSUVASTATIN CALCIUM 5 MILLIGRAM(S): 20 TABLET, FILM COATED ORAL at 21:32

## 2025-03-14 RX ADMIN — APIXABAN 5 MILLIGRAM(S): 2.5 TABLET, FILM COATED ORAL at 21:32

## 2025-03-14 NOTE — ED ADULT NURSE NOTE - OBJECTIVE STATEMENT
75 yr old female to ED accomp by spouse c/o short term memory loss over 3 weeks Pt awake alert and orientedx3 at this time Denies chest pain C/o sob on exertion Denies fever or chills. Per spouse, Pt rambling about the past and noted yesterday at 930am rt sided facial drooping Last few mins and pt felt like she was going to pass out and started to shake. Pt thought she had Parkinson's. Pt has h/o afib with failed ablation x 5-6 years ago , on Eliquis, HTN . Responds approp to verbal and tactile stimuli .refer PMD Abraham .

## 2025-03-14 NOTE — PATIENT PROFILE ADULT - HAVE YOU EXPERIENCED VIOLENCE OR A TRAUMATIC EVENT?
Assessment/Plan:    Diagnoses and all orders for this visit:    1. Encounter for screening for HIV    All recent testing is neg. Discussed the results with the pt  His nodule on his penis is resolved  F/up PRN     Follow-up Disposition:  Return if symptoms worsen or fail to improve. JESUS Cunha expressed understanding of this plan. An AVS was printed and given to the patient.      ----------------------------------------------------------------------    Chief Complaint   Patient presents with    Follow-up      Lab results       History of Present Illness:  Pt here for recheck on the nodule on his penis but he states that it is resolved and he has no further complaints. He declines the offer for examination   Discussed that his results are normal for HIV, RPR and gc/ct      No past medical history on file. Current Outpatient Prescriptions   Medication Sig Dispense Refill    cephALEXin (KEFLEX) 500 mg capsule Take 1 Cap by mouth three (3) times daily. 30 Cap 0    erythromycin (ILOTYCIN) ophthalmic ointment Sig; use 0.5 cm ribbon in OD qid for 5 days 1 g 0    loratadine (CLARITIN) 10 mg tablet Take 1 Tab by mouth daily. 30 Tab 6    triamcinolone acetonide (KENALOG) 0.1 % topical cream Apply  to affected area two (2) times a day. use thin layer 80 g 0       No Known Allergies    Social History   Substance Use Topics    Smoking status: Former Smoker     Types: Cigarettes     Quit date: 4/5/2016    Smokeless tobacco: Former User     Quit date: 3/30/2013      Comment: 1 a day    Alcohol use Yes      Comment: occ       No family history on file.     Physical Exam:     Visit Vitals    /73 (BP 1 Location: Right arm, BP Patient Position: Sitting)    Pulse 87    Temp 98.1 °F (36.7 °C) (Oral)    Wt 194 lb (88 kg)    BMI 30.74 kg/m2       A&Ox3  WDWN NAD  Respirations normal and non labored no

## 2025-03-14 NOTE — H&P ADULT - PROBLEM SELECTOR PLAN 2
Elevated BP   - Continue losartan 100mg qd  - Monitor BP  - Can consider addition of a diuretic for additional BP control which may also help LE edema

## 2025-03-14 NOTE — H&P ADULT - ASSESSMENT
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 Pt admitted for further management.

## 2025-03-14 NOTE — H&P ADULT - PROBLEM SELECTOR PLAN 3
Currently rate controlled  - Continue Eliquis 5mg BID  - Continue diltiazem 180mg qd  - Currently rate controlled  - Continue Eliquis 5mg BID  - Continue diltiazem 180mg qd  - EKG showing NSR, HR 67

## 2025-03-14 NOTE — ED PROVIDER NOTE - CLINICAL SUMMARY MEDICAL DECISION MAKING FREE TEXT BOX
74 y/o F hx of afib on eliquis, HTN, presents with worsening confusion for the past 2-3 weeks, but worsened in the past 3-4 days. Patient has also seemed unsteady with ambulation. Last saw a neurologist 1.5 years ago for concern for dementia. Per , denies recent head strike, chest pain, SOB, abd pain, N/V/D.     Patient well appearing ANOx3, responds predominantly  appropriate to questions with some episodes of tangental thinking, lungs CTAB, abd nontender, neuro exam notable for slight left droop at nasolabial fold. Concern for subacute ischemic stroke. WIll also eval for infectious etiology, electrolyte derangment. Labs, CT, UA, neurology evaluation.

## 2025-03-14 NOTE — ED ADULT NURSE NOTE - CCCP TRG CHIEF CMPLNT
CASH ambulatory encounter  FAMILY PRACTICE OFFICE VISIT      SUBJECTIVE:  Trino is a 23 year old male who presents for: Blood Pressure and Gastro-intestinal Problem (Having bowel movements more than usual.)    GI CONCERNS:  States that he noticed that he is having more frequent Bms after he eats foods. Had a skillet and hashbrowns one time and had green stools afterward. Denies general abd pain. Just noticing that Bms are more frequent and the stool can be more broken apart than previously. No diarrhea. Been noticing this for the last 2-3 weeks primarily. No hx of celiac disease in family. Has not done food elimination diet. Occurring when he is eating out, rather than dining at home.       REVIEW OF SYSTEMS:  Documented in the history of present illness.    PAST HISTORIES:  I have reviewed the past medical history, family history, social history, medications and allergies listed in the medical record as obtained by support staff and agree with documentation. Details can be reviewed in this encounter summary.      OBJECTIVE:  Body mass index is 34.86 kg/m².  Vitals:    05/01/24 1612   BP: 132/84   Pulse: 77   Resp: 16   Temp: 98 °F (36.7 °C)   SpO2: 99%   Weight: 98 kg (216 lb)   Height: 5' 6\" (1.676 m)       Physical Exam  Constitutional:       General: He is not in acute distress.     Appearance: Normal appearance.   HENT:      Head: Normocephalic and atraumatic.      Right Ear: External ear normal.      Left Ear: External ear normal.   Eyes:      Extraocular Movements: Extraocular movements intact.      Conjunctiva/sclera: Conjunctivae normal.      Pupils: Pupils are equal, round, and reactive to light.   Pulmonary:      Effort: Pulmonary effort is normal. No respiratory distress.   Neurological:      General: No focal deficit present.      Mental Status: He is alert.   Psychiatric:         Mood and Affect: Mood normal.         Behavior: Behavior normal.         Thought Content: Thought content normal.          LABORATORY/IMAGING: Pertinent results reviewed and noted in subjective section. Orders placed today can be viewed in encounter summary.    ASSESSMENT AND PLAN:  Problem List Items Addressed This Visit          Allergies and Adverse Reactions    Food sensitivity with gastrointestinal symptoms - Primary     Pt notes over last several weeks to have increase in # of BM as well as more segmented stools, no diarrhea. Because this appears to be exacerbated by dining out, I do wonder if he could have a component of sensitivity or allergy to certain cooking oil? Recommend food elimination diet, one type of food per week to see if sx improve. Advised to check with restaurants and see what oil they use to cook food, and see if he can find common denominator from that. Offered celiac testing, but pt and I agree this is less likely celiac based on the fact he can have carbs at home without issues. Advised pt to let me know if he wants referral to allergist and I can place that for him to be evaluated for any food insensitivities. Follow up with me as needed.             Endocrine and Metabolic    Hypothyroidism due to Hashimoto's thyroiditis     Stable and controlled on levothyroxine 112 mcg daily. Pt is having change in Bms, and will be due for annual recheck thyroid function in several months. Since he is here in clinic today, will get lab test to evaluate thyroid function. Follow up as needed and can refill medication as needed.          Relevant Orders    Thyroid Stimulating Hormone       Mental Health    Attention deficit disorder (ADD) without hyperactivity     Pt notes that he has begun following outside psychiatrist for his mental health, binge eating disorder. Now on adderall 5 mg BID, reviewed PDMP for prescription dosing to confirm. Follow up with me as needed, continue cares with psych.           Other Visit Diagnoses       Need for Tdap vaccination        given tetanus booster today    Relevant Orders     TETANUS DIPHTHERIA ACELLULAR PERTUSSIS VACC, 10+ YRS (BOOSTRIX) (Completed)            FOLLOW UP and AVS  Return in about 1 year (around 5/1/2025) for CPE.    No future appointments.      Lev Rodgers,   05/01/24     see medical complaints/sent by MD

## 2025-03-14 NOTE — CONSULT NOTE ADULT - ATTENDING COMMENTS
HPI as per resident note, personally verified by me. Patient seen with , at bedside, who corroborated key elements of her history. Patient had previously seen a neurologist in [phone] for memory issues and diagnosed with likely mild dementia; patient did not follow-up with him and no medication started. Patient's cognition continued to gradually decline but for the past several days she has had an acute worsening of mentation with word finding difficulty, visual hallucinations, and dizziness, mainly with standing. She denied any focal neurologic deficits or abnormal movements. She came to [facility] and was found to have UTI and started on treatment with ceftriaxone. Patient reports the hallucinations have resolved but dizziness persists. Wants to go home, when able.    Neurologic exam as per resident note with additions as below:  AAO x2.75 (did not know day of the month but rest intact), speech fluent  CN's II-XII intact  Strength 5/5 all  Sens intact all  FtN intact b/l. BUT intention tremor without postural component  Downgoing b/l plantar response    UA (+), RVP (-)    < from: CT Angio Neck w/ IV Cont (03.14.25 @ 15:11) >    VENOUS STRUCTURES: Visualized superficial and deep venous structures   appear patent.    IMPRESSION:    CT HEAD:  No acute intracranial hemorrhage, mass effect, or midline shift. Moderate   chronic microvascular ischemic changes.    CTA NECK:  Calcified atherosclerotic plaque at the carotid bifurcations bilaterally.   There is associated moderate stenosis of the proximal right internal   carotid artery and mild to moderate stenosis of the proximal left   internal carotid artery.    CTA HEAD:  No large vessel occlusion, significant stenosis or vascular abnormality   identified.    < end of copied text >      A/P:  Encephalopathy  Dizziness  Speech disturbance  HTN  Atrial fibrillation on Eliquis  HARIS on CPAP  Chiari 1 malformation  UTI    - Patient with worsened mentation, visual hallucinations, speech changes, and dizziness superimposed on more chronic cognitive changes likely due to toxic/metabolic process due to acute medical issue with UTI but cannot exclude other causes. Dizziness likely due to same peripheral process or cardiac cause. No reported focal neurologic deficits or abnormal movements to suggest cerebrovascular event or seizure. CT head and CTA head/neck, personally reviewed by me, with small vessel ischemic changes and atrophy but no other acute intracranial findings, focal stenosis, occlusion, aneurysm, dissection, or venous sinus thrombosis. Currently improving with UTI treatment  - Labs as per resident note  - Orthostatic vital signs  - No neurologic contraindications to discharge if patient is otherwise medically stable. She can follow-up with her own neurologist as outpatient  - Continue to address above medical problems, as you are doing  - Will sign off, please call ([phone]) with additional questions or concerns

## 2025-03-14 NOTE — ED ADULT NURSE REASSESSMENT NOTE - NS ED NURSE REASSESS COMMENT FT1
1250 Pt in ER red burrell 3. A&Ox4. Denies pain. Color pink. Skin W&D.  at stretcher side. Fall risk precautions maintained. Pt denies pain or dizziness. IVL intact without sx of infilt. Passed Dysphagia screen.

## 2025-03-14 NOTE — ED PROVIDER NOTE - PROGRESS NOTE DETAILS
neurology consulted Discussed with neurology, likely 2/2 to UTI, however if symptoms persist will obtain MRI. Discussed with hospitalist, TBA.  Marco A Kowalski PGY-3

## 2025-03-14 NOTE — ED ADULT NURSE NOTE - NS ED PATIENT SAFETY CONCERN
Observed client passing an object to another peer while waiting for transportation. This object was later discovered to be a vape. Will discuss with client's family  
Unable to assess due to medical condition

## 2025-03-14 NOTE — H&P ADULT - NSHPPHYSICALEXAM_GEN_ALL_CORE
Vital Signs Last 24 Hrs  T(C): 36.4 (14 Mar 2025 20:30), Max: 36.8 (14 Mar 2025 20:15)  T(F): 97.6 (14 Mar 2025 20:30), Max: 98.2 (14 Mar 2025 20:15)  HR: 63 (14 Mar 2025 20:30) (61 - 68)  BP: 178/71 (14 Mar 2025 20:30) (152/63 - 185/73)  BP(mean): --  RR: 16 (14 Mar 2025 20:30) (15 - 18)  SpO2: 98% (14 Mar 2025 20:30) (94% - 98%)    Parameters below as of 14 Mar 2025 20:15  Patient On (Oxygen Delivery Method): room air        CONSTITUTIONAL: Well-groomed, in no apparent distress  EYES: No conjunctival or scleral injection, non-icteric; PERRLA and symmetric  ENMT: No external nasal lesions; nasal mucosa not inflamed; oral mucosa with moist membranes  RESPIRATORY: Breathing comfortably; lungs CTA without wheeze/rhonchi/rales  CARDIOVASCULAR: +S1S2, RRR, no M/G/R; 1+ lower extremity edema  GASTROINTESTINAL: No palpable masses or tenderness, +BS throughout, no rebound/guarding  MUSCULOSKELETAL:  No digital clubbing or cyanosis; no paraspinal tenderness; no malalignment of extremities  SKIN: No rashes or ulcers noted; no subcutaneous nodules or induration palpable  NEUROLOGIC: CN grossly intact; sensation intact in LEs b/l to light touch; normal strength and tone  PSYCHIATRIC: A+O x 3; mood and affect appropriate; appropriate insight and judgment

## 2025-03-14 NOTE — ED ADULT TRIAGE NOTE - WEIGHT IN KG
MD Chaparro Grissom MD Marilou Stamp, MD                  Office: [phone]                      Fax: [phone]             [facility]                   NEPHROLOGY INPATIENT PROGRESS NOTE:     PATIENT NAME: Bety Blackmon  : 1939  MRN: [id number]      RECOMMENDATIONS:   -consider use PRN lasix only, as w/ recent severe hypernatremia, dehydration , higher free water deficits  -Recheck Na -since its acute, should be okay for acute reversal  -avoid aggressive diuretics,     -can restart Gabapentin 100 TID max  -discussed NSAIDs avoidance, as was on Naproxen    - hold RAAS blockade, due to hypotension and ARUN  -Goal hemoglobin above 7, will help with hypotension  - Work-up for infection with leukocytosis and hypotension - per primary team   -  marin inserted  -Close follow-up with hematology, cardiology,  - no need for dialysis, at higher risk for decompensation, needing closer monitoring.     D/C plan from renal stand point:  -Unclear with acute illness currently and expect prolonged hospitalization  -Family is discussing with palliative team    D/W patient, her daughter who translated, team CVU nurse also       IMPRESSION:       Admitted on:  10/10/2022 10:51 AM   For:  Shoulder pain [M25.519]  Acute chest pain [R07.9]  History of coronary artery disease [Z86.79]  Chronic congestive heart failure, unspecified heart failure type ([facility] [I50.9]           ARUN (on no h/o CKD:):   - BL Scr-0.8 as of 1 admission on 10--> peaked at 1.9 within 48 hours during admission  - Etiology of ARUN -presumed ATN in the setting of hemoglobin 6.7, hypotension, concern for infection with leukocytosis + CT angio on 10-,   -With clinical multiple other culprits for ARUN I do not suspect any other etiology so no need for serology work-up for now  - UA : results reviewed: 250 glucose, trace ketones, high specific gravity large blood, 100 protein, trace leukocytes, few hyaline cast, with RBCs almost 200, with WBCs. - Renal imaging: none recently   -In 48 hours no improvement with culprit for ARUN, and then cath kidney imaging      Associated problems:  : HTN : no need for tight control   : Na: Severe hypernatremia, dehydration , higher free water deficits    - Azotemia: pre-renal  - Electrolytes: K: WNL  - Acid-Base: acidosis -  non-AGMA  - Anemia: Likely due to myelodysplastic syndrome    Other major problems: Management per primary and other consulting teams. Morbid obesity  Obstructive sleep apnea on CPAP  Coronary artery disease, history of CABG    Diagnosis of myelodysplastic syndrome after bone marrow biopsy in September 2022    Chronic systolic heart failure-EF around 45-50%      Hospital Problems             Last Modified POA    * (Principal) Acute hypoxemic respiratory failure ([address] 10/19/2022 Yes    Acute diastolic heart failure ([address] 10/20/2022 Yes    Severe thrombocytopenia ([address] 10/18/2022 Yes    Severe anemia 10/17/2022 Yes    Shoulder pain 10/19/2022 Yes    Hypervolemia 10/17/2022 Yes    MDS (myelodysplastic syndrome) ([address] 10/20/2022 Yes    Chronic systolic heart failure ([address] 10/18/2022 Yes    Arterial hypotension 10/17/2022 Yes    History of coronary artery disease 10/18/2022 Yes    Pulmonary vascular congestion 10/18/2022 Yes    Aspiration pneumonia of left lower lobe due to gastric secretions ([address] 10/18/2022 Yes    Acute renal failure ([address] 10/17/2022 Yes    Acute chest pain 10/18/2022 Yes    Type 2 diabetes mellitus ([address] 10/19/2022 Yes     : other supportive care :   - Check daily renal function panel with electrolytes-phosphorus  - Strict monitoring of I/Os, daily weight  - Renal feeds/diet  - Current medications reviewed. - Nephrotoxic medications have been discontinued. - Dose adjusted and appropriate.      - Dose meds for eGFR <15 mL/min/1.73m2 during ARUN    - Avoid heavy opioids due to renal failure - may use very low dose dilaudid / fentanyl with close monitoring of CNS and respiratory depression. Please refer to the orders. High Complexity. Multiple complex problems. Discussed with patient 's  treatment team-   Thank you for allowing me to participate in this patient's care. Please do not hesitate to contact me anytime. We will follow along with you. Benny Osuna MD,  [facility] [address] [place]: [phone] or Via [employer]  Fax: [phone]        =======================================================================================   =======================================================================================  Subjective / interval history:   Patient was seen comfortably sitting up  in the bed again   Reported no  active complaints,   Renal function stable  Na worsened While on IV lasix 20 QD.-Now better, with holding Lasix and more her oral hydration  Off of NC to RA       Past medical, Surgical, Social, Family medical history reviewed by me. MEDICATIONS: reviewed by me. Medications Prior to Admission:  No current facility-administered medications on file prior to encounter.      Current Outpatient Medications on File Prior to Encounter   Medication Sig Dispense Refill    empagliflozin (JARDIANCE) 10 MG tablet Take 1 tablet by mouth daily 30 tablet 3    insulin glargine (BASAGLAR KWIKPEN) 100 UNIT/ML injection pen Inject 48 Units into the skin daily 5 Adjustable Dose Pre-filled Pen Syringe 3    sacubitril-valsartan (ENTRESTO) 24-26 MG per tablet Take 0.5 tablets by mouth 2 times daily 60 tablet 1    furosemide (LASIX) 20 MG tablet Take 1 tablet by mouth daily 60 tablet 3    pantoprazole (PROTONIX) 40 MG tablet Take 40 mg by mouth every morning (before breakfast)      calcium carbonate (OSCAL) 500 MG TABS tablet Take 500 mg by mouth daily      gabapentin (NEURONTIN) 300 MG capsule Take 1 tablet in am, 2 tablets in pm. 90 capsule 3    senna (SENOKOT) 8.6 MG tablet Take 1 tablet by mouth 2 times daily      insulin aspart (NOVOLOG) 100 UNIT/ML injection vial Inject 15 Units into the skin 3 times daily      magnesium 30 MG tablet Take 40 mg by mouth daily      tiZANidine (ZANAFLEX) 2 MG tablet Take 2 mg by mouth every 8 hours as needed      metoprolol tartrate (LOPRESSOR) 25 MG tablet TAKE ONE-HALF TABLET BY MOUTH TWICE A DAY 90 tablet 4    atorvastatin (LIPITOR) 80 MG tablet Take 1 tablet by mouth daily 90 tablet 4    Insulin Pen Needle 32G X 6 MM MISC by Does not apply route 3 times daily with meals      hydrocortisone (ANUSOL-HC) 2.5 % rectal cream Place rectally 2 times daily Place rectally 2 times daily. rivaroxaban (XARELTO) 20 MG TABS tablet Take 1 tablet by mouth daily 90 tablet 3    vitamin D (ERGOCALCIFEROL) 79479 UNITS CAPS capsule Take 50,000 Units by mouth once a week      Omega 3 1000 MG CAPS Take 1,000 mg by mouth daily      Liraglutide (VICTOZA) 18 MG/3ML SOPN SC injection Inject 1.2 mg into the skin daily      lidocaine (LIDODERM) 5 % Place 1 patch onto the skin daily 12 hours on, 12 hours off. 30 patch 0    oxybutynin (DITROPAN XL) 15 MG CR tablet Take 15 mg by mouth daily. Loratadine 10 MG CAPS Take by mouth daily       levothyroxine (SYNTHROID) 175 MCG tablet Take 150 mcg by mouth daily.            Current Facility-Administered Medications:     [Held by provider] insulin lispro (HUMALOG) injection vial 0-16 Units, 0-16 Units, SubCUTAneous, TID WC, Adelina Camarena DO, 16 Units at 10/20/22 1209    [Held by provider] insulin lispro (HUMALOG) injection vial 0-4 Units, 0-4 Units, SubCUTAneous, Nightly, Adelina Camarena DO    [Held by provider] insulin glargine (LANTUS) injection vial 25 Units, 25 Units, SubCUTAneous, BID, Adelina Camarena DO, 25 Units at 10/20/22 0823    guaiFENesin [facility]) extended release tablet 600 mg, 600 mg, Oral, BID PRN, Adelina Camarena DO, 600 mg at 10/20/22 1046    insulin regular (HUMULIN R;NOVOLIN R) 100 Units in sodium chloride 0.9 % 100 mL infusion, 1-50 Units/hr, IntraVENous, Continuous, Dorethea Tania, DO, Stopped at 10/21/22 4680    digoxin (LANOXIN) tablet 125 mcg, 125 mcg, Oral, Daily, MATEUSZ Haider CNP, 125 mcg at 10/21/22 2852    glucose-vitamin C chewable tablet 4 tablet, 4 tablet, Oral, PRN, Joey Deng MD    dextrose bolus 10% 125 mL, 125 mL, IntraVENous, PRN **OR** dextrose bolus 10% 250 mL, 250 mL, IntraVENous, PRN, Joey Deng MD    glucagon (rDNA) injection 1 mg, 1 mg, SubCUTAneous, PRN, Joey Deng MD    dextrose 10 % infusion, , IntraVENous, Continuous PRN, Joey Deng MD    melatonin tablet 6 mg, 6 mg, Oral, Nightly PRN, Chelle Mccracken DO, 6 mg at 10/19/22 2009    cefepime (MAXIPIME) 1000 mg IVPB minibag, 1,000 mg, IntraVENous, Q12H, Bijal Mae MD, Stopped at 10/20/22 2327    0.9 % sodium chloride infusion, , IntraVENous, PRN, Bijal Mae MD    metronidazole (FLAGYL) 500 mg in 0.9% NaCl 100 mL IVPB premix, 500 mg, IntraVENous, Q8H, Maxine Jackson MD, Stopped at 10/21/22 0623    0.9 % sodium chloride infusion, , IntraVENous, PRN, MATEUSZ Irvin - CNP    methylPREDNISolone sodium (SOLU-MEDROL) injection 40 mg, 40 mg, IntraVENous, Daily, Ha Friend MD, 40 mg at 10/21/22 0613    ipratropium-albuterol (DUONEB) nebulizer solution 1 ampule, 1 ampule, Inhalation, Q4H PRN, Bryant Friend MD, 1 ampule at 10/17/22 0229    lidocaine viscous hcl (XYLOCAINE) 2 % solution 15 mL, 15 mL, Mouth/Throat, Q3H PRN, Ha Friend MD, 15 mL at 10/17/22 0150    ipratropium-albuterol (DUONEB) nebulizer solution 1 ampule, 1 ampule, Inhalation, 4x daily, Bijal Mae MD, 1 ampule at 10/21/22 0933    lidocaine 4 % external patch 1 patch, 1 patch, TransDERmal, Daily, Bijal Mae MD, 1 patch at 10/21/22 8975    benzonatate (TESSALON) capsule 100 mg, 100 mg, Oral, TID PRN, Beverley Mobley APRN - CNP, 100 mg at 10/16/22 0610    metoprolol tartrate (LOPRESSOR) tablet 12.5 mg, 12.5 mg, Oral, BID, Deepinder Faby Brigid, MD, 12.5 mg at 10/21/22 0923    0.9 % sodium chloride infusion, , IntraVENous, PRN, Bhaskar Lawson MD    acetaminophen (TYLENOL) tablet 650 mg, 650 mg, Oral, TID, MATEUSZ Davila - CNP, 650 mg at 10/21/22 1464    atorvastatin (LIPITOR) tablet 80 mg, 80 mg, Oral, Nightly, Donna Madrigal MD, 80 mg at 10/20/22 2020    hydrocortisone (ANUSOL-HC) 2.5 % rectal cream, , Rectal, BID, Donna Madrigal MD, Given at 10/21/22 2642    levothyroxine (SYNTHROID) tablet 150 mcg, 150 mcg, Oral, Daily, Donna Madrigal MD, 150 mcg at 10/21/22 1950    sodium chloride flush 0.9 % injection 5-40 mL, 5-40 mL, IntraVENous, 2 times per day, Donna Madrigal MD, 10 mL at 10/21/22 7123    sodium chloride flush 0.9 % injection 5-40 mL, 5-40 mL, IntraVENous, PRN, Donna Madrigal MD    0.9 % sodium chloride infusion, , IntraVENous, PRN, Donna Madrigal MD, Stopped at 10/20/22 2257    ondansetron (ZOFRAN-ODT) disintegrating tablet 4 mg, 4 mg, Oral, Q8H PRN **OR** ondansetron (ZOFRAN) injection 4 mg, 4 mg, IntraVENous, Q6H PRN, Donna Madrigal MD    polyethylene glycol (GLYCOLAX) packet 17 g, 17 g, Oral, Daily PRN, Donna Madrigal MD, 17 g at 10/11/22 1118    acetaminophen (TYLENOL) tablet 650 mg, 650 mg, Oral, Q6H PRN, 650 mg at 10/18/22 1802 **OR** acetaminophen (TYLENOL) suppository 650 mg, 650 mg, Rectal, Q6H PRN, Donna Madrigal MD    dextrose bolus 10% 125 mL, 125 mL, IntraVENous, PRN **OR** dextrose bolus 10% 250 mL, 250 mL, IntraVENous, PRN, Donna Madrigal MD    glucagon (rDNA) injection 1 mg, 1 mg, SubCUTAneous, PRN, Donna Madrigal MD    dextrose 10 % infusion, , IntraVENous, Continuous PRN, Donna Madrigal MD       REVIEW OF SYSTEMS:  As mentioned in chief complaint and HPI , Subjective             =======================================================================================     PHYSICAL EXAM:  Recent vital signs and recent I/Os reviewed by me.      Wt Readings from Last 3 Encounters:   10/21/22 241 lb 10 oz (109.6 kg)   10/07/22 241 lb 14.4 oz (109.7 kg)   06/21/22 243 lb 9.6 oz (110.5 kg)     BP Readings from Last 3 Encounters:   10/21/22 (!) 113/59   10/07/22 123/67   07/27/22 (!) 112/54     Patient Vitals for the past 24 hrs:   BP Temp Temp src Pulse Resp SpO2 Weight   10/21/22 0934 -- -- -- (!) 103 16 95 % --   10/21/22 0923 (!) 113/59 -- -- (!) 115 -- -- --   10/21/22 0812 (!) 113/59 97.2 °F (36.2 °C) Temporal 90 19 95 % --   10/21/22 0025 -- -- -- -- -- -- 241 lb 10 oz (109.6 kg)   10/21/22 0000 (!) 116/56 -- -- 99 18 -- --   10/20/22 2200 (!) 101/51 -- -- 89 17 -- --   10/20/22 2100 (!) 95/49 -- -- 95 22 -- --   10/20/22 2042 -- -- -- (!) 103 -- 96 % --   10/20/22 2020 109/74 -- -- 87 24 -- --   10/20/22 2017 109/74 97.4 °F (36.3 °C) Temporal 93 18 97 % --   10/20/22 1653 -- -- -- 93 18 96 % --   10/20/22 1620 (!) 114/51 97.6 °F (36.4 °C) Temporal 87 16 96 % --   10/20/22 1241 -- -- -- 93 19 96 % --   10/20/22 1200 112/66 97.6 °F (36.4 °C) Temporal 92 18 94 % --         Intake/Output Summary (Last 24 hours) at 10/21/2022 0938  Last data filed at 10/21/2022 0548  Gross per 24 hour   Intake 1061.72 ml   Output 350 ml   Net 711.72 ml              General: Awake, Alert, obese   HENT: Atraumatic, normocephalic   Eyes: Normal conjunctiva, Non-incteral sclera. Neck: Supple, JVD not visible. CVS:  Heart sounds are normal. No loud murmur. RS: Normal respiratory effort, Breat sound: diminished at bases. Abd: Soft , bowel sounds are normal, no distension and no tenderness . Skin: No rash , some bruises,   CNS: Awake Oriented , No focal.   Extremities/MSK:  Edema, no cyanosis.           =======================================================================================     DATA:  Diagnostic tests reviewed by me for today's visit:   (AS NEEDED FOR MY EVALUATION AND MANAGEMENT).        Recent Labs     10/19/22  0425 10/20/22  0530 10/21/22  0430   WBC 13.8* 15.0* 13.3*   HCT 22.6* 23.4* 22.8*   PLT 43* 31* 27*       Iron Saturation:  No components found for: PERCENTFE  FERRITIN:    Lab Results   Component Value Date/Time    FERRITIN 1,025.0 09/26/2022 04:34 AM     IRON:    Lab Results   Component Value Date/Time    IRON 46 09/25/2022 05:02 AM     TIBC:    Lab Results   Component Value Date/Time    TIBC 230 09/25/2022 05:02 AM       Recent Labs     10/18/22  1504 10/19/22  0425 10/19/22  0840 10/20/22  0530 10/21/22  0430    150* 148* 138 144   K 3.9 4.6  --  5.0 5.1    115*  --  105 112*   CO2 20* 23  --  22 22   BUN 76* 80*  --  95* 78*   CREATININE 1.9* 1.7*  --  1.5* 1.3*       Recent Labs     10/18/22  1504 10/19/22  0425 10/20/22  0530 10/21/22  0430   CALCIUM 8.6 8.7 8.7 8.7   MG 2.10  --   --   --        No results for input(s): PH, PCO2, PO2 in the last 72 hours.     Invalid input(s): Catherine Revering    ABG:  No results found for: PH, PCO2, PO2, HCO3, BE, THGB, TCO2, O2SAT  VBG:  No results found for: PHVEN, VZQ2VQN, BEVEN, E5GXQIFZ    LDH:    Lab Results   Component Value Date/Time     10/14/2022 04:50 PM     Uric Acid:    Lab Results   Component Value Date/Time    LABURIC 6.8 09/26/2022 04:34 AM       PT/INR:    Lab Results   Component Value Date/Time    PROTIME 23.6 09/29/2022 11:37 AM    INR 2.10 09/29/2022 11:37 AM     Warfarin PT/INR:  No components found for: PTPATWAR, PTINRWAR  PTT:    Lab Results   Component Value Date/Time    APTT 39.7 02/13/2017 12:07 PM   [APTT}  Last 3 Troponin:    Lab Results   Component Value Date/Time    TROPONINI 0.05 10/11/2022 05:09 AM    TROPONINI 0.04 10/10/2022 04:13 PM    TROPONINI 0.04 10/10/2022 11:20 AM       U/A:    Lab Results   Component Value Date/Time    COLORU Yellow 10/17/2022 07:24 PM    PROTEINU 100 10/17/2022 07:24 PM    PHUR 5.0 10/17/2022 07:24 PM    WBCUA 19 10/17/2022 07:24 PM    RBCUA 275 10/17/2022 07:24 PM    MUCUS 1+ 05/16/2014 11:50 PM    BACTERIA None Seen 10/17/2022 07:24 PM    CLARITYU TURBID 10/17/2022 07:24 PM SPECGRAV 1.020 10/17/2022 07:24 PM    LEUKOCYTESUR TRACE 10/17/2022 07:24 PM    UROBILINOGEN 0.2 10/17/2022 07:24 PM    BILIRUBINUR Negative 10/17/2022 07:24 PM    BILIRUBINUR NEGATIVE 03/09/2012 06:51 AM    BLOODU LARGE 10/17/2022 07:24 PM    GLUCOSEU 250 10/17/2022 07:24 PM    GLUCOSEU NEGATIVE 03/09/2012 06:51 AM     Microalbumen/Creatinine ratio:  No components found for: RUCREAT  24 Hour Urine for Protein:  No components found for: RAWUPRO, UHRS3, AVHB60KZ, UTV3  24 Hour Urine for Creatinine Clearance:  No components found for: CREAT4, UHRS10, UTV10  Urine Toxicology:  No components found for: Hezzie Martini, IBENZO, ICOCAINE, IMARTHC, IOPIATES, IPHENCYC    HgBA1c:    Lab Results   Component Value Date/Time    LABA1C 7.5 10/18/2022 04:37 AM     RPR:  No results found for: RPR  HIV:  No results found for: HIV  NICOLASA:  No results found for: ANATITER, NICOLASA  RF:  No results found for: RF  DSDNA:  No components found for: DNA  AMYLASE:    Lab Results   Component Value Date/Time    AMYLASE 50 05/09/2014 07:45 PM     LIPASE:    Lab Results   Component Value Date/Time    LIPASE 37.0 05/09/2014 07:45 PM     Fibrinogen Level:  No components found for: FIB       BELOW MENTIONED RADIOLOGY STUDY RESULTS BY ME (AS NEEDED FOR MY EVALUATION AND MANAGEMENT). XR CHEST (2 VW)    Result Date: 9/24/2022  EXAMINATION: TWO XRAY VIEWS OF THE CHEST 9/24/2022 6:35 pm COMPARISON: 06/16/2022 HISTORY: ORDERING SYSTEM PROVIDED HISTORY: Chest Discomfort TECHNOLOGIST PROVIDED HISTORY: Reason for exam:->Chest Discomfort Reason for Exam: Wrist Pain ([place] # [id number] -Patient brought in by squad from home with left wrist, right rib pain. No injury. ) FINDINGS: The heart is mildly enlarged and less prominent the pulmonary vessels are engorged centrally and less prominent. The lungs are hyperinflated. No consolidation or effusion is seen. The bones are intact. There are postop changes along the mediastinum and sternum which is unchanged. There are mild subsegmental linear densities scattered along the lung bases which is less prominent. Mild cardiomegaly with mild central pulmonary congestion or pulmonary artery hypertension which is less prominent. Mild chronic obstructive lung changes with mild bibasilar discoid atelectasis or scarring which is less prominent with no obvious infiltrate or effusion. XR WRIST LEFT (MIN 3 VIEWS)    Result Date: 9/24/2022  EXAMINATION: 3 XRAY VIEWS OF THE LEFT WRIST 9/24/2022 6:35 pm COMPARISON: None. HISTORY: ORDERING SYSTEM PROVIDED HISTORY: pain TECHNOLOGIST PROVIDED HISTORY: Reason for exam:->pain FINDINGS: There is mild narrowing of the radiocarpal joint. No acute fracture or dislocation is seen. There is moderate narrowing along the base of the thumb with prominent osteophytes throughout the joint. There is some linear calcifications just distal to the ulna which probably within the TFCC. There are vascular calcifications along the palmar aspect of the wrist.  There is mild soft tissue swelling along the dorsum of the wrist.  The bones are osteopenic. No obvious acute fracture or dislocation can be seen. Mild osteoarthritic changes along the radiocarpal joint and moderate degenerative arthritic changes along the base of the thumb with no acute bony abnormality seen. Vascular calcifications along the palmar aspect of the wrist Mild soft tissue swelling around the wrist and diffuse osteopenia. Probable mild chondrocalcinosis of the TFCC. CT HEAD WO CONTRAST    Result Date: 10/14/2022  EXAMINATION: CT OF THE HEAD WITHOUT CONTRAST  10/13/2022 2:52 pm TECHNIQUE: CT of the head was performed without the administration of intravenous contrast. Automated exposure control, iterative reconstruction, and/or weight based adjustment of the mA/kV was utilized to reduce the radiation dose to as low as reasonably achievable.  COMPARISON: 02/11/2012 HISTORY: ORDERING SYSTEM PROVIDED HISTORY: dizziness 37 mm and descending thoracic aorta 25 mm. Status post CABG. Cardiomegaly. The great vessels appear unremarkable with exception of calcific atherosclerotic disease. Moderate to severe calcific atherosclerosis coronary arteries. No pericardial effusion. Posterior mediastinal structures appear unremarkable. No mediastinal or hilar adenopathy. Small hiatal hernia with what appears to be a small retained capsule within the hiatal hernia. Lungs/pleura: No focal pulmonary infiltrate evident. No soft tissue or ground-glass pulmonary nodule is seen. No inspissated secretions or endobronchial lesion evident. No pleural effusion or pneumothorax. Upper Abdomen: Unremarkable appearance. Soft Tissues/Bones: No acute superficial soft tissue or osseous structure abnormality evident. Old healed fractures left ribs 4, 5 and 6.     1. No definite acute pulmonary disease. Evaluation of the lungs degraded because of motion during imaging, blurring detail and resulting in misregistration artifact. 2. Moderate to severe calcific atherosclerosis coronary arteries. 3. Mild main pulmonary artery dilatation can be seen with pulmonary hypertension but is nonspecific. 4. Cardiomegaly and sequela from CABG. 5. Small hiatal hernia with what appears to be a small retained capsule within the hiatal hernia. CT GUIDED NEEDLE PLACEMENT    Result Date: 9/29/2022  PROCEDURE: CT GUIDED BONE MARROW ASPIRATION AND CORE NEEDLE BONE BIOPSY OF THE RIGHT ILIAC BONE. MODERATE CONSCIOUS SEDATION 9/29/2022 HISTORY: ORDERING SYSTEM PROVIDED HISTORY: anemia, thrombocytopenia TECHNOLOGIST PROVIDED HISTORY: Reason for exam:->anemia, thrombocytopenia Reason for Exam: anemia, thrombocytopenia SEDATION: 2 mgversed and 50 mcg fentanyl were titrated intravenously for moderate sedation monitored under my direction. Total intraservice time of sedation was 5 minutes.   The patient's vital signs were monitored throughout the procedure and recorded in the silhouette with associated vascular congestion. No focal consolidation. Costophrenic angles are sharp. No evidence of pneumothorax. No acute osseous abnormalities. 1. Similar-appearing prominence of the cardiac silhouette with associated vascular congestion. 2. No focal consolidation. XR CHEST PORTABLE    Result Date: 10/10/2022  EXAMINATION: ONE XRAY VIEW OF THE CHEST 10/10/2022 11:15 am COMPARISON: Chest x-ray dated 09/24/2022. HISTORY: ORDERING SYSTEM PROVIDED HISTORY: left chest pain TECHNOLOGIST PROVIDED HISTORY: Reason for exam:->left chest pain Reason for Exam: Chest Pain (Pt transported from [facility] from [facility]. PT c/o chest pain radiating to left shoulder. Symptoms started 2 days ago. Pt stated she was seen in [facility] and was inpatient for 2 weeks for left shoulder complications.) FINDINGS: HEART/MEDIASTINUM: The cardiomediastinal silhouette is stable. PLEURA/LUNGS: There are no focal consolidations or pleural effusions. There is no appreciable pneumothorax. Linear atelectasis versus scarring noted in the periphery of the left mid lung. BONES/SOFT TISSUE: No acute abnormality. Median sternotomy wires again noted. No radiographic evidence of acute pulmonary disease. CT CHEST PULMONARY EMBOLISM W CONTRAST    Result Date: 10/10/2022  EXAMINATION: CTA OF THE CHEST 10/10/2022 11:59 am TECHNIQUE: CTA of the chest was performed after the administration of intravenous contrast.  Multiplanar reformatted images are provided for review. MIP images are provided for review. Automated exposure control, iterative reconstruction, and/or weight based adjustment of the mA/kV was utilized to reduce the radiation dose to as low as reasonably achievable. COMPARISON: None.  HISTORY: ORDERING SYSTEM PROVIDED HISTORY: left sided chest pain, shortness of breath TECHNOLOGIST PROVIDED HISTORY: Reason for exam:->left sided chest pain, shortness of breath Decision Support Exception - unselect if not a suspected or confirmed emergency medical condition->Emergency Medical Condition (MA) Reason for Exam: left sided chest pain, shortness of breath FINDINGS: Pulmonary Arteries: Pulmonary arteries are adequately opacified for evaluation. No evidence of intraluminal filling defect to suggest pulmonary embolism. Main pulmonary artery is normal in caliber. Mediastinum: No evidence of mediastinal lymphadenopathy. The heart and pericardium demonstrate no acute abnormality. There is no acute abnormality of the thoracic aorta. Lungs/pleura: There is minimal bibasilar atelectasis/scarring. The lungs are without acute process. No focal consolidation or pulmonary edema. No evidence of pleural effusion or pneumothorax. Upper Abdomen: Limited images of the upper abdomen are unremarkable. Soft Tissues/Bones: No acute bone or soft tissue abnormality. No evidence of pulmonary embolism or acute pulmonary abnormality. CT BIOPSY BONE MARROW    Result Date: 9/29/2022  PROCEDURE: CT GUIDED BONE MARROW ASPIRATION AND CORE NEEDLE BONE BIOPSY OF THE RIGHT ILIAC BONE. MODERATE CONSCIOUS SEDATION 9/29/2022 HISTORY: ORDERING SYSTEM PROVIDED HISTORY: anemia, thrombocytopenia TECHNOLOGIST PROVIDED HISTORY: Reason for exam:->anemia, thrombocytopenia Reason for Exam: anemia, thrombocytopenia SEDATION: 2 mgversed and 50 mcg fentanyl were titrated intravenously for moderate sedation monitored under my direction. Total intraservice time of sedation was 5 minutes. The patient's vital signs were monitored throughout the procedure and recorded in the patient's medical record by the nurse. TECHNIQUE: Informed consent was obtained following a detailed explanation of the procedure including risks, benefits, and alternatives. Universal protocol was followed. Sterile gowns, masks, hats and gloves utilized for maximal sterile barrier.  Axial images were obtained through the iliac bones using CT guidance and a suitable skin site was prepped and draped in sterile fashion. Local anesthesia was achieved with lidocaine. An 11 gauge [employer] bone marrow biopsy needle was advanced into the right iliac bone and approximately 15 mL of bone marrow aspirate was obtained. A single core biopsy specimen was obtained and the patient tolerated the procedure well. Estimated blood loss: Less than 5 cc Automated exposure control, iterative reconstruction, and/or weight based adjustment of the mA/kV was utilized to reduce the radiation dose to as low as reasonably achievable. DLP: 71.66 mGy-cm     Successful CT guided bone marrow aspiration and core biopsy of the right iliac bone. EGD    Result Date: 10/3/2022  No dictation             This report was transcribed using voice recognition software, mainly. So please excuse brevity and/or typos. Every effort was made to ensure accuracy, however, inadvertent computerized transcription errors may be present. Please contact us, if any questions or clarifications are needed. 72.6

## 2025-03-14 NOTE — H&P ADULT - NSICDXPASTMEDICALHX_GEN_ALL_CORE_FT
PAST MEDICAL HISTORY:  Anxiety and depression     Atrial fibrillation persistent in 2022    GERD (gastroesophageal reflux disease)     H/O constipation     H/O fibromyalgia     HTN (Hypertension)     Latex allergy     Migraines     Morbid obesity due to excess calories     Muscle Cramps at Night     HARIS (obstructive sleep apnea) noncompliant with CPAP    Overactive bladder     Patient is Bahai refuse blood products    Pericarditis around age 30's    Primary osteoarthritis of right knee

## 2025-03-14 NOTE — H&P ADULT - PROBLEM SELECTOR PLAN 1
UA positive for UTI. However patient not currently endorsing urinary symptoms, though she does take oxybutynin.   - Will treat with course of ceftriaxone 1g q24h for 3 days  - Hold oxybutynin for now given UTI and possible anticholinergic effects may explain some of patient's symptoms (confusion, headache, drowsiness)  - F/u urine culture  - CTH without acute findings, if mental status worsens, consider MRI

## 2025-03-14 NOTE — ED ADULT TRIAGE NOTE - PAIN: PRESENCE, MLM
Jody Richardson was seen and treated in our emergency department on 12/23/2024.  She may return to work on 12/30/2024.       If you have any questions or concerns, please don't hesitate to call.      Archie Castañeda, KIERA-CNP denies pain/discomfort (Rating = 0)

## 2025-03-14 NOTE — ED PROVIDER NOTE - PHYSICAL EXAMINATION
GEN: NAD. A&Ox3. Non-toxic appearing.  HEENT: normocephalic, atraumatic, EOMI, PERRL, moist MM  CARDIAC: RRR, S1, S2, no murmur. Radial pulses present and symmetric b/l  PULM: CTA B/L no wheeze, rhonchi, rales.   ABD: soft NT, ND, no rebound no guarding,  MSK: Moving all extremities, no edema.  NEURO: slight left nasolabial droop, questionable receptive aphasia  SKIN: warm, dry, no rash.

## 2025-03-14 NOTE — ED ADULT TRIAGE NOTE - CHIEF COMPLAINT QUOTE
short term memory loss, dizziness, balance problem for one month  sent in by cardiologist office to r/o stroke

## 2025-03-14 NOTE — H&P ADULT - HISTORY OF PRESENT ILLNESS
75 year old female, Christianity, with hx of Atrial fibrillation, on eliquis s/p ablation 3/2022, HTN, multiple prior strokes without residual deficits, HARIS with CPAP, right knee replacement, chiari malformation type 1, b/l cataracts presenting with multiple complaints, but primarily for confusion.  75 year old female, [employer], with hx of Atrial fibrillation, on eliquis s/p ablation 3/2022, HTN, multiple prior strokes without residual deficits, HARIS with CPAP, right knee replacement, chiari malformation type 1, b/l cataracts presenting with multiple complaints, including headache, confusion, generalized weakness, high blood pressure. Pt accompanied by  who provided history. For the last few days, patient has been feeling unwell; she felt tired and lethargic. She followed up with her cardiologist and was noted to be hypertensive. Pt also was feeling lightheaded and nausea at that time. Pt was urged to come to hospital for further evaluation. Pt denied any chest pain, SOB, fevers, chills,  endorsed intermittent cough, primarily at night. Also notes that she was diagnosed with early dementia by her neurologist several months ago. She has trouble with her short term memory but will sometimes bring up events from the distant past. She denies any urinary symptoms. No abd pain, constipation, or diarrhea. Pt currently AAOx3 and able to answer questions appropriately.     In the ED, CT imaging of head without acute findings. Pt evaluated by neurology who felt symptoms were metabolic/ due to UTI. Pt given a dose of ceftriaxone in ED. Admitted for further management.

## 2025-03-14 NOTE — ED PROVIDER NOTE - NS_BEDUNITTYPES_ED_ALL_ED
[facility] - Surgery ([place])  Brief Operative Note    Surgery Date: 2/2/2022     Surgeon(s) and Role:     * David Kilgore MD - Primary    Assisting Surgeon: None    Pre-op Diagnosis:  Hx of breast cancer [Z85.3]    Post-op Diagnosis:  Post-Op Diagnosis Codes:     * Hx of breast cancer [Z85.3]    Procedure(s) (LRB):  REVISION, FLAP, PREVIOUSLY CREATED FOR BREAST RECONSTRUCTION FAT GRAFTING TO BOTH BREAST (Bilateral)    Anesthesia: General    Operative Findings:   R fat transfer 450 cc  L fat transfer 350 cc    Estimated Blood Loss: Minimal          Specimens:   Specimen (24h ago, onward)            None            Discharge Note    OUTCOME: Patient tolerated treatment/procedure well without complication and is now ready for discharge.    DISPOSITION: Home or Self Care    FINAL DIAGNOSIS:  Lichen planopilaris    FOLLOWUP: In clinic    DISCHARGE INSTRUCTIONS:    Discharge Procedure Orders   Diet general     Wear Surgical Bra and/or Girdle at all times (may remove for short times to shower)   Order Comments: Wear Surgical Bra and/or Girdle at all times (may remove for short times to shower)     Discharge instructions (Specify)   Order Comments: Okay to shower tomorrow, no tub soaking     Call MD for:  temperature >100.4     Call MD for:  persistent nausea and vomiting     Call MD for:  severe uncontrolled pain     Call MD for:  difficulty breathing, headache or visual disturbances     Call MD for:  redness, tenderness, or signs of infection (pain, swelling, redness, odor or green/yellow discharge around incision site)     Call MD for:  hives     Activity as tolerated     
TELEMETRY

## 2025-03-14 NOTE — ED PROVIDER NOTE - ATTENDING CONTRIBUTION TO CARE
75-year-old female who has history of A-fib on Eliquis, hypertension presents to the emergency department with "babbling that occurred yesterday.  Patient has no chest pain shortness of breath arm or leg weakness or numbness.  She has been last normal approximately 3 weeks ago.  Patient has followed with a neurologist in [place] approximately a year and a half ago and was told she had early signs of dementia.  Patient has been developing intermittent speech disturbance and shaking.  She initially attributed it to low blood sugar however her symptoms have been recurrent.  On exam patient is accompanied by her  and they saw her physician today who recommended hospital stay on exam patient is awake alert oriented x 3 has clear lungs to auscultation bilaterally has 5 out of 5 upper and lower extremity strength has no dysmetria but has a mild tremor patient has no facial droop she has ANO x 3.  Patient has no CT or L-spine tenderness abdomen soft patient will have labs imaging for possible subacute stroke and neurology consultation also in differential is delirium versus dementia

## 2025-03-14 NOTE — ED PROVIDER NOTE - NSICDXPASTMEDICALHX_GEN_ALL_CORE_FT
PAST MEDICAL HISTORY:  Anxiety and depression     Atrial fibrillation persistent in 2022    GERD (gastroesophageal reflux disease)     H/O constipation     H/O fibromyalgia     HTN (Hypertension)     Latex allergy     Migraines     Morbid obesity due to excess calories     Muscle Cramps at Night     HARIS (obstructive sleep apnea) noncompliant with CPAP    Overactive bladder     Patient is Faith refuse blood products    Pericarditis around age 30's    Primary osteoarthritis of right knee

## 2025-03-14 NOTE — H&P ADULT - NSHPLABSRESULTS_GEN_ALL_CORE
133[L]  |  97  |  16  ----------------------------<  124[H]  4.4   |  21[L]  |  0.83    Ca    9.4      14 Mar 2025 12:47    TPro  7.5  /  Alb  4.0  /  TBili  0.4  /  DBili  x   /  AST  22  /  ALT  18  /  AlkPhos  91            PT/INR - ( 14 Mar 2025 12:47 )   PT: 16.8 sec;   INR: 1.48 ratio         PTT - ( 14 Mar 2025 12:47 )  PTT:38.6 sec              Urinalysis Basic - ( 14 Mar 2025 15:25 )    Color: Yellow / Appearance: Clear / S.010 / pH: x  Gluc: x / Ketone: Negative mg/dL  / Bili: Negative / Urobili: 0.2 mg/dL   Blood: x / Protein: Negative mg/dL / Nitrite: Positive   Leuk Esterase: Moderate / RBC: 0 /HPF / WBC 13 /HPF   Sq Epi: x / Non Sq Epi: 1 /HPF / Bacteria: Many /HPF                              12.5   10.72 )-----------( 299      ( 14 Mar 2025 12:47 )             38.7     CAPILLARY BLOOD GLUCOSE        Blood Gas Source Venous: Venous (25 @ 12:29)     133[L]  |  97  |  16  ----------------------------<  124[H]  4.4   |  21[L]  |  0.83    Ca    9.4      14 Mar 2025 12:47    TPro  7.5  /  Alb  4.0  /  TBili  0.4  /  DBili  x   /  AST  22  /  ALT  18  /  AlkPhos  91        PT/INR - ( 14 Mar 2025 12:47 )   PT: 16.8 sec;   INR: 1.48 ratio         PTT - ( 14 Mar 2025 12:47 )  PTT:38.6 sec              Urinalysis Basic - ( 14 Mar 2025 15:25 )    Color: Yellow / Appearance: Clear / S.010 / pH: x  Gluc: x / Ketone: Negative mg/dL  / Bili: Negative / Urobili: 0.2 mg/dL   Blood: x / Protein: Negative mg/dL / Nitrite: Positive   Leuk Esterase: Moderate / RBC: 0 /HPF / WBC 13 /HPF   Sq Epi: x / Non Sq Epi: 1 /HPF / Bacteria: Many /HPF                          12.5   10.72 )-----------( 299      ( 14 Mar 2025 12:47 )             38.7     CAPILLARY BLOOD GLUCOSE    Blood Gas Source Venous: Venous (25 @ 12:29)    IMAGING    < from: CT Head No Cont (25 @ 15:11) >      IMPRESSION:    CT HEAD:  No acute intracranial hemorrhage, mass effect, or midline shift. Moderate   chronic microvascular ischemic changes.    CTA NECK:  Calcified atherosclerotic plaque at the carotid bifurcations bilaterally.   There is associated moderate stenosis of the proximal right internal   carotid artery and mild to moderate stenosis of the proximal left   internal carotid artery.    CTA HEAD:  No large vesselocclusion, significant stenosis or vascular abnormality   identified.    < end of copied text >

## 2025-03-15 LAB
AMMONIA BLD-MCNC: 28 UMOL/L — SIGNIFICANT CHANGE UP (ref 11–55)
ANION GAP SERPL CALC-SCNC: 15 MMOL/L — SIGNIFICANT CHANGE UP (ref 5–17)
B BURGDOR C6 AB SER-ACNC: NEGATIVE — SIGNIFICANT CHANGE UP
B BURGDOR IGG+IGM SER-ACNC: 0.39 INDEX — SIGNIFICANT CHANGE UP (ref 0.01–0.9)
BASOPHILS # BLD AUTO: 0.06 K/UL — SIGNIFICANT CHANGE UP (ref 0–0.2)
BASOPHILS NFR BLD AUTO: 0.6 % — SIGNIFICANT CHANGE UP (ref 0–2)
BUN SERPL-MCNC: 11 MG/DL — SIGNIFICANT CHANGE UP (ref 7–23)
CALCIUM SERPL-MCNC: 9 MG/DL — SIGNIFICANT CHANGE UP (ref 8.4–10.5)
CHLORIDE SERPL-SCNC: 101 MMOL/L — SIGNIFICANT CHANGE UP (ref 96–108)
CO2 SERPL-SCNC: 21 MMOL/L — LOW (ref 22–31)
CREAT SERPL-MCNC: 0.8 MG/DL — SIGNIFICANT CHANGE UP (ref 0.5–1.3)
EGFR: 76 ML/MIN/1.73M2 — SIGNIFICANT CHANGE UP
EGFR: 76 ML/MIN/1.73M2 — SIGNIFICANT CHANGE UP
EOSINOPHIL # BLD AUTO: 0.22 K/UL — SIGNIFICANT CHANGE UP (ref 0–0.5)
EOSINOPHIL NFR BLD AUTO: 2.3 % — SIGNIFICANT CHANGE UP (ref 0–6)
FOLATE SERPL-MCNC: >20 NG/ML — SIGNIFICANT CHANGE UP
GLUCOSE SERPL-MCNC: 91 MG/DL — SIGNIFICANT CHANGE UP (ref 70–99)
HCT VFR BLD CALC: 35.7 % — SIGNIFICANT CHANGE UP (ref 34.5–45)
HGB BLD-MCNC: 11.7 G/DL — SIGNIFICANT CHANGE UP (ref 11.5–15.5)
IMM GRANULOCYTES NFR BLD AUTO: 0.3 % — SIGNIFICANT CHANGE UP (ref 0–0.9)
LYMPHOCYTES # BLD AUTO: 2.45 K/UL — SIGNIFICANT CHANGE UP (ref 1–3.3)
LYMPHOCYTES # BLD AUTO: 25.5 % — SIGNIFICANT CHANGE UP (ref 13–44)
MCHC RBC-ENTMCNC: 28.9 PG — SIGNIFICANT CHANGE UP (ref 27–34)
MCHC RBC-ENTMCNC: 32.8 G/DL — SIGNIFICANT CHANGE UP (ref 32–36)
MCV RBC AUTO: 88.1 FL — SIGNIFICANT CHANGE UP (ref 80–100)
MONOCYTES # BLD AUTO: 1.04 K/UL — HIGH (ref 0–0.9)
MONOCYTES NFR BLD AUTO: 10.8 % — SIGNIFICANT CHANGE UP (ref 2–14)
NEUTROPHILS # BLD AUTO: 5.82 K/UL — SIGNIFICANT CHANGE UP (ref 1.8–7.4)
NEUTROPHILS NFR BLD AUTO: 60.5 % — SIGNIFICANT CHANGE UP (ref 43–77)
NRBC BLD AUTO-RTO: 0 /100 WBCS — SIGNIFICANT CHANGE UP (ref 0–0)
PLATELET # BLD AUTO: 280 K/UL — SIGNIFICANT CHANGE UP (ref 150–400)
POTASSIUM SERPL-MCNC: 3.8 MMOL/L — SIGNIFICANT CHANGE UP (ref 3.5–5.3)
POTASSIUM SERPL-SCNC: 3.8 MMOL/L — SIGNIFICANT CHANGE UP (ref 3.5–5.3)
RBC # BLD: 4.05 M/UL — SIGNIFICANT CHANGE UP (ref 3.8–5.2)
RBC # FLD: 13.2 % — SIGNIFICANT CHANGE UP (ref 10.3–14.5)
SODIUM SERPL-SCNC: 137 MMOL/L — SIGNIFICANT CHANGE UP (ref 135–145)
T PALLIDUM AB TITR SER: NEGATIVE — SIGNIFICANT CHANGE UP
T3 SERPL-MCNC: 103 NG/DL — SIGNIFICANT CHANGE UP (ref 80–200)
T4 AB SER-ACNC: 10.3 UG/DL — SIGNIFICANT CHANGE UP (ref 4.6–12)
TSH SERPL-MCNC: 1.68 UIU/ML — SIGNIFICANT CHANGE UP (ref 0.27–4.2)
VIT B12 SERPL-MCNC: 773 PG/ML — SIGNIFICANT CHANGE UP (ref 232–1245)
WBC # BLD: 9.62 K/UL — SIGNIFICANT CHANGE UP (ref 3.8–10.5)
WBC # FLD AUTO: 9.62 K/UL — SIGNIFICANT CHANGE UP (ref 3.8–10.5)

## 2025-03-15 PROCEDURE — 99223 1ST HOSP IP/OBS HIGH 75: CPT | Mod: GC

## 2025-03-15 PROCEDURE — 99223 1ST HOSP IP/OBS HIGH 75: CPT

## 2025-03-15 PROCEDURE — 99232 SBSQ HOSP IP/OBS MODERATE 35: CPT

## 2025-03-15 PROCEDURE — 99497 ADVNCD CARE PLAN 30 MIN: CPT | Mod: 25

## 2025-03-15 RX ORDER — DILTIAZEM HYDROCHLORIDE 240 MG/1
2.5 TABLET, EXTENDED RELEASE ORAL ONCE
Refills: 0 | Status: COMPLETED | OUTPATIENT
Start: 2025-03-15 | End: 2025-03-15

## 2025-03-15 RX ORDER — DILTIAZEM HYDROCHLORIDE 240 MG/1
30 TABLET, EXTENDED RELEASE ORAL ONCE
Refills: 0 | Status: COMPLETED | OUTPATIENT
Start: 2025-03-15 | End: 2025-03-15

## 2025-03-15 RX ORDER — DILTIAZEM HYDROCHLORIDE 240 MG/1
240 TABLET, EXTENDED RELEASE ORAL DAILY
Refills: 0 | Status: DISCONTINUED | OUTPATIENT
Start: 2025-03-15 | End: 2025-03-17

## 2025-03-15 RX ADMIN — BUPROPION HYDROBROMIDE 300 MILLIGRAM(S): 522 TABLET, EXTENDED RELEASE ORAL at 11:50

## 2025-03-15 RX ADMIN — LOSARTAN POTASSIUM 100 MILLIGRAM(S): 100 TABLET, FILM COATED ORAL at 05:14

## 2025-03-15 RX ADMIN — DILTIAZEM HYDROCHLORIDE 30 MILLIGRAM(S): 240 TABLET, EXTENDED RELEASE ORAL at 14:15

## 2025-03-15 RX ADMIN — DILTIAZEM HYDROCHLORIDE 2.5 MILLIGRAM(S): 240 TABLET, EXTENDED RELEASE ORAL at 12:15

## 2025-03-15 RX ADMIN — Medication 40 MILLIGRAM(S): at 05:14

## 2025-03-15 RX ADMIN — ROSUVASTATIN CALCIUM 5 MILLIGRAM(S): 20 TABLET, FILM COATED ORAL at 21:19

## 2025-03-15 RX ADMIN — DILTIAZEM HYDROCHLORIDE 2.5 MILLIGRAM(S): 240 TABLET, EXTENDED RELEASE ORAL at 11:47

## 2025-03-15 RX ADMIN — APIXABAN 5 MILLIGRAM(S): 2.5 TABLET, FILM COATED ORAL at 17:15

## 2025-03-15 RX ADMIN — DILTIAZEM HYDROCHLORIDE 180 MILLIGRAM(S): 240 TABLET, EXTENDED RELEASE ORAL at 05:14

## 2025-03-15 RX ADMIN — CEFTRIAXONE 100 MILLIGRAM(S): 500 INJECTION, POWDER, FOR SOLUTION INTRAMUSCULAR; INTRAVENOUS at 17:14

## 2025-03-15 RX ADMIN — DILTIAZEM HYDROCHLORIDE 30 MILLIGRAM(S): 240 TABLET, EXTENDED RELEASE ORAL at 18:27

## 2025-03-15 RX ADMIN — APIXABAN 5 MILLIGRAM(S): 2.5 TABLET, FILM COATED ORAL at 05:14

## 2025-03-15 NOTE — PROGRESS NOTE ADULT - PROBLEM SELECTOR PLAN 3
- EKG NSR, HR 67 in ED, however pt w/episodes of A-fib w/RVR on tele to 130s/140s today  - May be contributing to sensation of lightheadedness/dizziness she has been feeling  - Does f/u with cardiologist (Earnest Corley MD) outpatient, w/hx of ablation 3/2022 for A-fib   - C/w diltaizem 180 mg qd, given 2 additional pushes 2.5 mg IV, cardiology consulted given persistent symptomatic tachycardia to 130s, advised additional 30 mg PO  - C/w Eliquis 5 mg BID

## 2025-03-15 NOTE — DIETITIAN INITIAL EVALUATION ADULT - OTHER INFO
- Wt hx:         - UBW: unsure per pt; reports she can not remember / has been having "memory problems."          - Dosing wt: 160 pounds (3/04)         - No new wts to assess per chart at this time; No wt hx per [facility] available at this time.           - RD to continue to monitor weight trends as able.   - Nutritionally Pertinent Meds in-house: Abx, PPI.    - Neuro:  	- following for memory / speech issues.

## 2025-03-15 NOTE — CONSULT NOTE ADULT - NSCONSULTADDITIONALINFOA_GEN_ALL_CORE
-advanced care planning was discussed with patient and family.  Advanced care planning forms were reviewed and discussed.

## 2025-03-15 NOTE — PROGRESS NOTE ADULT - PROBLEM SELECTOR PLAN 2
Elevated BP   - Continue losartan 100mg qd, on diltiazem 180 mg for A-fib as well   - Monitor BP  - Can consider addition of a diuretic for additional BP control which may also help LE edema

## 2025-03-15 NOTE — CONSULT NOTE ADULT - ASSESSMENT
75F with a fib on eliquis p/w multiple medical complaints notably worsening of memory and speech in last 3 days. Found to have UTI. Neuroimaging neg for acute pathology. Has some athero in carotids. Neuro exam nonfocal.     Impression: Acute speech and memory changes likely toxic metabolic etiology    Recommendations:   Admit to medicine   Treatment for UTI and rest of toxic metabolic issues   If does not improve with treatment, can consider mri brain w/o   check TSH, T3/T4, RPR, vitamin B1, B6, B12, folate, ammonia, Cu, Zn, Vit D 25-OH, WNV abs, Lyme Abs     To be seen by neurology attending 
75F w/ PMH of AF s/p ablation, on Eliquis, HTN, multiple CVAs, here for UTI and cardiology consulted for HTN and AF management     -AF w/ RVR triggered by underlying UTI   -increase Cardizem CD to 240 mg QD   -add Metoprolol 25 mg TID with hold parameters (hold if SBP<90 or HR<60)   -IV metoprolol 5 mg PRN for HR>130 persistently   -if further rate control needed, consider IV digoxin load   -cont losartan 100 mg qd  -cont eliquis for AC   -check TTE ECHO   -monitor on tele   -monitor maude Wood MD [facility]  Attending Interventional Cardiologist, Nora-NS/ROMAN.   Avaliable on [employer] Team

## 2025-03-15 NOTE — DIETITIAN INITIAL EVALUATION ADULT - PERTINENT MEDS FT
MEDICATIONS  (STANDING):  apixaban 5 milliGRAM(s) Oral every 12 hours  buPROPion XL (24-Hour) . 300 milliGRAM(s) Oral daily  cefTRIAXone   IVPB 1000 milliGRAM(s) IV Intermittent every 24 hours  diltiazem    milliGRAM(s) Oral daily  influenza  Vaccine (HIGH DOSE) 0.5 milliLiter(s) IntraMuscular once  losartan 100 milliGRAM(s) Oral daily  pantoprazole    Tablet 40 milliGRAM(s) Oral before breakfast  rosuvastatin 5 milliGRAM(s) Oral at bedtime    MEDICATIONS  (PRN):  acetaminophen     Tablet .. 650 milliGRAM(s) Oral every 6 hours PRN Temp greater or equal to 38C (100.4F), Mild Pain (1 - 3)  ondansetron Injectable 4 milliGRAM(s) IV Push every 8 hours PRN Nausea and/or Vomiting

## 2025-03-15 NOTE — DIETITIAN INITIAL EVALUATION ADULT - ADD RECOMMEND
1) Recommend liberalize to Regular diet free of therapeutic restrictions as tolerated.   	- Defer texture/consistency to SLP/team.   2) Recommend Multivitamin daily pending no medical contraindications.  3) Continue to monitor PO intake, weight, labs, skin, GI status, and diet.

## 2025-03-15 NOTE — PROVIDER CONTACT NOTE (OTHER) - BACKGROUND
Pt admitted for UTI and acute speech/memory changes.
Pt admitted for uti and acute speech/memory changes

## 2025-03-15 NOTE — DIETITIAN INITIAL EVALUATION ADULT - PERTINENT LABORATORY DATA
03-15    137  |  101  |  11  ----------------------------<  91  3.8   |  21[L]  |  0.80    Ca    9.0      15 Mar 2025 07:19    TPro  7.5  /  Alb  4.0  /  TBili  0.4  /  DBili  x   /  AST  22  /  ALT  18  /  AlkPhos  91  03-14

## 2025-03-15 NOTE — PROGRESS NOTE ADULT - SUBJECTIVE AND OBJECTIVE BOX
[facility] Division of Hospital Medicine  Amando Malhotra MD  Available on Teams [place]    Patient is a 75y old  Female who presents with a chief complaint of Urinary tract infection     (15 Mar 2025 12:10)      SUBJECTIVE / OVERNIGHT EVENTS:  Patient seen and evaluated at bedside, denies any acute overnight events. Does note feeling a bit lightheaded w/HA today, but denies any CP, abd pain, N/V/D. States she has been experiencing problems with short-term memory for which she is seeing a neurologist outpatient.     ADDITIONAL REVIEW OF SYSTEMS: negative    MEDICATIONS  (STANDING):  apixaban 5 milliGRAM(s) Oral every 12 hours  buPROPion XL (24-Hour) . 300 milliGRAM(s) Oral daily  cefTRIAXone   IVPB 1000 milliGRAM(s) IV Intermittent every 24 hours  diltiazem    milliGRAM(s) Oral daily  diltiazem Injectable 2.5 milliGRAM(s) IV Push once  influenza  Vaccine (HIGH DOSE) 0.5 milliLiter(s) IntraMuscular once  losartan 100 milliGRAM(s) Oral daily  pantoprazole    Tablet 40 milliGRAM(s) Oral before breakfast  rosuvastatin 5 milliGRAM(s) Oral at bedtime    MEDICATIONS  (PRN):  acetaminophen     Tablet .. 650 milliGRAM(s) Oral every 6 hours PRN Temp greater or equal to 38C (100.4F), Mild Pain (1 - 3)  ondansetron Injectable 4 milliGRAM(s) IV Push every 8 hours PRN Nausea and/or Vomiting      CAPILLARY BLOOD GLUCOSE        I&O's Summary    15 Mar 2025 07:01  -  15 Mar 2025 15:12  --------------------------------------------------------  IN: 600 mL / OUT: 0 mL / NET: 600 mL        PHYSICAL EXAM:  Vital Signs Last 24 Hrs  T(C): 36.6 (15 Mar 2025 12:56), Max: 36.8 (14 Mar 2025 20:15)  T(F): 97.9 (15 Mar 2025 12:56), Max: 98.2 (14 Mar 2025 20:15)  HR: 130 (15 Mar 2025 14:17) (61 - 133)  BP: 130/78 (15 Mar 2025 12:56) (130/78 - 178/71)  BP(mean): --  RR: 18 (15 Mar 2025 12:56) (15 - 18)  SpO2: 95% (15 Mar 2025 12:56) (94% - 98%)    Parameters below as of 15 Mar 2025 12:56  Patient On (Oxygen Delivery Method): room air      CONSTITUTIONAL: Well-groomed, in no apparent distress  EYES: No conjunctival or scleral injection, non-icteric  ENMT: No external nasal lesions; oral mucosa with moist membranes  RESPIRATORY: Breathing comfortably; lungs CTA without wheeze/rhonchi/rales  CARDIOVASCULAR: +S1S2, irregular, tachycardic, no M/G/R; pedal pulses full and symmetric; trace lower extremity edema  GASTROINTESTINAL: No palpable masses or tenderness, +BS throughout, no rebound/guarding  MUSCULOSKELETAL: no digital clubbing or cyanosis  SKIN: No rashes or ulcers noted  NEUROLOGIC: CN II-XII intact; sensation intact in LEs b/l to light touch  PSYCHIATRIC: A+O x 3; mood and affect appropriate    LABS:                        11.7   9.62  )-----------( 280      ( 15 Mar 2025 07:11 )             35.7     03-15    137  |  101  |  11  ----------------------------<  91  3.8   |  21[L]  |  0.80    Ca    9.0      15 Mar 2025 07:19    TPro  7.5  /  Alb  4.0  /  TBili  0.4  /  DBili  x   /  AST  22  /  ALT  18  /  AlkPhos  91  03-14    PT/INR - ( 14 Mar 2025 12:47 )   PT: 16.8 sec;   INR: 1.48 ratio         PTT - ( 14 Mar 2025 12:47 )  PTT:38.6 sec      Urinalysis Basic - ( 15 Mar 2025 07:19 )    Color: x / Appearance: x / SG: x / pH: x  Gluc: 91 mg/dL / Ketone: x  / Bili: x / Urobili: x   Blood: x / Protein: x / Nitrite: x   Leuk Esterase: x / RBC: x / WBC x   Sq Epi: x / Non Sq Epi: x / Bacteria: x          RADIOLOGY & ADDITIONAL TESTS:  Results Reviewed:   Imaging Personally Reviewed:  Electrocardiogram Personally Reviewed:    COORDINATION OF CARE:  Care Discussed with Consultants/Other Providers [Y/N]:  Prior or Outpatient Records Reviewed [Y/N]:

## 2025-03-15 NOTE — PROVIDER CONTACT NOTE (OTHER) - ACTION/TREATMENT ORDERED:
Provider notified. No new interventions at this time.
Provider notified. No new interventions at this time.

## 2025-03-15 NOTE — DIETITIAN INITIAL EVALUATION ADULT - ORAL INTAKE PTA/DIET HISTORY
Pt reports having a decreased appetite and PO intake x few days PTA; reports consuming 1-2 meals/day at baseline. Follows regular diet. Pt denies any known food allergies or intolerances. Pt taking Multivitamin, vitamin C at home. Pt reports she has dentures, but denies any difficulty chewing/swallowing at this time.

## 2025-03-15 NOTE — DIETITIAN INITIAL EVALUATION ADULT - PROBLEM SELECTOR PLAN 3
Currently rate controlled  - Continue Eliquis 5mg BID  - Continue diltiazem 180mg qd  - EKG showing NSR, HR 67

## 2025-03-15 NOTE — DIETITIAN INITIAL EVALUATION ADULT - NSFNSGIIOFT_GEN_A_CORE
Pt denies any current N/V/D/C. Per pt, last BM x yesterday. No current bowel regimen in place; pt ordered for zofran.

## 2025-03-15 NOTE — CONSULT NOTE ADULT - SUBJECTIVE AND OBJECTIVE BOX
Neurology - Consult Note    -  Spectra: [phone] ([facility]), [phone] ([facility])  -    HPI: Patient EMRE KIM is a 75y (1950) wo/man with a PMHx significant for a fib on eliquis s/p ablation 3/2022, HTN, multiple prior strokes without residual deficits, HARIS with CPAP, right knee replacement, chiari malformation type 1, b/l cataracts presenting to the ED for intermittent word finding difficulty along with remembering the past frequently and sometimes hallucinating about the past. Patient aware of what is happening. Has also not been eating as she usually does and feeling generally weak. Denies focal numbness/weakness, vision changes. Has felt positional dizziness. Seen by neurology before for dizziness, found to have peripheral vertigo likely BPPV. Per , patient does not go to ENT and [facility] rehab.     Follows with neurologist in [place] and was told she has early signs of dementia. Has not followed up in over a year and does not recall name.     In ED, has UTI and leukocytosis to 10.72. Neuroimaging negative for acute pathology.       Review of Systems:    All other review of systems is negative unless indicated above.    Allergies:  adhesives (Rash)  latex (Rash)  atorvastatin (Muscle Pain)  nickel allergy- rash (Rash)  metoprolol (Short breath)      PMHx/PSHx/Family Hx: As above, otherwise see below   HTN (Hypertension)    Pericarditis    Muscle Cramps at Night    HARIS (obstructive sleep apnea)    Patient is Cheondoism    Primary osteoarthritis of right knee    Morbid obesity due to excess calories    No blood products    HARIS on CPAP    Overactive bladder    H/O constipation    GERD (gastroesophageal reflux disease)    Atrial fibrillation    H/O fibromyalgia    Anxiety and depression    Latex allergy    Migraines        Social Hx:  No current use of tobacco, alcohol, or illicit drugs  Lives with      Medications:  MEDICATIONS  (STANDING):    MEDICATIONS  (PRN):      Vitals:  T(C): 36.6 (03-14-25 @ 15:55), Max: 36.6 (03-14-25 @ 11:57)  HR: 66 (03-14-25 @ 15:55) (66 - 68)  BP: 174/76 (03-14-25 @ 15:55) (152/63 - 185/73)  RR: 16 (03-14-25 @ 15:55) (15 - 18)  SpO2: 97% (03-14-25 @ 15:55) (94% - 97%)    Physical Examination:   General - NAD  Cardiovascular - Peripheral pulses palpable  Eyes - Fundoscopy not performed due to safety precautions in the setting of the COVID-19 pandemic    Neurologic Exam:  Mental status - Awake, Alert, Oriented to person, place, and time. Speech fluent, repetition and naming intact. Follows simple and complex commands. Attention/concentration and fund of knowledge intact. Recent and remote memory not fully intact.     Cranial nerves - PERRLA, VFF, EOMI, face sensation (V1-V3) intact b/l, facial strength intact without asymmetry b/l, hearing intact b/l, palate with symmetric elevation, trapezius 5/5 strength b/l, tongue midline on protrusion with full lateral movement    Motor - Normal bulk and tone throughout. No pronator drift.  Strength testing            Deltoid      Biceps      Triceps     Wrist Extension    Wrist Flexion     Interossei         R            5                 5               5                     5                              5                        5                 5  L             5                 5               5                     5                              5                        5                 5              Hip Flexion    Hip Extension    Knee Flexion    Knee Extension    Dorsiflexion    Plantar Flexion  R              5                           5                       5                           5                            5                          5  L              5                           5                        5                           5                            5                          5    Sensation - Light touch intact throughout    DTR's -             Biceps      Triceps     Brachioradialis      Patellar    Ankle    Toes/plantar response  R             2+             2+                  2+               2+            1+                 mute  L              2+             2+                 2+                2+           1+                 mute     Coordination - Finger to Nose intact b/l. B/l essential tremors appreciated    Gait and station - HERIBERTO given c/f safety     Labs:                        12.5   10.72 )-----------( 299      ( 14 Mar 2025 12:47 )             38.7     03-14    133[L]  |  97  |  16  ----------------------------<  124[H]  4.4   |  21[L]  |  0.83    Ca    9.4      14 Mar 2025 12:47    TPro  7.5  /  Alb  4.0  /  TBili  0.4  /  DBili  x   /  AST  22  /  ALT  18  /  AlkPhos  91  03-14    CAPILLARY BLOOD GLUCOSE        LIVER FUNCTIONS - ( 14 Mar 2025 12:47 )  Alb: 4.0 g/dL / Pro: 7.5 g/dL / ALK PHOS: 91 U/L / ALT: 18 U/L / AST: 22 U/L / GGT: x             PT/INR - ( 14 Mar 2025 12:47 )   PT: 16.8 sec;   INR: 1.48 ratio         PTT - ( 14 Mar 2025 12:47 )  PTT:38.6 sec  CSF:                  Radiology:  < from: CT Angio Neck w/ IV Cont (03.14.25 @ 15:11) >  CT HEAD:  No acute intracranial hemorrhage, mass effect, or midline shift. Moderate   chronic microvascular ischemic changes.    CTA NECK:  Calcified atherosclerotic plaque at the carotid bifurcations bilaterally.   There is associated moderate stenosis of the proximal right internal   carotid artery and mild to moderate stenosis of the proximal left   internal carotid artery.    CTA HEAD:  No large vesselocclusion, significant stenosis or vascular abnormality   identified.    < end of copied text >      
[facility] Attending Consultation Note     Patient seen and evaluated at bedside    Chief Complaint:    HPI:  75 year old female, Roman Catholic, with hx of Atrial fibrillation, on eliquis s/p ablation 3/2022, HTN, multiple prior strokes without residual deficits, HARIS with CPAP, right knee replacement, chiari malformation type 1, b/l cataracts presenting with multiple complaints, including headache, confusion, generalized weakness, high blood pressure. Pt accompanied by  who provided history. For the last few days, patient has been feeling unwell; she felt tired and lethargic. She followed up with her cardiologist and was noted to be hypertensive. Pt also was feeling lightheaded and nausea at that time. Pt was urged to come to hospital for further evaluation. Pt denied any chest pain, SOB, fevers, chills,  endorsed intermittent cough, primarily at night. Also notes that she was diagnosed with early dementia by her neurologist several months ago. She has trouble with her short term memory but will sometimes bring up events from the distant past. She denies any urinary symptoms. No abd pain, constipation, or diarrhea. Pt currently AAOx3 and able to answer questions appropriately.     In the ED, CT imaging of head without acute findings. Pt evaluated by neurology who felt symptoms were metabolic/ due to UTI. Pt given a dose of ceftriaxone in ED. Admitted for further management.  (14 Mar 2025 20:46)      PMHx:   HTN (Hypertension)    Pericarditis    Muscle Cramps at Night    HARIS (obstructive sleep apnea)    Patient is Protestant    Primary osteoarthritis of right knee    Morbid obesity due to excess calories    No blood products    HARIS on CPAP    Overactive bladder    H/O constipation    GERD (gastroesophageal reflux disease)    Atrial fibrillation    H/O fibromyalgia    Anxiety and depression    Latex allergy    Migraines        PSHx:   Status Post Breast Lumpectomy    Bladder Prolapse, Congenital    Bladder Prolapse, Acquired    Tubal Ligation    Status post total right knee replacement    S/P AV melchor ablation        Allergies:  adhesives (Rash)  latex (Rash)  atorvastatin (Muscle Pain)  nickel allergy- rash (Rash)  metoprolol (Short breath)      Home Meds:    Current Medications:   acetaminophen     Tablet .. 650 milliGRAM(s) Oral every 6 hours PRN  apixaban 5 milliGRAM(s) Oral every 12 hours  buPROPion XL (24-Hour) . 300 milliGRAM(s) Oral daily  cefTRIAXone   IVPB 1000 milliGRAM(s) IV Intermittent every 24 hours  diltiazem    milliGRAM(s) Oral daily  influenza  Vaccine (HIGH DOSE) 0.5 milliLiter(s) IntraMuscular once  losartan 100 milliGRAM(s) Oral daily  ondansetron Injectable 4 milliGRAM(s) IV Push every 8 hours PRN  pantoprazole    Tablet 40 milliGRAM(s) Oral before breakfast  rosuvastatin 5 milliGRAM(s) Oral at bedtime      FAMILY HISTORY:  Family history of congenital heart disease (Sibling)    Family hx of lung cancer (Father)    FH: breast cancer (Sibling)        Social History: Personally reviewed   No tobacco, EtOH or IVDU     REVIEW OF SYSTEMS:  Constitutional:     [x ] negative [ ] fevers [ ] chills [ ] weight loss [ ] weight gain  HEENT:                  [x ] negative [ ] dry eyes [ ] eye irritation [ ] postnasal drip [ ] nasal congestion  CV:                         [ x] negative  [ ] chest pain [ ] orthopnea [ ] palpitations [ ] murmur  Resp:                     [x ] negative [ ] cough [ ] shortness of breath [ ] dyspnea [ ] wheezing [ ] sputum [ ]hemoptysis  GI:                          [ x] negative [ ] nausea [ ] vomiting [ ] diarrhea [ ] constipation [ ] abd pain [ ] dysphagia   :                        [ x] negative [ ] dysuria [ ] nocturia [ ] hematuria [ ] increased urinary frequency  Musculoskeletal: [x ] negative [ ] back pain [ ] myalgias [ ] arthralgias [ ] fracture  Skin:                       [ x] negative [ ] rash [ ] itch  Neurological:        [ x] negative [ ] headache [ ] dizziness [ ] syncope [ ] weakness [ ] numbness  Psychiatric:           [ x] negative [ ] anxiety [ ] depression  Endocrine:            [ x] negative [ ] diabetes [ ] thyroid problem  Heme/Lymph:      [ x] negative [ ] anemia [ ] bleeding problem  Allergic/Immune: [ x] negative [ ] itchy eyes [ ] nasal discharge [ ] hives [ ] angioedema    [ x] All other systems negative  [ ] Unable to assess ROS due to      Physical Exam:  T(F): 98.4 (03-15), Max: 98.4 (03-15)  HR: 132 (03-15) (62 - 133)  BP: 115/72 (03-15) (111/75 - 177/71)  RR: 18 (03-15)  SpO2: 94% (03-15)    Gen: Well appearing   HENNT: No JVP   CV: tachyardia rate, irregular rhtyhm, no murmur   Pulm: clear to auscultation bilaterally   Abdomen: Non-tender, non-distended   Ext: no edema b/l   Neuro: grossly non-focal     Cardiovascular Diagnostic Testing:    CONCLUSIONS:  1. Mitral annular calcification, otherwise normal mitral  valve. Minimal mitral regurgitation.  2. Aortic valve leaflet morphology not well visualized.  The valve is calcified. Peak transaortic valve gradient  equals 26 mm Hg, mean transaortic valve gradient equals 12  mm Hg, estimated aortic valve area equals 1.4 sqcm (by  continuity equation), consistent with moderate aortic  stenosis. Minimal aortic regurgitation.  3. Mildly dilated left atrium.  LA volume index = 37 cc/m2.  4. Normal left ventricular internal dimensions and wall  thicknesses.  5. Endocardium not well visualized; grossly normal left  ventricular systolic function.  6. Mild diastolic dysfunction (Stage I).  7. The right ventricle is not well visualized; grossly  normal right ventricular systolic function.  8. A bubble study was performed with the intravenous  injection of agitated saline.  Following contrast  injection, no obvious bubbles were seen in the left heart.  No obvious cardiac source of embolus was identified on this  transthoracic study.  If clinical suspicion is high,  consider BESSIE for further evaluation.      Labs: Personally reviewed                        11.7   9.62  )-----------( 280      ( 15 Mar 2025 07:11 )             35.7     03-15    137  |  101  |  11  ----------------------------<  91  3.8   |  21[L]  |  0.80    Ca    9.0      15 Mar 2025 07:19    TPro  7.5  /  Alb  4.0  /  TBili  0.4  /  DBili  x   /  AST  22  /  ALT  18  /  AlkPhos  91  03-14    PT/INR - ( 14 Mar 2025 12:47 )   PT: 16.8 sec;   INR: 1.48 ratio         PTT - ( 14 Mar 2025 12:47 )  PTT:38.6 sec        Thyroid Stimulating Hormone, Serum: 1.68 uIU/mL (03-15 @ 07:11)

## 2025-03-15 NOTE — DIETITIAN INITIAL EVALUATION ADULT - ENERGY INTAKE
In house, pt reports consuming <50% of breakfast this AM. No PO intake information available per flowsheets at this time.  Poor (<50%)

## 2025-03-16 PROCEDURE — 99232 SBSQ HOSP IP/OBS MODERATE 35: CPT

## 2025-03-16 RX ORDER — CEFTRIAXONE 500 MG/1
1000 INJECTION, POWDER, FOR SOLUTION INTRAMUSCULAR; INTRAVENOUS EVERY 24 HOURS
Refills: 0 | Status: DISCONTINUED | OUTPATIENT
Start: 2025-03-17 | End: 2025-03-17

## 2025-03-16 RX ADMIN — BUPROPION HYDROBROMIDE 300 MILLIGRAM(S): 522 TABLET, EXTENDED RELEASE ORAL at 11:17

## 2025-03-16 RX ADMIN — APIXABAN 5 MILLIGRAM(S): 2.5 TABLET, FILM COATED ORAL at 16:53

## 2025-03-16 RX ADMIN — Medication 40 MILLIGRAM(S): at 05:11

## 2025-03-16 RX ADMIN — DILTIAZEM HYDROCHLORIDE 240 MILLIGRAM(S): 240 TABLET, EXTENDED RELEASE ORAL at 05:11

## 2025-03-16 RX ADMIN — CEFTRIAXONE 100 MILLIGRAM(S): 500 INJECTION, POWDER, FOR SOLUTION INTRAMUSCULAR; INTRAVENOUS at 16:53

## 2025-03-16 RX ADMIN — APIXABAN 5 MILLIGRAM(S): 2.5 TABLET, FILM COATED ORAL at 05:11

## 2025-03-16 RX ADMIN — ROSUVASTATIN CALCIUM 5 MILLIGRAM(S): 20 TABLET, FILM COATED ORAL at 21:12

## 2025-03-16 RX ADMIN — Medication 500 MILLILITER(S): at 15:45

## 2025-03-16 NOTE — DISCHARGE NOTE PROVIDER - CARE PROVIDERS DIRECT ADDRESSES
,[email or web address]

## 2025-03-16 NOTE — PROGRESS NOTE ADULT - SUBJECTIVE AND OBJECTIVE BOX
[facility] Attending Follow-up Note     Patient seen and examined at bedside. 3/16/2025     Overnight Events:     events noted     REVIEW OF SYSTEMS:  Constitutional:     [x ] negative [ ] fevers [ ] chills [ ] weight loss [ ] weight gain  HEENT:                  [x ] negative [ ] dry eyes [ ] eye irritation [ ] postnasal drip [ ] nasal congestion  CV:                         [ x] negative  [ ] chest pain [ ] orthopnea [ ] palpitations [ ] murmur  Resp:                     [ x] negative [ ] cough [ ] shortness of breath [ ] dyspnea [ ] wheezing [ ] sputum [ ]hemoptysis  GI:                          [ x] negative [ ] nausea [ ] vomiting [ ] diarrhea [ ] constipation [ ] abd pain [ ] dysphagia   :                        [ x] negative [ ] dysuria [ ] nocturia [ ] hematuria [ ] increased urinary frequency  Musculoskeletal: [ x] negative [ ] back pain [ ] myalgias [ ] arthralgias [ ] fracture  Skin:                       [ x] negative [ ] rash [ ] itch  Neurological:        [x ] negative [ ] headache [ ] dizziness [ ] syncope [ ] weakness [ ] numbness  Psychiatric:           [ x] negative [ ] anxiety [ ] depression  Endocrine:            [ x] negative [ ] diabetes [ ] thyroid problem  Heme/Lymph:      [ x] negative [ ] anemia [ ] bleeding problem  Allergic/Immune: [ x] negative [ ] itchy eyes [ ] nasal discharge [ ] hives [ ] angioedema    [ x] All other systems negative  [ ] Unable to assess ROS due to    Current Meds:  acetaminophen     Tablet .. 650 milliGRAM(s) Oral every 6 hours PRN  apixaban 5 milliGRAM(s) Oral every 12 hours  buPROPion XL (24-Hour) . 300 milliGRAM(s) Oral daily  cefTRIAXone   IVPB 1000 milliGRAM(s) IV Intermittent every 24 hours  diltiazem    milliGRAM(s) Oral daily  influenza  Vaccine (HIGH DOSE) 0.5 milliLiter(s) IntraMuscular once  ondansetron Injectable 4 milliGRAM(s) IV Push every 8 hours PRN  pantoprazole    Tablet 40 milliGRAM(s) Oral before breakfast  rosuvastatin 5 milliGRAM(s) Oral at bedtime      PAST MEDICAL & SURGICAL HISTORY:  HTN (Hypertension)      Pericarditis  around age 30's      Muscle Cramps at Night      HARIS (obstructive sleep apnea)  noncompliant with CPAP      Patient is [place]  refuse blood products      Primary osteoarthritis of right knee      Morbid obesity due to excess calories      Overactive bladder      H/O constipation      GERD (gastroesophageal reflux disease)      Atrial fibrillation  persistent in 2022      H/O fibromyalgia      Anxiety and depression      Latex allergy      Migraines      Status Post Breast Lumpectomy  right benign      Bladder Prolapse, Acquired  s/p repair      Tubal Ligation      Status post total right knee replacement  10/25/17      S/P AV melchor ablation  3/10/22          Vitals:  T(F): 97.6 (03-17), Max: 97.6 (03-16)  HR: 59 (03-17) (54 - 62)  BP: 165/77 (03-17) (134/73 - 165/77)  RR: 18 (03-17)  SpO2: 95% (03-17)  I&O's Summary    16 Mar 2025 07:01  -  17 Mar 2025 07:00  --------------------------------------------------------  IN: 740 mL / OUT: 0 mL / NET: 740 mL        Physical Exam:  Appearance: No acute distress  HENT: No JVD   Cardiovascular: RRR, S1/S2, no murmurs  Respiratory: CTABL  Gastrointestinal: soft, NT ND, +BS  Musculoskeletal: No clubbing, no edema   Neurologic: Non-focal  Skin: No rashes, ecchymoses, or cyanosis                          Cardiovascular Testings:      normal S1, S2 heard

## 2025-03-16 NOTE — DISCHARGE NOTE PROVIDER - HOSPITAL COURSE
HPI:  75 year old female, Oriental orthodox, with hx of Atrial fibrillation, on eliquis s/p ablation 3/2022, HTN, multiple prior strokes without residual deficits, HARIS with CPAP, right knee replacement, chiari malformation type 1, b/l cataracts presenting with multiple complaints, including headache, confusion, generalized weakness, high blood pressure. Pt accompanied by  who provided history. For the last few days, patient has been feeling unwell; she felt tired and lethargic. She followed up with her cardiologist and was noted to be hypertensive. Pt also was feeling lightheaded and nausea at that time. Pt was urged to come to hospital for further evaluation. Pt denied any chest pain, SOB, fevers, chills,  endorsed intermittent cough, primarily at night. Also notes that she was diagnosed with early dementia by her neurologist several months ago. She has trouble with her short term memory but will sometimes bring up events from the distant past. She denies any urinary symptoms. No abd pain, constipation, or diarrhea. Pt currently AAOx3 and able to answer questions appropriately.     In the ED, CT imaging of head without acute findings. Pt evaluated by neurology who felt symptoms were metabolic/ due to UTI. Pt given a dose of ceftriaxone in ED. Admitted for further management.  (14 Mar 2025 20:46)    Hospital Course:  Patient was admitted with multiple complaints and found to be hypertensive, urinalysis positive with UTI. Was started on Ceftriaxone and will transition to PO _____ to complete five days antibiotics. Held oxybutynin for uti and confusion/headache and drowsiness, CTH was performed negative for acute findings. Was found to be in afib with RVR, diltiazem increased and losartan discontinued as BP decreased. Can follow up outpatient with Dr. Corley. Patient was also found to be orthostatic positive, received IV fluids and resulted in ________.    Patient is medically cleared for discharge. Medication reconciliation reviewed, revised, and resolved with Dr. Phelan who had medically cleared patient for discharge with follow-up as advised. Patient is currently stable for discharge to home at this time.    Important Medication Changes and Reason:  Please see medication reconciliation for any medication changes    Active or Pending Issues Requiring Follow-up:  - PCP  - Cardiology  - Neurology    Advanced Directives:   [x] Full code  [ ] DNR  [ ] Hospice    Discharge Diagnoses:  Acute UTI  HTN  Chronic Afib  HLD     HPI:  75 year old female, Restorationist, with hx of Atrial fibrillation, on eliquis s/p ablation 3/2022, HTN, multiple prior strokes without residual deficits, HARIS with CPAP, right knee replacement, chiari malformation type 1, b/l cataracts presenting with multiple complaints, including headache, confusion, generalized weakness, high blood pressure. Pt accompanied by  who provided history. For the last few days, patient has been feeling unwell; she felt tired and lethargic. She followed up with her cardiologist and was noted to be hypertensive. Pt also was feeling lightheaded and nausea at that time. Pt was urged to come to hospital for further evaluation. Pt denied any chest pain, SOB, fevers, chills,  endorsed intermittent cough, primarily at night. Also notes that she was diagnosed with early dementia by her neurologist several months ago. She has trouble with her short term memory but will sometimes bring up events from the distant past. She denies any urinary symptoms. No abd pain, constipation, or diarrhea. Pt currently AAOx3 and able to answer questions appropriately.   In the ED, CT imaging of head without acute findings. Pt evaluated by neurology who felt symptoms were metabolic/ due to UTI. Pt given a dose of ceftriaxone in ED. Admitted for further management.     Hospital Course:  Patient was admitted with multiple complaints and found to be hypertensive, urinalysis positive with UTI. Was started on Ceftriaxone and transitioned to PO bactrim to complete course of antibiotics. Held oxybutynin for uti and confusion/headache and drowsiness, CTH was performed negative for acute findings. Evaled per neurology. Was found to be in afib with RVR, diltiazem increased and losartan discontinued as BP decreased per cardiology. Planned for TTE as outpt. Can follow up outpatient with Dr. Corley. Patient was also found to be orthostatic positive, received IV fluids. Outpt f/u.     Important Medication Changes and Reason:  Please see medication reconciliation for any medication changes    Active or Pending Issues Requiring Follow-up:  - PCP  - Cardiology  - Neurology    Advanced Directives:   [x] Full code  [ ] DNR  [ ] Hospice    Discharge Diagnoses:  Acute UTI  HTN  Chronic Afib  HLD  orthostatic hypotension        Discharge/Dispo/Med rec discussed with attending Dr. Phelan. Patient medically cleared for discharge Home with outpatient follow up with PCP, cardiology and neurology

## 2025-03-16 NOTE — DISCHARGE NOTE PROVIDER - PROVIDER TOKENS
PROVIDER:[TOKEN:[[id number]],FOLLOWUP:[1 week]],PROVIDER:[TOKEN:[[id number]],FOLLOWUP:[2 weeks]] PROVIDER:[TOKEN:[[id number]],FOLLOWUP:[1 week]],PROVIDER:[TOKEN:[[id number]],FOLLOWUP:[2 weeks]],PROVIDER:[TOKEN:[[id number]]]

## 2025-03-16 NOTE — DISCHARGE NOTE PROVIDER - NSDCCPCAREPLAN_GEN_ALL_CORE_FT
PRINCIPAL DISCHARGE DIAGNOSIS  Diagnosis: Acute UTI  Assessment and Plan of Treatment: You had a bladder and/or kidney infection. Please continue to take your antibiotics as prescribed. Avoid medications that will cause urinary retention such as benadryl whenever possible.  Drink adequate fluids - there is no harm in drinking cranberry juice.  Females should urinate right after sex.  Contact your doctor if you experience new symptoms or continued symptoms after treatment, such as pain or burning with urination, frequent urination, urinary urgency, blood in the urine, fever, back pain, and/or nausea vomiting.        SECONDARY DISCHARGE DIAGNOSES  Diagnosis: Chronic atrial fibrillation  Assessment and Plan of Treatment: Atrial fibrillation is a common heart rhythm problem which increases the risk of stroke and heat attack.  It helps if you control your blood pressure, avoid alcohol, cut down on caffeine, get treatment for your thyroid if it is overactive, and perform moderate exercise in consultation with your Primary Care Provider.  Call your doctor if you experience chest tightness/pain, lightheadedness, loss of consciousness, shortness of breath (especially with exercise), feel your heart racing or beating unusually, frequent or abnormal bleeding.  It is important to take all your heart medications as prescribed.  Please note we have made changes to your medication to control your heart rate. Let your cardiologist know and schedule regular follow up appointments.    Diagnosis: Hypertension  Assessment and Plan of Treatment: Continue to follow a low salt/sodium diet.  Perform physical activities as tolerated in consultation with your Primary Care Provider and physical therapist.  Take all medications as prescribed.  Follow up with your medical doctor for routine blood pressure monitoring at your next visit.  Notify your doctor if you have any of the following symptoms:  Dizziness, lightheadedness, blurry vision, headache, chest pain, or shortness of breath.  We have made changes to your medication. Please monitor your blood pressure regularly and let your doctor know if there are any major changes.     PRINCIPAL DISCHARGE DIAGNOSIS  Diagnosis: Acute UTI  Assessment and Plan of Treatment: You had a bladder and/or kidney infection. Please continue to take your antibiotics as prescribed. Avoid medications that will cause urinary retention such as benadryl whenever possible.  Drink adequate fluids - there is no harm in drinking cranberry juice.  Females should urinate right after sex.  Contact your doctor if you experience new symptoms or continued symptoms after treatment, such as pain or burning with urination, frequent urination, urinary urgency, blood in the urine, fever, back pain, and/or nausea vomiting.        SECONDARY DISCHARGE DIAGNOSES  Diagnosis: Chronic atrial fibrillation  Assessment and Plan of Treatment: Plan for outpatient echocardiogram (scheduled 4/17/25) and followup with cardiology outpatient   Atrial fibrillation is a common heart rhythm problem which increases the risk of stroke and heat attack.  It helps if you control your blood pressure, avoid alcohol, cut down on caffeine, get treatment for your thyroid if it is overactive, and perform moderate exercise in consultation with your Primary Care Provider.  Call your doctor if you experience chest tightness/pain, lightheadedness, loss of consciousness, shortness of breath (especially with exercise), feel your heart racing or beating unusually, frequent or abnormal bleeding.  It is important to take all your heart medications as prescribed.  Please note we have made changes to your medication to control your heart rate. Let your cardiologist know and schedule regular follow up appointments.    Diagnosis: Hypertension  Assessment and Plan of Treatment: Continue to follow a low salt/sodium diet.  Perform physical activities as tolerated in consultation with your Primary Care Provider and physical therapist.  Take all medications as prescribed.  Follow up with your medical doctor for routine blood pressure monitoring at your next visit.  Notify your doctor if you have any of the following symptoms:  Dizziness, lightheadedness, blurry vision, headache, chest pain, or shortness of breath.  We have made changes to your medication. Please monitor your blood pressure regularly and let your doctor know if there are any major changes.

## 2025-03-16 NOTE — DISCHARGE NOTE PROVIDER - ATTENDING DISCHARGE PHYSICAL EXAMINATION:
CONSTITUTIONAL: Well-groomed, in no apparent distress  EYES: No conjunctival or scleral injection, non-icteric  ENMT: No external nasal lesions; oral mucosa with moist membranes  RESPIRATORY: Breathing comfortably; lungs CTA without wheeze/rhonchi/rales  CARDIOVASCULAR: +S1S2, regular, no M/G/R; pedal pulses full and symmetric; no lower extremity edema  GASTROINTESTINAL: No palpable masses or tenderness, +BS throughout, no rebound/guarding  MUSCULOSKELETAL: no digital clubbing or cyanosis  SKIN: No rashes or ulcers noted  NEUROLOGIC: No new FND  PSYCHIATRIC: A+O x 3; mood and affect appropriate

## 2025-03-16 NOTE — PROGRESS NOTE ADULT - ASSESSMENT
75F w/ PMH of AF s/p ablation, on Eliquis, HTN, multiple CVAs, here for UTI and cardiology consulted for HTN and AF management     -AF w/ RVR triggered by underlying UTI   -cont Cardizem CD to 240 mg QD   -cont Metoprolol 25 mg TID with hold parameters (hold if SBP<90 or HR<60)   -IV metoprolol 5 mg PRN for HR>130 persistently   -if further rate control needed, consider IV digoxin load   -cont losartan 100 mg qd  -cont eliquis for AC   -check TTE ECHO   -monitor on tele   -monitor maude Wood MD [facility]  Attending Interventional Cardiologist, Nora-NS/ROMAN.   Avaliable on Whi Team  
75 year old female, Hindu, with hx of Atrial fibrillation, on eliquis s/p ablation 3/2022, HTN, multiple prior strokes without residual deficits, HARIS with CPAP, right knee replacement, chiari malformation type 1, b/l cataracts presenting with multiple complaints, found to be hypertensive and UA positive for UTI. Pt admitted for further management. 
75 year old female, Rastafari, with hx of Atrial fibrillation, on eliquis s/p ablation 3/2022, HTN, multiple prior strokes without residual deficits, HARIS with CPAP, right knee replacement, chiari malformation type 1, b/l cataracts presenting with multiple complaints, found to be hypertensive and UA positive for UTI. Pt admitted for further management.

## 2025-03-16 NOTE — PROGRESS NOTE ADULT - PROBLEM SELECTOR PLAN 1
UA positive for UTI. However patient not currently endorsing urinary symptoms, though she does take oxybutynin.   - Will treat with course of ceftriaxone 1g q24h for 5 days  - Hold oxybutynin for now given UTI and possible anticholinergic effects may explain some of patient's symptoms (confusion, headache, drowsiness)  - Urine cx with >100 col E coli, given dx of dementia, mentation changes will tx for total of 5d, likely transition to PO in AM pending sensitivities  - CTH without acute findings, if mental status worsens, consider MRI
UA positive for UTI. However patient not currently endorsing urinary symptoms, though she does take oxybutynin.   - Will treat with course of ceftriaxone 1g q24h for 3 days  - Hold oxybutynin for now given UTI and possible anticholinergic effects may explain some of patient's symptoms (confusion, headache, drowsiness)  - F/u urine culture  - CTH without acute findings, if mental status worsens, consider MRI

## 2025-03-16 NOTE — DISCHARGE NOTE PROVIDER - NSDCFUADDAPPT_GEN_ALL_CORE_FT
APPTS ARE READY TO BE MADE: [X] YES    Best Family or Patient Contact (if needed):    Additional Information about above appointments (if needed):    1: PCP  2: Cardiology  3: Neurology     Other comments or requests:    APPTS ARE READY TO BE MADE: [X] YES    Best Family or Patient Contact (if needed):    Additional Information about above appointments (if needed):    1: PCP  2: Cardiology  3: Neurology     Other comments or requests:   Patient was outreached but did not answer. A voicemail was left for the patient to return our call. Also left a message for patient's emergency contact on file.

## 2025-03-16 NOTE — DISCHARGE NOTE PROVIDER - CARE PROVIDER_API CALL
Amberly Johnson  Internal Medicine  [address] [place] [address]  Phone: [phone]  Fax: [phone]  Follow Up Time: 1 week    Earnest Corley  Cardiac Electrophysiology  [address] [place] [address], [address]  [place] [address]  Phone: [phone]  Fax: [phone]  Follow Up Time: 2 weeks   Amberly Johnson  Internal Medicine  [address] [place] [address]  Phone: [phone]  Fax: [phone]  Follow Up Time: 1 week    Earnest Corley  Cardiac Electrophysiology  [address] [address] [place] [address], [address]  [place] [address]  Phone: [phone]  Fax: [phone]  Follow Up Time: 2 weeks    Odell Cadet  Cardiovascular Disease  [address] [place] [address], [address]  [place] [address]  Phone: [phone]  Fax: [phone]  Follow Up Time:

## 2025-03-16 NOTE — DISCHARGE NOTE PROVIDER - NSDCMRMEDTOKEN_GEN_ALL_CORE_FT
apixaban 5 mg oral tablet: 1 tab(s) orally every 12 hours  buPROPion 300 mg/24 hours (XL) oral tablet, extended release: 1 tab(s) orally once a day  DilTIAZem (Eqv-Cardizem CD) 180 mg/24 hours oral capsule, extended release: 1 cap(s) orally once a day  losartan 100 mg oral tablet: 1 tab(s) orally once a day  oxyBUTYnin 15 mg/24 hr oral tablet, extended release: 1 tab(s) orally once a day  pantoprazole 40 mg oral delayed release tablet: 1 tab(s) orally once a day  rosuvastatin 5 mg oral tablet: 1 tab(s) orally once a day   apixaban 5 mg oral tablet: 1 tab(s) orally every 12 hours  buPROPion 300 mg/24 hours (XL) oral tablet, extended release: 1 tab(s) orally once a day  losartan 100 mg oral tablet: 1 tab(s) orally once a day  oxyBUTYnin 15 mg/24 hr oral tablet, extended release: 1 tab(s) orally once a day  pantoprazole 40 mg oral delayed release tablet: 1 tab(s) orally once a day  rosuvastatin 5 mg oral tablet: 1 tab(s) orally once a day   apixaban 5 mg oral tablet: 1 tab(s) orally every 12 hours  Bactrim  mg-160 mg oral tablet: 1 tab(s) orally 2 times a day  buPROPion 300 mg/24 hours (XL) oral tablet, extended release: 1 tab(s) orally once a day  dilTIAZem 240 mg/24 hours oral capsule, extended release: 1 cap(s) orally once a day  pantoprazole 40 mg oral delayed release tablet: 1 tab(s) orally once a day  rosuvastatin 5 mg oral tablet: 1 tab(s) orally once a day

## 2025-03-16 NOTE — PROGRESS NOTE ADULT - PROBLEM SELECTOR PLAN 5
DVT ppx- on Eliquis  Diet - DASH  Dispo - anticipate d/c home 3/17 pending TTE and urine sensitivities

## 2025-03-16 NOTE — PROGRESS NOTE ADULT - SUBJECTIVE AND OBJECTIVE BOX
[facility] Division of Hospital Medicine  Amando Malhotra MD  Available on Teams Lazaro    Patient is a 75y old  Female who presents with a chief complaint of worsening memory/speech, UTI, hypertension (15 Mar 2025 15:11)      SUBJECTIVE / OVERNIGHT EVENTS:  Patient evaluated at bedside. Reports still slight lightheadedness, mostly with moving her head abruptly but significantly improved compared to presentation.  also confirms pt at baseline mentation. Pt denies CP, abd pain, notes still no BM but reports has not been eating much food since admission either (notes disinterest in dining choices), denies sensation of bloating/constipation.     ADDITIONAL REVIEW OF SYSTEMS: negative    MEDICATIONS  (STANDING):  apixaban 5 milliGRAM(s) Oral every 12 hours  buPROPion XL (24-Hour) . 300 milliGRAM(s) Oral daily  cefTRIAXone   IVPB 1000 milliGRAM(s) IV Intermittent every 24 hours  diltiazem    milliGRAM(s) Oral daily  influenza  Vaccine (HIGH DOSE) 0.5 milliLiter(s) IntraMuscular once  pantoprazole    Tablet 40 milliGRAM(s) Oral before breakfast  rosuvastatin 5 milliGRAM(s) Oral at bedtime    MEDICATIONS  (PRN):  acetaminophen     Tablet .. 650 milliGRAM(s) Oral every 6 hours PRN Temp greater or equal to 38C (100.4F), Mild Pain (1 - 3)  ondansetron Injectable 4 milliGRAM(s) IV Push every 8 hours PRN Nausea and/or Vomiting      CAPILLARY BLOOD GLUCOSE        I&O's Summary    15 Mar 2025 07:01  -  16 Mar 2025 07:00  --------------------------------------------------------  IN: 840 mL / OUT: 0 mL / NET: 840 mL    16 Mar 2025 07:01  -  16 Mar 2025 14:02  --------------------------------------------------------  IN: 480 mL / OUT: 0 mL / NET: 480 mL        PHYSICAL EXAM:  Vital Signs Last 24 Hrs  T(C): 36.6 (16 Mar 2025 11:16), Max: 36.9 (15 Mar 2025 20:45)  T(F): 97.9 (16 Mar 2025 11:16), Max: 98.4 (15 Mar 2025 20:45)  HR: 60 (16 Mar 2025 11:16) (60 - 132)  BP: 127/60 (16 Mar 2025 11:16) (111/75 - 127/60)  BP(mean): --  RR: 18 (16 Mar 2025 11:16) (18 - 18)  SpO2: 95% (16 Mar 2025 11:16) (94% - 98%)    Parameters below as of 16 Mar 2025 11:16  Patient On (Oxygen Delivery Method): room air    CONSTITUTIONAL: Well-groomed, in no apparent distress  EYES: No conjunctival or scleral injection, non-icteric  ENMT: No external nasal lesions; oral mucosa with moist membranes  RESPIRATORY: Breathing comfortably; lungs CTA without wheeze/rhonchi/rales  CARDIOVASCULAR: +S1S2, regular, no M/G/R; pedal pulses full and symmetric; no lower extremity edema  GASTROINTESTINAL: No palpable masses or tenderness, +BS throughout, no rebound/guarding  MUSCULOSKELETAL: no digital clubbing or cyanosis  SKIN: No rashes or ulcers noted  NEUROLOGIC: CN II-XII intact; sensation intact in LEs b/l to light touch  PSYCHIATRIC: A+O x 3; mood and affect appropriate      LABS:                        11.7   9.62  )-----------( 280      ( 15 Mar 2025 07:11 )             35.7     03-15    137  |  101  |  11  ----------------------------<  91  3.8   |  21[L]  |  0.80    Ca    9.0      15 Mar 2025 07:19            Urinalysis Basic - ( 15 Mar 2025 07:19 )    Color: x / Appearance: x / SG: x / pH: x  Gluc: 91 mg/dL / Ketone: x  / Bili: x / Urobili: x   Blood: x / Protein: x / Nitrite: x   Leuk Esterase: x / RBC: x / WBC x   Sq Epi: x / Non Sq Epi: x / Bacteria: x        Culture - Urine (collected 14 Mar 2025 15:25)  Source: Clean Catch Clean Catch (Midstream)  Preliminary Report (15 Mar 2025 22:53):    >100,000 CFU/ml Escherichia coli        RADIOLOGY & ADDITIONAL TESTS:  Results Reviewed:   Imaging Personally Reviewed:  Electrocardiogram Personally Reviewed:    COORDINATION OF CARE:  Care Discussed with Consultants/Other Providers [Y/N]:  Prior or Outpatient Records Reviewed [Y/N]:

## 2025-03-16 NOTE — PROGRESS NOTE ADULT - PROBLEM SELECTOR PLAN 3
- EKG NSR, HR 67 in ED, however pt w/episodes of A-fib w/RVR on tele to 130s/140s (3/15) requiring multiple pushes IV diltiazem  - May be contributing to sensation of lightheadedness/dizziness she has been feeling  - Does f/u with cardiologist (Earnest Corley MD) outpatient, w/hx of ablation 3/2022 for A-fib   - Appreciate cards recs, increased diltiazem to 240 mg daily, held off on metoprolol PO given HR fluctuating btw high 50s-60s  - C/w Eliquis 5 mg BID  - Pending TTE - EKG NSR, HR 67 in ED, however pt w/episodes of A-fib w/RVR on tele to 130s/140s (3/15) requiring multiple pushes IV diltiazem  - May be contributing to sensation of lightheadedness/dizziness she has been feeling  - Will also check orthostatics  - Does f/u with cardiologist (Earnest Corley MD) outpatient, w/hx of ablation 3/2022 for A-fib   - Appreciate cards recs, increased diltiazem to 240 mg daily, held off on metoprolol PO given HR fluctuating btw high 50s-60s  - C/w Eliquis 5 mg BID  - Pending TTE

## 2025-03-16 NOTE — PROGRESS NOTE ADULT - PROBLEM SELECTOR PLAN 2
Elevated BP   - Diltiazem increased to 240 mg daily in setting of A-fib w/RVR day prior  - Losartan (home dose 100 mg daily) discontinued   - Monitor BP

## 2025-03-17 ENCOUNTER — TRANSCRIPTION ENCOUNTER (OUTPATIENT)
Age: 75
End: 2025-03-17

## 2025-03-17 ENCOUNTER — RESULT REVIEW (OUTPATIENT)
Age: 75
End: 2025-03-17

## 2025-03-17 VITALS
RESPIRATION RATE: 18 BRPM | SYSTOLIC BLOOD PRESSURE: 165 MMHG | TEMPERATURE: 98 F | OXYGEN SATURATION: 95 % | DIASTOLIC BLOOD PRESSURE: 77 MMHG | HEART RATE: 59 BPM

## 2025-03-17 DIAGNOSIS — G92.8 OTHER TOXIC ENCEPHALOPATHY: ICD-10-CM

## 2025-03-17 LAB
-  AMOXICILLIN/CLAVULANIC ACID: SIGNIFICANT CHANGE UP
-  AMPICILLIN/SULBACTAM: SIGNIFICANT CHANGE UP
-  AMPICILLIN: SIGNIFICANT CHANGE UP
-  CEFAZOLIN: SIGNIFICANT CHANGE UP
-  CEFEPIME: SIGNIFICANT CHANGE UP
-  CEFOXITIN: SIGNIFICANT CHANGE UP
-  CEFTRIAXONE: SIGNIFICANT CHANGE UP
-  CEFUROXIME: SIGNIFICANT CHANGE UP
-  CIPROFLOXACIN: SIGNIFICANT CHANGE UP
-  ERTAPENEM: SIGNIFICANT CHANGE UP
-  GENTAMICIN: SIGNIFICANT CHANGE UP
-  IMIPENEM: SIGNIFICANT CHANGE UP
-  LEVOFLOXACIN: SIGNIFICANT CHANGE UP
-  MEROPENEM: SIGNIFICANT CHANGE UP
-  NITROFURANTOIN: SIGNIFICANT CHANGE UP
-  PIPERACILLIN/TAZOBACTAM: SIGNIFICANT CHANGE UP
-  TOBRAMYCIN: SIGNIFICANT CHANGE UP
-  TRIMETHOPRIM/SULFAMETHOXAZOLE: SIGNIFICANT CHANGE UP
CULTURE RESULTS: ABNORMAL
METHOD TYPE: SIGNIFICANT CHANGE UP
ORGANISM # SPEC MICROSCOPIC CNT: ABNORMAL
ORGANISM # SPEC MICROSCOPIC CNT: ABNORMAL
SPECIMEN SOURCE: SIGNIFICANT CHANGE UP
WNV IGG TITR FLD: SIGNIFICANT CHANGE UP
WNV IGM SPEC QL: NEGATIVE — SIGNIFICANT CHANGE UP

## 2025-03-17 PROCEDURE — 82746 ASSAY OF FOLIC ACID SERUM: CPT

## 2025-03-17 PROCEDURE — 82525 ASSAY OF COPPER: CPT

## 2025-03-17 PROCEDURE — 96374 THER/PROPH/DIAG INJ IV PUSH: CPT

## 2025-03-17 PROCEDURE — 93306 TTE W/DOPPLER COMPLETE: CPT

## 2025-03-17 PROCEDURE — 82947 ASSAY GLUCOSE BLOOD QUANT: CPT

## 2025-03-17 PROCEDURE — 84132 ASSAY OF SERUM POTASSIUM: CPT

## 2025-03-17 PROCEDURE — 87637 SARSCOV2&INF A&B&RSV AMP PRB: CPT

## 2025-03-17 PROCEDURE — 82330 ASSAY OF CALCIUM: CPT

## 2025-03-17 PROCEDURE — 84295 ASSAY OF SERUM SODIUM: CPT

## 2025-03-17 PROCEDURE — 70498 CT ANGIOGRAPHY NECK: CPT | Mod: MC

## 2025-03-17 PROCEDURE — 82607 VITAMIN B-12: CPT

## 2025-03-17 PROCEDURE — 84207 ASSAY OF VITAMIN B-6: CPT

## 2025-03-17 PROCEDURE — 86618 LYME DISEASE ANTIBODY: CPT

## 2025-03-17 PROCEDURE — 82140 ASSAY OF AMMONIA: CPT

## 2025-03-17 PROCEDURE — 93356 MYOCRD STRAIN IMG SPCKL TRCK: CPT

## 2025-03-17 PROCEDURE — 84436 ASSAY OF TOTAL THYROXINE: CPT

## 2025-03-17 PROCEDURE — 83605 ASSAY OF LACTIC ACID: CPT

## 2025-03-17 PROCEDURE — 86780 TREPONEMA PALLIDUM: CPT

## 2025-03-17 PROCEDURE — 86789 WEST NILE VIRUS ANTIBODY: CPT

## 2025-03-17 PROCEDURE — 81001 URINALYSIS AUTO W/SCOPE: CPT

## 2025-03-17 PROCEDURE — 87086 URINE CULTURE/COLONY COUNT: CPT

## 2025-03-17 PROCEDURE — 84443 ASSAY THYROID STIM HORMONE: CPT

## 2025-03-17 PROCEDURE — 85018 HEMOGLOBIN: CPT

## 2025-03-17 PROCEDURE — 86788 WEST NILE VIRUS AB IGM: CPT

## 2025-03-17 PROCEDURE — 93306 TTE W/DOPPLER COMPLETE: CPT | Mod: 26

## 2025-03-17 PROCEDURE — 80048 BASIC METABOLIC PNL TOTAL CA: CPT

## 2025-03-17 PROCEDURE — 84484 ASSAY OF TROPONIN QUANT: CPT

## 2025-03-17 PROCEDURE — 93005 ELECTROCARDIOGRAM TRACING: CPT

## 2025-03-17 PROCEDURE — 82306 VITAMIN D 25 HYDROXY: CPT

## 2025-03-17 PROCEDURE — 84480 ASSAY TRIIODOTHYRONINE (T3): CPT

## 2025-03-17 PROCEDURE — 87186 SC STD MICRODIL/AGAR DIL: CPT

## 2025-03-17 PROCEDURE — 85014 HEMATOCRIT: CPT

## 2025-03-17 PROCEDURE — 70496 CT ANGIOGRAPHY HEAD: CPT | Mod: MC

## 2025-03-17 PROCEDURE — 70450 CT HEAD/BRAIN W/O DYE: CPT | Mod: MC

## 2025-03-17 PROCEDURE — 99285 EMERGENCY DEPT VISIT HI MDM: CPT

## 2025-03-17 PROCEDURE — 84630 ASSAY OF ZINC: CPT

## 2025-03-17 PROCEDURE — 85025 COMPLETE CBC W/AUTO DIFF WBC: CPT

## 2025-03-17 PROCEDURE — 82803 BLOOD GASES ANY COMBINATION: CPT

## 2025-03-17 PROCEDURE — 80053 COMPREHEN METABOLIC PANEL: CPT

## 2025-03-17 PROCEDURE — 82435 ASSAY OF BLOOD CHLORIDE: CPT

## 2025-03-17 PROCEDURE — 85610 PROTHROMBIN TIME: CPT

## 2025-03-17 PROCEDURE — 84425 ASSAY OF VITAMIN B-1: CPT

## 2025-03-17 PROCEDURE — 99239 HOSP IP/OBS DSCHRG MGMT >30: CPT

## 2025-03-17 PROCEDURE — 85730 THROMBOPLASTIN TIME PARTIAL: CPT

## 2025-03-17 RX ORDER — OXYBUTYNIN CHLORIDE 5 MG/1
1 TABLET, FILM COATED, EXTENDED RELEASE ORAL
Refills: 0 | DISCHARGE

## 2025-03-17 RX ORDER — DILTIAZEM HYDROCHLORIDE 240 MG/1
1 TABLET, EXTENDED RELEASE ORAL
Qty: 30 | Refills: 0
Start: 2025-03-17 | End: 2025-04-15

## 2025-03-17 RX ORDER — LOSARTAN POTASSIUM 100 MG/1
1 TABLET, FILM COATED ORAL
Refills: 0 | DISCHARGE

## 2025-03-17 RX ORDER — DILTIAZEM HYDROCHLORIDE 240 MG/1
1 TABLET, EXTENDED RELEASE ORAL
Refills: 0 | DISCHARGE

## 2025-03-17 RX ORDER — SULFAMETHOXAZOLE/TRIMETHOPRIM 800-160 MG
1 TABLET ORAL
Qty: 4 | Refills: 0
Start: 2025-03-17 | End: 2025-03-18

## 2025-03-17 RX ADMIN — CEFTRIAXONE 100 MILLIGRAM(S): 500 INJECTION, POWDER, FOR SOLUTION INTRAMUSCULAR; INTRAVENOUS at 12:42

## 2025-03-17 RX ADMIN — APIXABAN 5 MILLIGRAM(S): 2.5 TABLET, FILM COATED ORAL at 05:23

## 2025-03-17 RX ADMIN — DILTIAZEM HYDROCHLORIDE 240 MILLIGRAM(S): 240 TABLET, EXTENDED RELEASE ORAL at 05:22

## 2025-03-17 RX ADMIN — Medication 40 MILLIGRAM(S): at 05:22

## 2025-03-17 RX ADMIN — BUPROPION HYDROBROMIDE 300 MILLIGRAM(S): 522 TABLET, EXTENDED RELEASE ORAL at 12:42

## 2025-03-17 NOTE — PROGRESS NOTE ADULT - SUBJECTIVE AND OBJECTIVE BOX
DATE OF SERVICE: 03-17-25 @ 11:13    Patient is a 75y old  Female who presents with a chief complaint of worsening memory/speech, UTI, hypertension (16 Mar 2025 14:01)      INTERVAL HISTORY: feeling better,  at bedside    REVIEW OF SYSTEMS:  CONSTITUTIONAL: No weakness  EYES/ENT: No visual changes;  No throat pain   NECK: No pain or stiffness  RESPIRATORY: No cough, wheezing; No shortness of breath  CARDIOVASCULAR: No chest pain or palpitations  GASTROINTESTINAL: No abdominal  pain. No nausea, vomiting, or hematemesis  GENITOURINARY: No dysuria, frequency or hematuria  NEUROLOGICAL: No stroke like symptoms  SKIN: No rashes    TELEMETRY Personally reviewed: SR/SB 58-60s  	  MEDICATIONS:  diltiazem    milliGRAM(s) Oral daily        PHYSICAL EXAM:  T(C): 36.3 (03-17-25 @ 05:55), Max: 36.6 (03-16-25 @ 11:16)  HR: 58 (03-17-25 @ 05:55) (58 - 62)  BP: 134/73 (03-17-25 @ 05:55) (127/60 - 139/69)  RR: 18 (03-17-25 @ 05:55) (18 - 18)  SpO2: 98% (03-17-25 @ 05:55) (95% - 98%)  Wt(kg): --  I&O's Summary    16 Mar 2025 07:01  -  17 Mar 2025 07:00  --------------------------------------------------------  IN: 740 mL / OUT: 0 mL / NET: 740 mL          Appearance: In no distress	  HEENT:    PERRL, EOMI	  Cardiovascular:  S1 S2, No JVD  Respiratory: Lungs clear to auscultation	  Gastrointestinal:  Soft, Non-tender, + BS	  Vascularature:  No edema of LE  Psychiatric: Appropriate affect   Neuro: no acute focal deficits                     Labs personally reviewed      ASSESSMENT/PLAN: 	    75F w/ PMH of AF s/p ablation, on Eliquis, HTN, multiple CVAs, here for UTI and cardiology consulted for HTN and AF management     -AF w/ RVR triggered by underlying UTI   -increase Cardizem CD to 240 mg QD   -add Metoprolol 25 mg TID with hold parameters (hold if SBP<90 or HR<60)   -IV metoprolol 5 mg PRN for HR>130 persistently   -if further rate control needed, consider IV digoxin load   -cont losartan 100 mg qd  -cont eliquis for AC   -check TTE ECHO   -monitor on tele   -monitor lytes Iolani Behrbom, AG-NP Omid Javdan, DO [facility]  Cardiovascular Medicine  [address] [place] [address], [address]  Available through call or text on [employer] TEAMs  Office: [phone]   DATE OF SERVICE: 03-17-25 @ 11:13    Patient is a 75y old  Female who presents with a chief complaint of worsening memory/speech, UTI, hypertension (16 Mar 2025 14:01)      INTERVAL HISTORY: feeling better,  at bedside    REVIEW OF SYSTEMS:  CONSTITUTIONAL: No weakness  EYES/ENT: No visual changes;  No throat pain   NECK: No pain or stiffness  RESPIRATORY: No cough, wheezing; No shortness of breath  CARDIOVASCULAR: No chest pain or palpitations  GASTROINTESTINAL: No abdominal  pain. No nausea, vomiting, or hematemesis  GENITOURINARY: No dysuria, frequency or hematuria  NEUROLOGICAL: No stroke like symptoms  SKIN: No rashes    TELEMETRY Personally reviewed: SR/SB 58-60s  	  MEDICATIONS:  diltiazem    milliGRAM(s) Oral daily        PHYSICAL EXAM:  T(C): 36.3 (03-17-25 @ 05:55), Max: 36.6 (03-16-25 @ 11:16)  HR: 58 (03-17-25 @ 05:55) (58 - 62)  BP: 134/73 (03-17-25 @ 05:55) (127/60 - 139/69)  RR: 18 (03-17-25 @ 05:55) (18 - 18)  SpO2: 98% (03-17-25 @ 05:55) (95% - 98%)  Wt(kg): --  I&O's Summary    16 Mar 2025 07:01  -  17 Mar 2025 07:00  --------------------------------------------------------  IN: 740 mL / OUT: 0 mL / NET: 740 mL          Appearance: In no distress	  HEENT:    PERRL, EOMI	  Cardiovascular:  S1 S2, No JVD  Respiratory: Lungs clear to auscultation	  Gastrointestinal:  Soft, Non-tender, + BS	  Vascularature:  No edema of LE  Psychiatric: Appropriate affect   Neuro: no acute focal deficits                     Labs personally reviewed      ASSESSMENT/PLAN: 	    75F w/ PMH of AF s/p ablation, on Eliquis, HTN, multiple CVAs, here for UTI and cardiology consulted for HTN and AF management     -AF w/ RVR triggered by underlying UTI   -increased Cardizem CD to 240 mg QD   -cont losartan 100 mg qd  -cont eliquis for AC   - TTE ECHO as OP, has appt scheduled with OP cards  -monitor on tele   -monitor lytes Iolani Behrbom, AG-NP Omid Javdan, DO [facility]  Cardiovascular Medicine  [address] [facility], [address]  Available through call or text on [employer] TEAMs  Office: [phone]

## 2025-03-17 NOTE — DISCHARGE NOTE NURSING/CASE MANAGEMENT/SOCIAL WORK - NSDCPEFALRISK_GEN_ALL_CORE
For information on Fall & Injury Prevention, visit: [email or web address] OR  [email or web address] OR  https://www.cdc.gov/steadi/patient.html

## 2025-03-17 NOTE — DISCHARGE NOTE NURSING/CASE MANAGEMENT/SOCIAL WORK - PATIENT PORTAL LINK FT
You can access the FollowMyHealth Patient Portal offered by [facility] by registering at the following website: [email or web address]. By joining [facility]’s FollowMyHealth portal, you will also be able to view your health information using other applications (apps) compatible with our system.

## 2025-03-17 NOTE — DISCHARGE NOTE NURSING/CASE MANAGEMENT/SOCIAL WORK - FINANCIAL ASSISTANCE
[facility] provides services at a reduced cost to those who are determined to be eligible through [facility]’s financial assistance program. Information regarding [facility]’s financial assistance program can be found by going to [email or web address] or by calling [phone].

## 2025-03-17 NOTE — DISCHARGE NOTE NURSING/CASE MANAGEMENT/SOCIAL WORK - NSDCFUADDAPPT_GEN_ALL_CORE_FT
APPTS ARE READY TO BE MADE: [X] YES    Best Family or Patient Contact (if needed):    Additional Information about above appointments (if needed):    1: PCP  2: Cardiology  3: Neurology     Other comments or requests:

## 2025-03-18 LAB
COPPER SERPL-MCNC: 137 UG/DL — SIGNIFICANT CHANGE UP (ref 80–158)
ZINC SERPL-MCNC: 69 UG/DL — SIGNIFICANT CHANGE UP (ref 44–115)

## 2025-03-19 LAB — PYRIDOXAL PHOS SERPL-MCNC: 15 UG/L — SIGNIFICANT CHANGE UP (ref 3.4–65.2)

## 2025-03-20 LAB — VIT B1 SERPL-MCNC: 160.3 NMOL/L — SIGNIFICANT CHANGE UP (ref 66.5–200)

## 2025-05-27 NOTE — DISCHARGE NOTE NURSING/CASE MANAGEMENT/SOCIAL WORK - CASE MANAGER'S NAME
1040: I have reviewed discharge instructions with the patient. The patient verbalized understanding. Discharge medications reviewed with patient and appropriate educational materials and side effects teaching were provided. Follow-up appointments reviewed. Opportunity for questions and clarification was provided. Venous access removed without difficulty. Patient is ready for discharge. Waiting on spouse for .    1130: Patient was escorted to the discharge area by the volunteer.       Myranda Wynne RN CM

## (undated) DEVICE — BASIN EMESIS 10IN GRADUATED MAUVE

## (undated) DEVICE — LUBRICATING JELLY HR ONE SHOT 3G

## (undated) DEVICE — TUBING IV SET GRAVITY 3Y 100" MACRO

## (undated) DEVICE — FACESHIELD FULL VISOR

## (undated) DEVICE — DENTURE CUP PINK

## (undated) DEVICE — BITE BLOCK ADULT 20 X 27MM (GREEN)

## (undated) DEVICE — BIOPSY FORCEP COLD DISP

## (undated) DEVICE — GOWN LG

## (undated) DEVICE — PACK IV START WITH CHG

## (undated) DEVICE — DRSG 2X2

## (undated) DEVICE — CATH IV SAFE BC 22G X 1" (BLUE)

## (undated) DEVICE — BIOPSY FORCEP RADIAL JAW 4 STANDARD WITH NEEDLE

## (undated) DEVICE — DRSG CURITY GAUZE SPONGE 4 X 4" 12-PLY NON-STERILE

## (undated) DEVICE — TUBING MEDI-VAC W MAXIGRIP CONNECTORS 1/4"X6'

## (undated) DEVICE — CLAMP BX HOT RAD JAW 3

## (undated) DEVICE — DRSG BANDAID 0.75X3"

## (undated) DEVICE — LINE BREATHE SAMPLNG

## (undated) DEVICE — ELCTR ECG CONDUCTIVE ADHESIVE

## (undated) DEVICE — UNDERPAD LINEN SAVER 17 X 24"

## (undated) DEVICE — CONTAINER FORMALIN 80ML YELLOW

## (undated) DEVICE — SALIVA EJECTOR (BLUE)